# Patient Record
Sex: MALE | Race: BLACK OR AFRICAN AMERICAN | Employment: FULL TIME | URBAN - METROPOLITAN AREA
[De-identification: names, ages, dates, MRNs, and addresses within clinical notes are randomized per-mention and may not be internally consistent; named-entity substitution may affect disease eponyms.]

---

## 2017-01-24 RX ORDER — TERBINAFINE HYDROCHLORIDE 250 MG/1
TABLET ORAL
Qty: 90 TABLET | Refills: 0 | OUTPATIENT
Start: 2017-01-24

## 2017-01-24 NOTE — TELEPHONE ENCOUNTER
Received refill request for Terbinafine from [facility].  Order of 12-28-17 was #90. Called [facility], he was apparently dispensed #30 and refills are available. Attempted to call patient, his number was no longer in service.   Letter written telling patient that refi

## 2017-01-31 ENCOUNTER — TELEPHONE (OUTPATIENT)
Dept: INTERNAL MEDICINE CLINIC | Facility: CLINIC | Age: 52
End: 2017-01-31

## 2017-01-31 NOTE — TELEPHONE ENCOUNTER
Pt is seeing  on 2/3 for a follow up - cough and chest/rib pain and bruising.  He is asking if he needs to do a blood test.             Tasked to Nursing

## 2017-02-03 ENCOUNTER — HOSPITAL ENCOUNTER (OUTPATIENT)
Dept: GENERAL RADIOLOGY | Facility: HOSPITAL | Age: 52
Discharge: HOME | End: 2017-02-03
Attending: INTERNAL MEDICINE
Payer: COMMERCIAL

## 2017-02-03 ENCOUNTER — HOSPITAL ENCOUNTER (OUTPATIENT)
Dept: GENERAL RADIOLOGY | Age: 52
Discharge: HOME | End: 2017-02-03
Attending: INTERNAL MEDICINE
Payer: COMMERCIAL

## 2017-02-03 ENCOUNTER — TELEPHONE (OUTPATIENT)
Dept: INTERNAL MEDICINE CLINIC | Facility: CLINIC | Age: 52
End: 2017-02-03

## 2017-02-03 ENCOUNTER — OFFICE VISIT (OUTPATIENT)
Dept: INTERNAL MEDICINE CLINIC | Facility: CLINIC | Age: 52
End: 2017-02-03

## 2017-02-03 VITALS
WEIGHT: 241.19 LBS | BODY MASS INDEX: 37.85 KG/M2 | HEIGHT: 67 IN | OXYGEN SATURATION: 98 % | SYSTOLIC BLOOD PRESSURE: 118 MMHG | TEMPERATURE: 98 F | HEART RATE: 90 BPM | DIASTOLIC BLOOD PRESSURE: 80 MMHG

## 2017-02-03 DIAGNOSIS — E78.00 HYPERCHOLESTEREMIA: ICD-10-CM

## 2017-02-03 DIAGNOSIS — I10 ESSENTIAL HYPERTENSION WITH GOAL BLOOD PRESSURE LESS THAN 140/90: ICD-10-CM

## 2017-02-03 DIAGNOSIS — E11.9 TYPE 2 DIABETES MELLITUS WITHOUT COMPLICATION, UNSPECIFIED LONG TERM INSULIN USE STATUS: ICD-10-CM

## 2017-02-03 DIAGNOSIS — R07.81 RIB PAIN ON RIGHT SIDE: Primary | ICD-10-CM

## 2017-02-03 DIAGNOSIS — R07.81 RIB PAIN ON RIGHT SIDE: ICD-10-CM

## 2017-02-03 DIAGNOSIS — R10.9 FLANK PAIN: ICD-10-CM

## 2017-02-03 LAB
MULTISTIX LOT#: NORMAL NUMERIC
PH, URINE: 6 (ref 4.5–8)
SPECIFIC GRAVITY: 1.02 (ref 1–1.03)
URINE-COLOR: YELLOW
UROBILINOGEN,SEMI-QN: 0.2 MG/DL (ref 0–1.9)

## 2017-02-03 PROCEDURE — 99214 OFFICE O/P EST MOD 30 MIN: CPT | Performed by: INTERNAL MEDICINE

## 2017-02-03 PROCEDURE — 71100 X-RAY EXAM RIBS UNI 2 VIEWS: CPT

## 2017-02-03 PROCEDURE — 81002 URINALYSIS NONAUTO W/O SCOPE: CPT | Performed by: INTERNAL MEDICINE

## 2017-02-03 PROCEDURE — 99213 OFFICE O/P EST LOW 20 MIN: CPT | Performed by: INTERNAL MEDICINE

## 2017-02-03 RX ORDER — CYCLOBENZAPRINE HCL 10 MG
10 TABLET ORAL 3 TIMES DAILY PRN
Qty: 42 TABLET | Refills: 1 | Status: SHIPPED | OUTPATIENT
Start: 2017-02-03 | End: 2018-08-14

## 2017-02-03 RX ORDER — HYDROCODONE BITARTRATE AND ACETAMINOPHEN 5; 325 MG/1; MG/1
1 TABLET ORAL EVERY 6 HOURS PRN
Qty: 60 TABLET | Refills: 0 | Status: SHIPPED | OUTPATIENT
Start: 2017-02-03 | End: 2018-06-06

## 2017-02-03 RX ORDER — MELOXICAM 15 MG/1
15 TABLET ORAL DAILY
Qty: 30 TABLET | Refills: 1 | Status: SHIPPED | OUTPATIENT
Start: 2017-02-03 | End: 2018-02-01

## 2017-02-03 NOTE — PROGRESS NOTES
India Donnelly is a 54year old male. HPI:   Patient presents with:   Follow - Up: toenail fungus, DM II, Right side/ back pain x 3 weeks       53 y/o M who had recent bronchitis characterized shane moderate-to-severe cough; pt then developed +right flank enrique Tab [address] returned a non-compliant response. The tax information is out of balance.  Disp:  Rfl: 0       Allergies:  No Known Allergies              ROS:   Constitutional: no weight loss; no fatigue  Cardiovascular:  Negative orders of the defined types were placed in this encounter.        Meds This Visit:    No prescriptions requested or ordered in this encounter    Imaging & Referrals:  None     2/3/2017  Rashida Tirado MD

## 2017-05-16 ENCOUNTER — TELEPHONE (OUTPATIENT)
Dept: INTERNAL MEDICINE CLINIC | Facility: CLINIC | Age: 52
End: 2017-05-16

## 2017-05-16 RX ORDER — PROMETHAZINE HYDROCHLORIDE, PHENYLEPHRINE HYDROCHLORIDE AND CODEINE PHOSPHATE 6.25; 5; 1 MG/5ML; MG/5ML; MG/5ML
5 SOLUTION ORAL EVERY 6 HOURS PRN
Qty: 180 ML | Refills: 0 | Status: SHIPPED
Start: 2017-05-16 | End: 2017-05-30

## 2017-05-16 NOTE — TELEPHONE ENCOUNTER
To MD:  The above refill request is for a controlled substance. Please indicate yes or no to refill 30 days supply plus one refill.   If more refills are appropriate, please indicate quantity  Pending  RX for cough syrup -  To   Called patient and rela

## 2017-05-16 NOTE — TELEPHONE ENCOUNTER
Patient called back. Notified him script for cough syrup was faxed. Patient verbalized understanding.

## 2017-05-16 NOTE — TELEPHONE ENCOUNTER
Please advise for DR. CORONA patient - called patient who states he has a cold again with cough , rib pain from coughing , wants Norco and cough syrup, temp 99.0 , does not want to go to urgent care \" they are useless\" - to DR. FARIAS

## 2017-05-16 NOTE — TELEPHONE ENCOUNTER
[facility]  Called patient to notify him script was sent but no answer.  LMTCB  Copy of script sent to scanning

## 2017-05-16 NOTE — TELEPHONE ENCOUNTER
Please notify patient I am happy to help. I can give promethazine with codeine which can help his cough and rib pain.   I cannot give both promethazine with codeine and Norco.  If he would like I can give him Norco and then he can use over-the-counter DayQ

## 2017-05-16 NOTE — TELEPHONE ENCOUNTER
Prescription on cart to be faxed. I needed to alter amount prescribed since I am covering for Dr. Jennifer Flannery.

## 2017-05-16 NOTE — TELEPHONE ENCOUNTER
Pt. Is calling to get another Rx for cough syrup & norco for rib pain his cough & cold   Ph.  # [phone]     [phone]    Routed to clinical

## 2017-07-28 ENCOUNTER — LAB ENCOUNTER (OUTPATIENT)
Dept: LAB | Age: 52
End: 2017-07-28
Attending: INTERNAL MEDICINE
Payer: COMMERCIAL

## 2017-07-28 DIAGNOSIS — E11.9 DIABETES MELLITUS (HCC): Primary | ICD-10-CM

## 2017-07-28 LAB
ALBUMIN SERPL BCP-MCNC: 3.6 G/DL (ref 3.5–4.8)
ALBUMIN/GLOB SERPL: 1 {RATIO} (ref 1–2)
ALP SERPL-CCNC: 96 U/L (ref 32–100)
ALT SERPL-CCNC: 33 U/L (ref 17–63)
ANION GAP SERPL CALC-SCNC: 8 MMOL/L (ref 0–18)
AST SERPL-CCNC: 24 U/L (ref 15–41)
BILIRUB SERPL-MCNC: 0.8 MG/DL (ref 0.3–1.2)
BUN SERPL-MCNC: 13 MG/DL (ref 8–20)
BUN/CREAT SERPL: 14.8 (ref 10–20)
CALCIUM SERPL-MCNC: 9.1 MG/DL (ref 8.5–10.5)
CHLORIDE SERPL-SCNC: 103 MMOL/L (ref 95–110)
CHOLEST SERPL-MCNC: 227 MG/DL (ref 110–200)
CO2 SERPL-SCNC: 25 MMOL/L (ref 22–32)
CREAT SERPL-MCNC: 0.88 MG/DL (ref 0.5–1.5)
GLOBULIN PLAS-MCNC: 3.5 G/DL (ref 2.5–3.7)
GLUCOSE SERPL-MCNC: 209 MG/DL (ref 70–99)
HBA1C MFR BLD: 11.9 % (ref 4–6)
HDLC SERPL-MCNC: 34 MG/DL
LDLC SERPL CALC-MCNC: 166 MG/DL (ref 0–99)
NONHDLC SERPL-MCNC: 193 MG/DL
OSMOLALITY UR CALC.SUM OF ELEC: 288 MOSM/KG (ref 275–295)
POTASSIUM SERPL-SCNC: 4.4 MMOL/L (ref 3.3–5.1)
PROT SERPL-MCNC: 7.1 G/DL (ref 5.9–8.4)
SODIUM SERPL-SCNC: 136 MMOL/L (ref 136–144)
TRIGL SERPL-MCNC: 134 MG/DL (ref 1–149)

## 2017-07-28 PROCEDURE — 80053 COMPREHEN METABOLIC PANEL: CPT

## 2017-07-28 PROCEDURE — 80061 LIPID PANEL: CPT

## 2017-07-28 PROCEDURE — 36415 COLL VENOUS BLD VENIPUNCTURE: CPT

## 2017-07-28 PROCEDURE — 83036 HEMOGLOBIN GLYCOSYLATED A1C: CPT

## 2017-08-21 ENCOUNTER — HOSPITAL ENCOUNTER (OUTPATIENT)
Dept: GENERAL RADIOLOGY | Age: 52
Discharge: HOME | End: 2017-08-21
Attending: INTERNAL MEDICINE
Payer: COMMERCIAL

## 2017-08-21 ENCOUNTER — OFFICE VISIT (OUTPATIENT)
Dept: INTERNAL MEDICINE CLINIC | Facility: CLINIC | Age: 52
End: 2017-08-21

## 2017-08-21 VITALS
OXYGEN SATURATION: 98 % | TEMPERATURE: 98 F | WEIGHT: 248 LBS | HEART RATE: 118 BPM | BODY MASS INDEX: 38.92 KG/M2 | HEIGHT: 67 IN | DIASTOLIC BLOOD PRESSURE: 68 MMHG | SYSTOLIC BLOOD PRESSURE: 110 MMHG

## 2017-08-21 DIAGNOSIS — J40 BRONCHITIS: ICD-10-CM

## 2017-08-21 DIAGNOSIS — R50.9 FEVER, UNSPECIFIED FEVER CAUSE: Primary | ICD-10-CM

## 2017-08-21 DIAGNOSIS — E78.00 HYPERCHOLESTEREMIA: ICD-10-CM

## 2017-08-21 DIAGNOSIS — E11.9 TYPE 2 DIABETES MELLITUS WITHOUT COMPLICATION, UNSPECIFIED LONG TERM INSULIN USE STATUS: ICD-10-CM

## 2017-08-21 DIAGNOSIS — R50.9 FEVER, UNSPECIFIED FEVER CAUSE: ICD-10-CM

## 2017-08-21 PROCEDURE — 71020 XR CHEST PA + LAT CHEST (CPT=71020): CPT | Performed by: INTERNAL MEDICINE

## 2017-08-21 PROCEDURE — 99214 OFFICE O/P EST MOD 30 MIN: CPT | Performed by: INTERNAL MEDICINE

## 2017-08-21 PROCEDURE — 99212 OFFICE O/P EST SF 10 MIN: CPT | Performed by: INTERNAL MEDICINE

## 2017-08-21 RX ORDER — LEVOFLOXACIN 500 MG/1
500 TABLET, FILM COATED ORAL DAILY
Qty: 10 TABLET | Refills: 0 | Status: SHIPPED | OUTPATIENT
Start: 2017-08-21 | End: 2017-11-09

## 2017-08-21 RX ORDER — PROMETHAZINE HYDROCHLORIDE AND CODEINE PHOSPHATE 6.25; 1 MG/5ML; MG/5ML
5 SYRUP ORAL EVERY 6 HOURS PRN
Qty: 180 ML | Refills: 1 | Status: SHIPPED
Start: 2017-08-21 | End: 2017-11-16

## 2017-08-21 NOTE — PROGRESS NOTES
Ludy Carrera is a 54year old male.     HPI:   Patient presents with:  Cough: cough,rib and chest pain from cough per pt. and ringing in ears      55 y/o M with 3 d h/o +cough; +rib pain from coughing; +mild SOB; no CP; + sputum; fever to 104 seven days ago Rfl:    HYDROcodone-acetaminophen 5-325 MG Oral Tab WARNING - [employer] returned a non-compliant response. The tax information is out of balance. Disp:  Rfl: 0   Meloxicam 15 MG Oral Tab Take 1 tablet (15 mg total) by mouth daily.  Disp: 3 Yves     Onychomycosis  On terbinafine 250 mg po qD under care of podiatrist, Dr Kiersten Sun     PSA screening  Check PSA with next labs         cell [phone]              Orders This Visit:  No orders of the defined types were placed in this encounter.

## 2017-08-22 ENCOUNTER — TELEPHONE (OUTPATIENT)
Dept: INTERNAL MEDICINE CLINIC | Facility: CLINIC | Age: 52
End: 2017-08-22

## 2017-08-22 NOTE — TELEPHONE ENCOUNTER
[phone]  Pt was in to see Dr Lorie Armstrong yesterday and took off work yesterday and today. Pt would like a note excusing him from work 8/21-8/23 so the antibiotic can start to work.  pls call pt when ready to   To clinical

## 2017-08-23 NOTE — TELEPHONE ENCOUNTER
PT CALLING BACK AND THOUGHT DR. Octavio Knott WAS HERE ALREADY AND I TOLD PT HE IS NOT HERE UNTIL NOON    PT NEEDS A LETTER AND IS ASKING FOR A RETURN TO WORK NOTE TO GO BACK TOMORROW     PT IS COUGHING BUT DID NOT WISH TO GO TO WORK TODAY AND WAIT ONE MORE DAY

## 2017-11-09 ENCOUNTER — TELEPHONE (OUTPATIENT)
Dept: INTERNAL MEDICINE CLINIC | Facility: CLINIC | Age: 52
End: 2017-11-09

## 2017-11-09 RX ORDER — AZITHROMYCIN 250 MG/1
TABLET, FILM COATED ORAL
Qty: 6 TABLET | Refills: 0 | Status: SHIPPED | OUTPATIENT
Start: 2017-11-09 | End: 2017-11-16

## 2017-11-09 NOTE — TELEPHONE ENCOUNTER
To Dr. Jennifer Flannery - see below, sx onset: yesterday AM - denies fever/chills but does have a bit of achyness along ribs, lower back, neck.   [facility] [address] [facility]

## 2017-11-09 NOTE — TELEPHONE ENCOUNTER
Imp- URI; Rec- Zithromax (Z-nesha) as directed x5d; ERx sent to pharmacy as requested; please call pt

## 2017-11-09 NOTE — TELEPHONE ENCOUNTER
Please call pt, wife and son have been sick, both on ZPak, pt trying to avoid  Can pt receive prescription also starting with same symptoms, scratchy throat and cough, achy  Pt had flu shot  Please call to advise  Tasked to nursing

## 2017-11-16 ENCOUNTER — OFFICE VISIT (OUTPATIENT)
Dept: INTERNAL MEDICINE CLINIC | Facility: CLINIC | Age: 52
End: 2017-11-16

## 2017-11-16 ENCOUNTER — TELEPHONE (OUTPATIENT)
Dept: INTERNAL MEDICINE CLINIC | Facility: CLINIC | Age: 52
End: 2017-11-16

## 2017-11-16 VITALS
DIASTOLIC BLOOD PRESSURE: 86 MMHG | TEMPERATURE: 99 F | HEIGHT: 66 IN | BODY MASS INDEX: 38.86 KG/M2 | WEIGHT: 241.81 LBS | OXYGEN SATURATION: 98 % | SYSTOLIC BLOOD PRESSURE: 130 MMHG | HEART RATE: 81 BPM

## 2017-11-16 DIAGNOSIS — E11.9 TYPE 2 DIABETES MELLITUS WITHOUT COMPLICATION, UNSPECIFIED LONG TERM INSULIN USE STATUS: ICD-10-CM

## 2017-11-16 DIAGNOSIS — R07.81 RIB PAIN ON RIGHT SIDE: Primary | ICD-10-CM
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
## 2017-11-16 PROCEDURE — 99214 OFFICE O/P EST MOD 30 MIN: CPT | Performed by: INTERNAL MEDICINE

## 2017-11-16 PROCEDURE — 99212 OFFICE O/P EST SF 10 MIN: CPT | Performed by: INTERNAL MEDICINE

## 2017-11-16 RX ORDER — TRAMADOL HYDROCHLORIDE 50 MG/1
TABLET ORAL
Refills: 2 | COMMUNITY
Start: 2017-11-03 | End: 2018-06-06

## 2017-11-16 RX ORDER — LANCETS
1 EACH MISCELLANEOUS
COMMUNITY
Start: 2011-11-22

## 2017-11-16 NOTE — TELEPHONE ENCOUNTER
Pt. Is calling to get a new note from Dr. Bj Thakkar that states he has no restrictions  Ph. # [phone]    Fax.  # [phone] Attn: Soledad Cuevas in [facility] dining    Routed to [facility]

## 2017-11-16 NOTE — PROGRESS NOTES
Silviano Gustafson is a 54year old male. HPI:   Patient presents with: Follow - Up: Patient presents for back pain due to a fall from a ladder. Denies numbness or tingaling. Last office visit 8/21/2017 w/ Dr. Alexsandra Braga. As per nursing documentation.   Pa tablet by mouth 2 (two) times daily. Disp:  Rfl: 1   Liraglutide (VICTOZA) 18 MG/3ML Subcutaneous Solution Pen-injector Inject 0.2 mL into the skin daily.  Disp:  Rfl:    TraMADol HCl 50 MG Oral Tab TAKE 1 TO 2 TABLETS BY MOUTH EVERY 6 HOURS AS NEEEDED FOR ecchymosis. there does not seem to be one specific area of pain only in this area right lateral thoracic area. ASSESSMENT AND PLAN:     1. Rib pain on right side  Right rib pain after fall. Patient does not look to be in any acute distress.   We will

## 2018-02-01 ENCOUNTER — TELEPHONE (OUTPATIENT)
Dept: INTERNAL MEDICINE CLINIC | Facility: CLINIC | Age: 53
End: 2018-02-01

## 2018-02-01 RX ORDER — MELOXICAM 15 MG/1
TABLET ORAL
Qty: 30 TABLET | Refills: 5 | Status: SHIPPED | OUTPATIENT
Start: 2018-02-01 | End: 2018-07-20

## 2018-02-01 NOTE — TELEPHONE ENCOUNTER
Meloxicam last filled 2/3/17 #30/1.  Per Dr. Cat Jeferson office visit on 2/3/17:  ASSESSMENT/PLAN:   Right flank pain  Exam suggest muscle and rib injury; check UA dipstick neg hematuria; check XR ribs right; trial meloxicam 15 mg po qD, Flexeril 10 mg po q8hrs prn,

## 2018-02-19 ENCOUNTER — TELEPHONE (OUTPATIENT)
Dept: INTERNAL MEDICINE CLINIC | Facility: CLINIC | Age: 53
End: 2018-02-19

## 2018-02-19 RX ORDER — LEVOFLOXACIN 500 MG/1
500 TABLET, FILM COATED ORAL DAILY
Qty: 10 TABLET | Refills: 0 | Status: SHIPPED | OUTPATIENT
Start: 2018-02-19 | End: 2018-03-01

## 2018-02-19 NOTE — TELEPHONE ENCOUNTER
Pt has a bad cough/causing him rib & back pain  Requests refill of cough medicine Dr Femi Quiroga had prescribed  And something to help with pain    [facility] [place]     Please call pt to advise/confirm [phone]

## 2018-02-19 NOTE — TELEPHONE ENCOUNTER
Called patient to get more information. He reports this is his 5th day of coughing. He has soreness to his chest when he coughs or swallows. He is coughing up greenish yellow phlegm. He did have a temp of 101 2 days ago but has not checked his temp since.

## 2018-02-20 NOTE — TELEPHONE ENCOUNTER
To Dr Roxie Augustin to review high interaction    Drug-Drug: GlyBURIDE-MetFORMIN and levofloxacin   The hypoglycemic effect of Sulfonylureas may be increased by Quinolones especially in elderly patients with renal compromise.  Hypoglycemia symptoms including lightheade

## 2018-02-20 NOTE — TELEPHONE ENCOUNTER
Tan MCNEILL called and spoke to patient and notified him that rx was sent.   I called patient back again and relayed Dr Shikha Jeronimo message and instructions to him below about possible interaction, to check sugar BID and hold glyburide metformin if sugar less

## 2018-04-05 ENCOUNTER — HOSPITAL ENCOUNTER (OUTPATIENT)
Dept: GENERAL RADIOLOGY | Age: 53
Discharge: HOME | End: 2018-04-05
Attending: INTERNAL MEDICINE
Payer: COMMERCIAL

## 2018-04-05 ENCOUNTER — OFFICE VISIT (OUTPATIENT)
Dept: INTERNAL MEDICINE CLINIC | Facility: CLINIC | Age: 53
End: 2018-04-05

## 2018-04-05 VITALS
WEIGHT: 252 LBS | OXYGEN SATURATION: 95 % | SYSTOLIC BLOOD PRESSURE: 106 MMHG | HEIGHT: 67 IN | TEMPERATURE: 99 F | BODY MASS INDEX: 39.55 KG/M2 | DIASTOLIC BLOOD PRESSURE: 78 MMHG | HEART RATE: 90 BPM

## 2018-04-05 DIAGNOSIS — Z12.5 SCREENING PSA (PROSTATE SPECIFIC ANTIGEN): ICD-10-CM

## 2018-04-05 DIAGNOSIS — M54.31 SCIATICA OF RIGHT SIDE: ICD-10-CM

## 2018-04-05 DIAGNOSIS — G47.33 OSA (OBSTRUCTIVE SLEEP APNEA): ICD-10-CM

## 2018-04-05 DIAGNOSIS — Z00.00 PHYSICAL EXAM, ANNUAL: Primary | ICD-10-CM

## 2018-04-05 DIAGNOSIS — E78.00 HYPERCHOLESTEREMIA: ICD-10-CM

## 2018-04-05 DIAGNOSIS — G56.03 BILATERAL CARPAL TUNNEL SYNDROME: ICD-10-CM

## 2018-04-05 DIAGNOSIS — I10 ESSENTIAL HYPERTENSION WITH GOAL BLOOD PRESSURE LESS THAN 140/90: ICD-10-CM

## 2018-04-05 DIAGNOSIS — E11.9 TYPE 2 DIABETES MELLITUS WITHOUT COMPLICATION, UNSPECIFIED WHETHER LONG TERM INSULIN USE (HCC): ICD-10-CM

## 2018-04-05 PROCEDURE — 99396 PREV VISIT EST AGE 40-64: CPT | Performed by: INTERNAL MEDICINE

## 2018-04-05 PROCEDURE — 72110 X-RAY EXAM L-2 SPINE 4/>VWS: CPT | Performed by: INTERNAL MEDICINE

## 2018-04-05 RX ORDER — TRAMADOL HYDROCHLORIDE 50 MG/1
50 TABLET ORAL EVERY 8 HOURS PRN
Qty: 60 TABLET | Refills: 2 | Status: SHIPPED | OUTPATIENT
Start: 2018-04-05 | End: 2018-06-11

## 2018-04-05 RX ORDER — PEN NEEDLE, DIABETIC 31 GX5/16"
NEEDLE, DISPOSABLE MISCELLANEOUS
Refills: 0 | COMMUNITY
Start: 2018-03-23

## 2018-04-05 RX ORDER — LOSARTAN POTASSIUM 50 MG/1
50 TABLET ORAL
Refills: 1 | COMMUNITY
Start: 2018-03-29

## 2018-04-05 RX ORDER — GABAPENTIN 300 MG/1
CAPSULE ORAL
Qty: 90 CAPSULE | Refills: 3 | Status: SHIPPED | OUTPATIENT
Start: 2018-04-05 | End: 2018-09-10

## 2018-04-05 NOTE — PROGRESS NOTES
Alee Vallecillo is a 50year old male. HPI:   Patient presents with:  Physical: Annual Physical Visit  Checkup: Sleep Apnea - pt's wife reports hearing patient choking at night and pt feeling too tired in the morning from lack of sleep.  Only gets about 5ho MULTICLIX LANCETS Does not apply Misc 1 each by Other route.  Disp:  Rfl:    TraMADol HCl 50 MG Oral Tab TAKE 1 TO 2 TABLETS BY MOUTH EVERY 6 HOURS AS NEEEDED FOR PAIN Disp:  Rfl: 2   Terbinafine HCl 250 MG Oral Tab Take 1 tablet (250 mg total) by mouth lizet kg), SpO2 95 %.   Constitutional: alert and oriented x3 in no acute distress  HEENT- EOMI, PERRL  Nose/Mouth/Throat: pharynx without erythema; no oral lesions  Neck/Thyroid: neck supple; no thyromegaly  Lymphatics: no lymphadenopathy of neck or groin  Cardi Imaging & Referrals:   [facility] ALT REFERRAL DIAGOSTIC SLEEP STUDY ADULT     4/5/2018  Keo Santos MD

## 2018-04-09 ENCOUNTER — TELEPHONE (OUTPATIENT)
Dept: INTERNAL MEDICINE CLINIC | Facility: CLINIC | Age: 53
End: 2018-04-09

## 2018-04-09 RX ORDER — METHYLPREDNISOLONE 4 MG/1
TABLET ORAL
Qty: 21 TABLET | Refills: 0 | Status: SHIPPED | OUTPATIENT
Start: 2018-04-09 | End: 2018-06-06

## 2018-04-09 NOTE — TELEPHONE ENCOUNTER
Gabapentin and Tramadol not helping with carpal tunnel or the sciatica. Getting up to move around during the night because the pain is so intense. Can you prescribe something for the pain?   Not sleeping only has had about 8 hours of sleep for the whole we

## 2018-04-09 NOTE — TELEPHONE ENCOUNTER
To Dr. Jennie Aranda - see below - pt states pain 9/10 R lower back pain.   I was not able to locate 2/14 EMG results per your 4/5/18 note

## 2018-04-10 ENCOUNTER — LAB ENCOUNTER (OUTPATIENT)
Dept: LAB | Age: 53
End: 2018-04-10
Attending: INTERNAL MEDICINE
Payer: COMMERCIAL

## 2018-04-10 ENCOUNTER — TELEPHONE (OUTPATIENT)
Dept: INTERNAL MEDICINE CLINIC | Facility: CLINIC | Age: 53
End: 2018-04-10

## 2018-04-10 DIAGNOSIS — E11.9 TYPE 2 DIABETES MELLITUS WITHOUT COMPLICATION, UNSPECIFIED WHETHER LONG TERM INSULIN USE (HCC): ICD-10-CM

## 2018-04-10 DIAGNOSIS — E78.00 HYPERCHOLESTEREMIA: ICD-10-CM

## 2018-04-10 DIAGNOSIS — Z12.5 SCREENING PSA (PROSTATE SPECIFIC ANTIGEN): ICD-10-CM

## 2018-04-10 DIAGNOSIS — I10 ESSENTIAL HYPERTENSION WITH GOAL BLOOD PRESSURE LESS THAN 140/90: ICD-10-CM

## 2018-04-10 DIAGNOSIS — Z00.00 PHYSICAL EXAM, ANNUAL: ICD-10-CM

## 2018-04-10 PROCEDURE — 80050 GENERAL HEALTH PANEL: CPT

## 2018-04-10 PROCEDURE — 36415 COLL VENOUS BLD VENIPUNCTURE: CPT

## 2018-04-10 PROCEDURE — 83036 HEMOGLOBIN GLYCOSYLATED A1C: CPT

## 2018-04-10 PROCEDURE — 80053 COMPREHEN METABOLIC PANEL: CPT

## 2018-04-10 PROCEDURE — 80061 LIPID PANEL: CPT

## 2018-04-10 PROCEDURE — 85025 COMPLETE CBC W/AUTO DIFF WBC: CPT

## 2018-04-10 PROCEDURE — 81001 URINALYSIS AUTO W/SCOPE: CPT

## 2018-04-10 NOTE — TELEPHONE ENCOUNTER
Imp- Sciatica RLE  Flare of pain x one month; takes Aleve 220 mg two tabs po qD without benefit; trial gabapentin 300 mg po TID;  XR L-spine on 4/5 shows anterior wedging of T11, T12 and L1 more likely chronic.  Mild-to-moderate spondylosis ; order given fo

## 2018-04-10 NOTE — TELEPHONE ENCOUNTER
To Dr. Jose Baez to review patient's abnormal diabetic labs. Okay to wait for Dr. Lazarus Heys?  Per last OV note:     \"Diabetes mellitus Type II   on Victoza 1.2 mg SQ qD, and glyburide/metformin 2.5/500 one tab po BID; Rx per Dr Saleem Paulson"    Should patient be notified to

## 2018-04-12 ENCOUNTER — TELEPHONE (OUTPATIENT)
Dept: INTERNAL MEDICINE CLINIC | Facility: CLINIC | Age: 53
End: 2018-04-12

## 2018-04-12 NOTE — TELEPHONE ENCOUNTER
Labs 4/12; A1C 12.7% on Victoza 1.2 mg SQ qD, and glyburide/metformin 2.5/500 one tab po BID;  on Lipitor 20 mg po qHS;  Rx per Dr Melissa Franz; pt called

## 2018-04-18 NOTE — H&P
[address] [facility] [address] Gastroenterology                                                                                                  Clinic History and Physical     Pa endoscopies:  Denies    Social Hx:  - No smoking/etoh  - Denies illicit drug use   - Occupation: Between jobs  - Lives with spouse  - NSAIDs/ASA use: Meloxicam 15 mg as needed      History, Medications, Allergies, ROS:      Past Medical History:   Diagnosi Rfl: 0   TraMADol HCl 50 MG Oral Tab Take 1 tablet (50 mg total) by mouth every 8 (eight) hours as needed for Pain.  Disp: 60 tablet Rfl: 2   MELOXICAM 15 MG Oral Tab TAKE 1 TABLET BY MOUTH EVERY DAY Disp: 30 tablet Rfl: 5   ACCU-CHEK MULTICLIX LANCETS Does (114.8 kg).     Gen: patient appears comfortable and in no acute distress  HEENT: conjunctiva pink, the sclera appears anicteric, oropharynx clear, mucus membranes appear moist  CV: regular rate and rhythm, the extremities are warm and well perfused   Lung: needed. 3. Hypertension: Systolic BP mildly elevated in office. Patient is otherwise asymptomatic. Advised to follow-up with PCP. 4.  Chest tightness: Several month history of periodic chest tightness possibly exacerbated by position.   Does not see APN  4/18/2018

## 2018-04-19 ENCOUNTER — TELEPHONE (OUTPATIENT)
Dept: GASTROENTEROLOGY | Facility: CLINIC | Age: 53
End: 2018-04-19

## 2018-04-19 ENCOUNTER — OFFICE VISIT (OUTPATIENT)
Dept: GASTROENTEROLOGY | Facility: CLINIC | Age: 53
End: 2018-04-19

## 2018-04-19 VITALS
WEIGHT: 253 LBS | DIASTOLIC BLOOD PRESSURE: 87 MMHG | HEIGHT: 67 IN | SYSTOLIC BLOOD PRESSURE: 135 MMHG | HEART RATE: 117 BPM | BODY MASS INDEX: 39.71 KG/M2

## 2018-04-19 DIAGNOSIS — R07.89 FEELING OF CHEST TIGHTNESS: ICD-10-CM

## 2018-04-19 DIAGNOSIS — R10.13 DYSPEPSIA: ICD-10-CM

## 2018-04-19 DIAGNOSIS — K62.5 RECTAL BLEEDING: ICD-10-CM

## 2018-04-19 DIAGNOSIS — Z12.11 SCREENING FOR COLON CANCER: Primary | ICD-10-CM

## 2018-04-19 PROCEDURE — 99212 OFFICE O/P EST SF 10 MIN: CPT | Performed by: NURSE PRACTITIONER

## 2018-04-19 PROCEDURE — 99203 OFFICE O/P NEW LOW 30 MIN: CPT | Performed by: NURSE PRACTITIONER

## 2018-04-19 NOTE — TELEPHONE ENCOUNTER
Scheduled for:  Colonoscopy - 13863  Provider Name:  Dr. Rupa Helton  Date:  5/11/18  Location:  [facility]  Sedation:  MAC  Time:  (pt is aware that [facility] will call with arrival time)  Prep:  Colyte, Prep instructions were given to pt in the office, pt verbalized [place]

## 2018-04-19 NOTE — PATIENT INSTRUCTIONS
1. Schedule colonoscopy with Dr. Tim Fink w/ MAC    2.  bowel prep from pharmacy - split dose Colyte     3.  Continue all medications for procedure except DM Rx (Will contact prescribing MD (Dr. Aristides Foster) for specific recommendations including

## 2018-04-20 ENCOUNTER — OFFICE VISIT (OUTPATIENT)
Dept: SLEEP CENTER | Age: 53
End: 2018-04-20
Attending: INTERNAL MEDICINE
Payer: COMMERCIAL

## 2018-04-20 DIAGNOSIS — Z76.89 SLEEP CONCERN: Primary | ICD-10-CM

## 2018-04-20 PROCEDURE — 95811 POLYSOM 6/>YRS CPAP 4/> PARM: CPT

## 2018-04-20 PROCEDURE — 95810 POLYSOM 6/> YRS 4/> PARAM: CPT

## 2018-04-20 NOTE — TELEPHONE ENCOUNTER
Request for DM orders faxed to dr. Marylee Collie office at [phone]. Fax confirmation received 4/20/18 @ [phone].    -Awaiting orders at this time.

## 2018-04-24 NOTE — PROCEDURES
[address] [facility]  Accredited by the [employer] of [employer] Medicine (AASM)    PATIENT'S NAME: Griselda Brown   ATTENDING PHYSICIAN: Chapincito Navarrete. Macy Ramirez MD   REFERRING PHYSICIAN: Chapincito Navarrete.  Macy Ramirez MD   PATIENT ACCOUNT #: [id number] LOCATION: Patient should be warned of danger of driving or performing high-risk activity in a sleepy state. 4.   Nocturnal polysomnography study with titration of nasal CPAP is recommended. Dictated By Olean Lennox Druscilla Plume, MD  d: 04/24/2018 11:35:10  t: 04/24/2018 11:46

## 2018-04-26 ENCOUNTER — TELEPHONE (OUTPATIENT)
Dept: INTERNAL MEDICINE CLINIC | Facility: CLINIC | Age: 53
End: 2018-04-26

## 2018-04-26 DIAGNOSIS — G47.33 OSA (OBSTRUCTIVE SLEEP APNEA): Primary | ICD-10-CM

## 2018-05-08 NOTE — TELEPHONE ENCOUNTER
I spoke to SHAHEED Holguin at Dr Brayan Sinha office. They did receive the fax requesting diabetic medications orders prior to pt's Colonoscopy 05/11/18. She will remind Dr Bridgette Riddle we are awaiting a response.

## 2018-05-10 NOTE — TELEPHONE ENCOUNTER
Spoke to pt and reviewed that we are still awaiting orders from Dr. Louise Avelar office at this time for his DM meds.  I encouraged him to call them asap to obtain those orders and to c/b if he is able to obtain them.    -Awaiting c/b from both Dr. Jordan Ham

## 2018-05-10 NOTE — TELEPHONE ENCOUNTER
Call transferred to RN:    Pt states Dr. Osmin Rosado called him directly and gave him all his DM med orders. He was told to hold all his DM meds this evening and to resume them after the procedure tomorrow.  He will be checking his BG hourly and take a 4oz can of

## 2018-05-10 NOTE — TELEPHONE ENCOUNTER
Pt returning rn call. Attempt to transfer twice with no answer.  Please call pt at   [phone] thank you

## 2018-05-10 NOTE — TELEPHONE ENCOUNTER
Was on hold for 10 mins and call was disconnected, called again and was placed on hold despite telling  this was urgent as pt's procedure is tomorrow. Spoke w/ Sharda HUMMEL and reviewed that we need orders STAT.  She states the MD has not addres

## 2018-05-10 NOTE — TELEPHONE ENCOUNTER
I called Dr. Nallely Roper office at [phone] and was told by the answering service that they do not open today until 10AM.

## 2018-05-10 NOTE — TELEPHONE ENCOUNTER
Pt states that he called Dr. Louise Avelar office and told them he will be calling them every 30 mins until they c/b w/ orders. He asked me to c/b if they do not call by 2PM and he will f/u with them again.    -Awaiting c/b from Dr. Louise Avelar office at this time.

## 2018-05-11 ENCOUNTER — LAB REQUISITION (OUTPATIENT)
Dept: LAB | Facility: HOSPITAL | Age: 53
End: 2018-05-11
Payer: COMMERCIAL

## 2018-05-11 DIAGNOSIS — Z12.11 ENCOUNTER FOR SCREENING FOR MALIGNANT NEOPLASM OF COLON: ICD-10-CM

## 2018-05-11 PROCEDURE — 88305 TISSUE EXAM BY PATHOLOGIST: CPT | Performed by: INTERNAL MEDICINE

## 2018-05-11 PROCEDURE — 88341 IMHCHEM/IMCYTCHM EA ADD ANTB: CPT | Performed by: INTERNAL MEDICINE

## 2018-05-11 PROCEDURE — 88342 IMHCHEM/IMCYTCHM 1ST ANTB: CPT | Performed by: INTERNAL MEDICINE

## 2018-05-16 ENCOUNTER — TELEPHONE (OUTPATIENT)
Dept: INTERNAL MEDICINE CLINIC | Facility: CLINIC | Age: 53
End: 2018-05-16

## 2018-05-16 DIAGNOSIS — M54.16 LUMBAR RADICULOPATHY: Primary | ICD-10-CM

## 2018-05-16 NOTE — TELEPHONE ENCOUNTER
Pt have in apt for tomorrow with  pt only need in MRI order since physical therapy is not working he is asking if he really need to come in just for that.  Please advise

## 2018-05-17 NOTE — TELEPHONE ENCOUNTER
Pt is returning Dr Igor Rodriguez call regarding MRI results,  pt is also asking if he can get a temp parking placard to park close at his work  Please call [phone]     Tasked to nursing

## 2018-05-17 NOTE — TELEPHONE ENCOUNTER
Imp- sciatica RLE; s/p physical therapy x 4 weeks; working limited hours at work; pain results;  Rec- MRI lumbar spine; pt called

## 2018-05-18 ENCOUNTER — OFFICE VISIT (OUTPATIENT)
Dept: SLEEP CENTER | Age: 53
End: 2018-05-18
Attending: INTERNAL MEDICINE
Payer: COMMERCIAL

## 2018-05-18 DIAGNOSIS — Z76.89 SLEEP CONCERN: Primary | ICD-10-CM

## 2018-05-18 PROCEDURE — 95811 POLYSOM 6/>YRS CPAP 4/> PARM: CPT

## 2018-05-22 ENCOUNTER — HOSPITAL ENCOUNTER (OUTPATIENT)
Dept: MRI IMAGING | Age: 53
Discharge: HOME | End: 2018-05-22
Attending: INTERNAL MEDICINE
Payer: COMMERCIAL

## 2018-05-22 DIAGNOSIS — M54.16 LUMBAR RADICULOPATHY: ICD-10-CM

## 2018-05-22 PROCEDURE — 72148 MRI LUMBAR SPINE W/O DYE: CPT | Performed by: INTERNAL MEDICINE

## 2018-05-24 ENCOUNTER — TELEPHONE (OUTPATIENT)
Dept: INTERNAL MEDICINE CLINIC | Facility: CLINIC | Age: 53
End: 2018-05-24

## 2018-05-24 DIAGNOSIS — G47.33 OSA (OBSTRUCTIVE SLEEP APNEA): ICD-10-CM

## 2018-05-24 DIAGNOSIS — M54.16 LUMBAR RADICULOPATHY: Primary | ICD-10-CM

## 2018-05-24 NOTE — PROCEDURES
[address] [facility]  Accredited by the [employer] of Sleep Medicine (AASM)    PATIENT'S NAME: Flores Parikh   ATTENDING PHYSICIAN: Haylie Trejo. Alfonzo Closs, MD   REFERRING PHYSICIAN: Haylie Trejo.  Alfonzo Closs, MD   PATIENT ACCOUNT #: [id number] LOCATION:

## 2018-05-24 NOTE — TELEPHONE ENCOUNTER
Imp- sciatica RLE; s/p physical therapy x 4 weeks; working limited hours at work; pain persists;  MRI lumbar spine 5/22/18 shows moderate bilateral foraminal stenosis at L5-S1; pt advised to see Pain Center at [phone] to discuss option for LESI; pt ca

## 2018-05-25 ENCOUNTER — PATIENT MESSAGE (OUTPATIENT)
Dept: INTERNAL MEDICINE CLINIC | Facility: CLINIC | Age: 53
End: 2018-05-25

## 2018-05-25 RX ORDER — HYDROCODONE BITARTRATE AND ACETAMINOPHEN 5; 325 MG/1; MG/1
1 TABLET ORAL EVERY 6 HOURS PRN
Qty: 40 TABLET | Refills: 0 | Status: SHIPPED | OUTPATIENT
Start: 2018-05-25 | End: 2018-07-17

## 2018-05-25 NOTE — TELEPHONE ENCOUNTER
Lawanda Corrigan is calling to request an order for the pain clinic they weren't allowed to make an appt. Without an order they would like to call today to set up an appt they close at 2pm today  Ph.  # [phone]  Routed to clinical

## 2018-05-25 NOTE — TELEPHONE ENCOUNTER
Routed to Dr. Valentina Minaya. Referral pended below. Pt would like to schedule this appointment today.

## 2018-05-25 NOTE — TELEPHONE ENCOUNTER
Imp - lumbar radiculopathy; on tramadol 50 mg - 100 mg po TID prn; earliest pain clinic appt is 6/11/18; still with severe pain; will give Norco 5 mg po q6hrs prn, #40, no RF;  Rx left at window for pick-up; pt called    ZAIDA titrated to nasal CPAP 10 CWP; o

## 2018-05-25 NOTE — TELEPHONE ENCOUNTER
Spoke with patient. He did receive message and has called pain clinic. Unable to get appt until June 11. Wanted to let Dr. Robert Toledo know that he will be in pain until that time.  Any further recommendation, Dr. Robert Toledo?

## 2018-05-25 NOTE — TELEPHONE ENCOUNTER
See TT 5/24. Referral generated and faxed to Pain Clinic. Pt notified. To call clinic to see if he can schedule appointment.

## 2018-05-25 NOTE — TELEPHONE ENCOUNTER
Faxed referral to Pain Clinic. Attempted to call them to see is this is enough for them to schedule appt. (Voicemail.) Called pt and told him what we did. Asked him to call the clinic and see if he can schedule appointment.

## 2018-05-31 NOTE — TELEPHONE ENCOUNTER
Await pain clinic evaluation on 6/11/18 and response to planned epidural steroid injection before decision made on temporary parking placard; please call pt

## 2018-05-31 NOTE — TELEPHONE ENCOUNTER
Pt is calling he forgot to ask Dr Lorie Armstrong if he will sign a temporary parking placard?   Pt said it is difficult when he has to park a distance away from work and also while he is doing errands it is difficult not parking close  Please call pt [phone]   Task

## 2018-06-06 ENCOUNTER — OFFICE VISIT (OUTPATIENT)
Dept: PAIN CLINIC | Facility: HOSPITAL | Age: 53
End: 2018-06-06
Attending: INTERNAL MEDICINE
Payer: COMMERCIAL

## 2018-06-06 ENCOUNTER — DOCUMENTATION ONLY (OUTPATIENT)
Dept: PAIN CLINIC | Facility: HOSPITAL | Age: 53
End: 2018-06-06

## 2018-06-06 VITALS
BODY MASS INDEX: 39.71 KG/M2 | RESPIRATION RATE: 18 BRPM | SYSTOLIC BLOOD PRESSURE: 154 MMHG | HEART RATE: 101 BPM | HEIGHT: 67 IN | WEIGHT: 253 LBS | DIASTOLIC BLOOD PRESSURE: 90 MMHG

## 2018-06-06 DIAGNOSIS — M47.817 FACET ARTHRITIS OF LUMBOSACRAL REGION: ICD-10-CM

## 2018-06-06 DIAGNOSIS — M54.10 RADICULAR PAIN OF RIGHT LOWER EXTREMITY: ICD-10-CM

## 2018-06-06 DIAGNOSIS — M51.37 DDD (DEGENERATIVE DISC DISEASE), LUMBOSACRAL: Primary | ICD-10-CM

## 2018-06-06 DIAGNOSIS — M48.062 SPINAL STENOSIS OF LUMBAR REGION WITH NEUROGENIC CLAUDICATION: ICD-10-CM

## 2018-06-06 PROCEDURE — 99201 HC OUTPT EVAL AND MGNT NEW PT LEVEL 1: CPT

## 2018-06-06 NOTE — PROGRESS NOTES
06/06/18  PRESENTS AMBULATORY TO CPM;  NEW CONSULT C/O RLBP RADIATING BRO THE RLE;  RATES HIS PAIN 9/10;  PT REPORTS BACK PAIN SINCE 1992;  \"GOTTEN WORSE OVER THE YEARS;  HAD 4WKS OF P.T. WHICH DID NOT HELP ;  PT WORKS AS A -IS ON HIS FEET MOST OF THE

## 2018-06-06 NOTE — CHRONIC PAIN
[facility]  Pain Consultation     HISTORY OF PRESENT ILLNESS:  Laveta Barthel is a 50year old old male referred to the pain clinic by Dr. Jennie Aranda for DDD (degenerative disc disease), lumbosacral  (primary encounter diagnosis)  Facet arthrit Pain. Disp: 60 tablet Rfl: 2   MELOXICAM 15 MG Oral Tab TAKE 1 TABLET BY MOUTH EVERY DAY Disp: 30 tablet Rfl: 5   ACCU-CHEK MULTICLIX LANCETS Does not apply Misc 1 each by Other route.  Disp:  Rfl:    Cyclobenzaprine HCl 10 MG Oral Tab Take 1 tablet (10 mg 2011    repair -  in [facility]   • Hypercholesterolemia    • Hyperlipidemia    • Hypogonadism in male     testosterone level low, no supplement recommended   • Oligospermia    • Screening PSA (prostate specific antigen) 10-       FAMILY HISTORY:  F 30 No     KNEES    Degree Pain   Right Flexion 120 No   Right Extension 0 No   Left Flexion 120 No   Left Extension 0 No     MOTOR EXAMINATION:  LOWER EXTREMITY      LEFT RIGHT   Iliopsoas 5/5 5/5   Quadriceps 5/5 5/5   Foot DF 5/5 5/5   Foot EHL 5/5 5/5 disc/facet abnormality, spinal stenosis, or foraminal stenosis. L3-L4:   No significant disc/facet abnormality, spinal stenosis, or foraminal stenosis.     L4-L5:   Disk desiccation with bulging disk, facet, and ligamentous hypertrophy results in mild ce with fluoroscopic guidance at L4-5 and L5-S1  I have informed Noris Sena  of the risks of neuraxial anesthesia including, but not limited to: failure, headache, backache, spinal, unilateral/patchy block, difficulty breathing, infection, bleeding, nerve d

## 2018-06-11 ENCOUNTER — TELEPHONE (OUTPATIENT)
Dept: PAIN CLINIC | Facility: HOSPITAL | Age: 53
End: 2018-06-11

## 2018-06-11 RX ORDER — TRAMADOL HYDROCHLORIDE 50 MG/1
TABLET ORAL
Qty: 60 TABLET | Refills: 2 | Status: SHIPPED
Start: 2018-06-11 | End: 2018-07-20

## 2018-06-11 NOTE — TELEPHONE ENCOUNTER
To MD:  The above refill request is for a controlled substance. Please indicate yes or no to refill 30 days supply plus one refill.   If more refills are appropriate, please indicate quantity    Last refilled 4/5/2018 # 60/2

## 2018-06-22 ENCOUNTER — TELEPHONE (OUTPATIENT)
Dept: PAIN CLINIC | Facility: HOSPITAL | Age: 53
End: 2018-06-22

## 2018-06-22 NOTE — TELEPHONE ENCOUNTER
Patient states that he is experiencing severe pain after injection, from right glute that radiates to leg.  He can be reached at [phone]

## 2018-06-25 ENCOUNTER — TELEPHONE (OUTPATIENT)
Dept: INTERNAL MEDICINE CLINIC | Facility: CLINIC | Age: 53
End: 2018-06-25

## 2018-06-25 RX ORDER — HYDROCODONE BITARTRATE AND ACETAMINOPHEN 5; 325 MG/1; MG/1
1 TABLET ORAL EVERY 6 HOURS PRN
Qty: 40 TABLET | Refills: 0 | Status: CANCELLED | OUTPATIENT
Start: 2018-06-25

## 2018-06-25 NOTE — TELEPHONE ENCOUNTER
Now that pt has established care with pain clinic, advise pt contact Pain clinic Re: need for narcotic pain meds; please notify pt

## 2018-06-25 NOTE — TELEPHONE ENCOUNTER
Pt. Is requesting a refill on: Hydrocodone pt. Would like to  Rx today Pt. States he had spinal injections on 06/11 and his back pain came back two days later. Pt. is still having severe back pain his next appt.  Is on 06/28 to see if he needs more p

## 2018-06-25 NOTE — TELEPHONE ENCOUNTER
To MD:  The above refill request is for a controlled substance. Please indicate yes or no to refill 30 days supply plus one refill.   If more refills are appropriate, please indicate quantity    Last refilled - 5/25/2018 # 40 /0

## 2018-06-25 NOTE — TELEPHONE ENCOUNTER
Pt called back to check status on request for Norco   - he is in pain - requests call back to advise [phone]

## 2018-06-27 ENCOUNTER — OFFICE VISIT (OUTPATIENT)
Dept: PAIN CLINIC | Facility: HOSPITAL | Age: 53
End: 2018-06-27
Attending: ANESTHESIOLOGY
Payer: COMMERCIAL

## 2018-06-27 VITALS — SYSTOLIC BLOOD PRESSURE: 174 MMHG | DIASTOLIC BLOOD PRESSURE: 92 MMHG | HEART RATE: 105 BPM

## 2018-06-27 DIAGNOSIS — G56.03 BILATERAL CARPAL TUNNEL SYNDROME: ICD-10-CM

## 2018-06-27 DIAGNOSIS — M54.16 RIGHT LUMBAR RADICULOPATHY: Primary | Chronic | ICD-10-CM

## 2018-06-27 PROBLEM — M51.36 DEGENERATIVE DISC DISEASE, LUMBAR: Chronic | Status: ACTIVE | Noted: 2018-06-27

## 2018-06-27 PROBLEM — M51.369 DEGENERATIVE DISC DISEASE, LUMBAR: Chronic | Status: ACTIVE | Noted: 2018-06-27

## 2018-06-27 PROCEDURE — 99211 OFF/OP EST MAY X REQ PHY/QHP: CPT

## 2018-06-27 PROCEDURE — 99211 OFF/OP EST MAY X REQ PHY/QHP: CPT | Performed by: NURSE PRACTITIONER

## 2018-06-27 RX ORDER — HYDROCODONE BITARTRATE AND ACETAMINOPHEN 10; 325 MG/1; MG/1
1 TABLET ORAL EVERY 6 HOURS PRN
Qty: 120 TABLET | Refills: 0 | Status: SHIPPED | OUTPATIENT
Start: 2018-06-27 | End: 2018-07-17

## 2018-06-27 NOTE — CHRONIC PAIN
[facility]  Pain Clinic  Follow Up Visit Report       Patient name: Daquan Mena 50year old male  : 12/3/1965  MRN: [id number]  Referring MD: No ref. provider found     COMPLAINT:  Low back pain radiating down to right lower extremity. Cancer Mother    • Other Mother      Diabetes Type 1, High BP, High Cholesterol     SOCIAL HISTORY:     Smoking status: Never Smoker    Smokeless tobacco: Never Used    Alcohol use No     ALLERGIES:  No Known Allergies  CURRENT MEDICATIONS:    Current Outp anorexia, nausea or vomiting, or bowel incontinence   : no dysuria, or pyuria, or bladder incontinence   Endo: no goiter, lethargy, or heat/cold intolerance  Heme/Onc: no pallor, bruising, or bleeding   Musculoskeletal: no changes in strength or swelling Will give to signed opioid contract and will give him prescription for Norco every 6 hours as needed pain. Comprehensive analgesic plan was formulated. Conservative vs. Aggressive measures were discussed at length including pharmacotherapy (eg.  Anti-

## 2018-06-27 NOTE — PROGRESS NOTES
Pt in for injection follow up. States he had two injections the first in the mid back helped about 80% however he had the second injection in the right hip and he thinks it got worse. He is unable to walk for more then 30 minutes.  His primary gave him tram

## 2018-06-28 ENCOUNTER — DOCUMENTATION ONLY (OUTPATIENT)
Dept: PAIN CLINIC | Facility: HOSPITAL | Age: 53
End: 2018-06-28

## 2018-07-16 RX ORDER — HYDROCODONE BITARTRATE AND ACETAMINOPHEN 10; 325 MG/1; MG/1
1 TABLET ORAL EVERY 6 HOURS PRN
Qty: 120 TABLET | Refills: 0 | Status: CANCELLED | OUTPATIENT
Start: 2018-08-24 | End: 2018-09-23

## 2018-07-16 RX ORDER — HYDROCODONE BITARTRATE AND ACETAMINOPHEN 10; 325 MG/1; MG/1
1 TABLET ORAL EVERY 6 HOURS PRN
Qty: 120 TABLET | Refills: 0 | Status: CANCELLED | OUTPATIENT
Start: 2018-07-26 | End: 2018-08-25

## 2018-07-17 ENCOUNTER — OFFICE VISIT (OUTPATIENT)
Dept: PAIN CLINIC | Facility: HOSPITAL | Age: 53
End: 2018-07-17
Attending: NURSE PRACTITIONER
Payer: COMMERCIAL

## 2018-07-17 VITALS — HEART RATE: 74 BPM | SYSTOLIC BLOOD PRESSURE: 122 MMHG | DIASTOLIC BLOOD PRESSURE: 69 MMHG

## 2018-07-17 DIAGNOSIS — M51.36 DEGENERATIVE DISC DISEASE, LUMBAR: Primary | Chronic | ICD-10-CM

## 2018-07-17 PROCEDURE — 99211 OFF/OP EST MAY X REQ PHY/QHP: CPT

## 2018-07-17 NOTE — CHRONIC PAIN
Follow-up Note  CC: right side low back pain   HISTORY OF PRESENT ILLNESS:  Elizabeth Garcia is a 50year old old male, originally referred to the pain clinic by Dr. Watkins ref.  provider found, with history of Degenerative disc disease, lumbar  (primary encounter tablet by mouth 2 (two) times daily. Disp:  Rfl: 1   Liraglutide (VICTOZA) 18 MG/3ML Subcutaneous Solution Pen-injector Inject 0.2 mL into the skin daily.  Disp:  Rfl:         REVIEW OF SYSTEMS:   Bowel/Bladder Incontinence: as above  Coughing/sneezing/stra Mother    • Other Mother      Diabetes Type 1, High BP, High Cholesterol       SOCIAL HISTORY:    Social History  Social History   Marital status:   Spouse name: N/A    Years of education: N/A  Number of children: N/A     Occupational History  None no acute fracture, dislocation or marrow replacing lesion. CORD/CAUDA EQUINA:            Normal caliber, contour, and signal intensity.   Artifact is seen along the dorsal aspect of the cord at the level of T11.  OTHER:             There has been straighte vs. Aggressive measures were discussed at length including pharmacotherapy (eg. Anti- inflammatories, muscle relaxants, neuropathic medications, oral steroids, analgesics), injections, and further testing.  Risks and benefits of all options were discussed a

## 2018-07-20 ENCOUNTER — OFFICE VISIT (OUTPATIENT)
Dept: INTERNAL MEDICINE CLINIC | Facility: CLINIC | Age: 53
End: 2018-07-20
Payer: COMMERCIAL

## 2018-07-20 VITALS
TEMPERATURE: 99 F | HEART RATE: 108 BPM | SYSTOLIC BLOOD PRESSURE: 120 MMHG | WEIGHT: 248.63 LBS | HEIGHT: 67 IN | BODY MASS INDEX: 39.02 KG/M2 | DIASTOLIC BLOOD PRESSURE: 78 MMHG

## 2018-07-20 DIAGNOSIS — E78.00 HYPERCHOLESTEREMIA: ICD-10-CM

## 2018-07-20 DIAGNOSIS — E11.9 TYPE 2 DIABETES MELLITUS WITHOUT COMPLICATION, UNSPECIFIED WHETHER LONG TERM INSULIN USE (HCC): ICD-10-CM

## 2018-07-20 DIAGNOSIS — G47.33 OSA (OBSTRUCTIVE SLEEP APNEA): ICD-10-CM

## 2018-07-20 DIAGNOSIS — M54.31 SCIATICA OF RIGHT SIDE: ICD-10-CM

## 2018-07-20 DIAGNOSIS — D12.6 ADENOMATOUS POLYP OF COLON, UNSPECIFIED PART OF COLON: Primary | ICD-10-CM

## 2018-07-20 DIAGNOSIS — I10 ESSENTIAL HYPERTENSION WITH GOAL BLOOD PRESSURE LESS THAN 140/90: ICD-10-CM

## 2018-07-20 DIAGNOSIS — G56.03 BILATERAL CARPAL TUNNEL SYNDROME: ICD-10-CM

## 2018-07-20 PROCEDURE — 99214 OFFICE O/P EST MOD 30 MIN: CPT | Performed by: INTERNAL MEDICINE

## 2018-07-20 PROCEDURE — 99212 OFFICE O/P EST SF 10 MIN: CPT | Performed by: INTERNAL MEDICINE

## 2018-07-20 NOTE — PROGRESS NOTES
Monty Mayer is a 50year old male. HPI:   Patient presents with:   Follow - Up: F/U for back pain , sleep apnea and pneumococal vaccine      51 y/o M with ZAIDA here for F/U; had +unrefreshing sleep; +daytime hypersomnolence; +snoring; on nasal CPAP 10 CW po qHS x3d, then 300 mg po BID x3d, then 300 mg po TID thereafter Disp: 90 capsule Rfl: 3   ACCU-CHEK MULTICLIX LANCETS Does not apply Misc 1 each by Other route.  Disp:  Rfl:    Cyclobenzaprine HCl 10 MG Oral Tab Take 1 tablet (10 mg total) by mouth 3 (thr ZAIDA  +unrefreshing sleep; +daytime hypersomnolence; +snoring; on nasal CPAP 10 CWP; pt is compliant with CPAP and benefits from its use; DME is [facility]     Carpal tunnel syndrome  Affects both hands; EMG in Feb 2014 showed severe carpal tunn

## 2018-08-06 ENCOUNTER — APPOINTMENT (OUTPATIENT)
Dept: OTHER | Facility: HOSPITAL | Age: 53
End: 2018-08-06
Attending: EMERGENCY MEDICINE

## 2018-08-07 ENCOUNTER — TELEPHONE (OUTPATIENT)
Dept: INTERNAL MEDICINE CLINIC | Facility: CLINIC | Age: 53
End: 2018-08-07

## 2018-08-07 NOTE — TELEPHONE ENCOUNTER
To MD:  The above refill request is for a controlled substance. Please indicate yes or no to refill 30 days supply plus one refill. If more refills are appropriate, please indicate quantity.     Last refilled- 7/16 # 60/2

## 2018-08-08 RX ORDER — TRAMADOL HYDROCHLORIDE 50 MG/1
TABLET ORAL
Qty: 60 TABLET | Refills: 1 | Status: SHIPPED
Start: 2018-08-08 | End: 2018-10-10

## 2018-08-09 NOTE — TELEPHONE ENCOUNTER
[facility] notified to send Tramadol requests to Dr. Misty Forte in [facility].

## 2018-08-09 NOTE — TELEPHONE ENCOUNTER
[place] been [place] tramadol 50 mg po TID since Jan 2017    He takes tramadol for carpal tunnel syndrome; pt was told surgery to be deferred until sugars under \"better control\" with A1C between 8-9    Last visit with Dr Janice Lee

## 2018-08-14 ENCOUNTER — OFFICE VISIT (OUTPATIENT)
Dept: PAIN CLINIC | Facility: HOSPITAL | Age: 53
End: 2018-08-14
Attending: NURSE PRACTITIONER
Payer: COMMERCIAL

## 2018-08-14 ENCOUNTER — DOCUMENTATION ONLY (OUTPATIENT)
Dept: PAIN CLINIC | Facility: HOSPITAL | Age: 53
End: 2018-08-14

## 2018-08-14 DIAGNOSIS — M54.16 RIGHT LUMBAR RADICULOPATHY: Chronic | ICD-10-CM

## 2018-08-14 DIAGNOSIS — M51.36 DEGENERATIVE DISC DISEASE, LUMBAR: Primary | Chronic | ICD-10-CM

## 2018-08-14 PROCEDURE — 99211 OFF/OP EST MAY X REQ PHY/QHP: CPT

## 2018-08-14 RX ORDER — HYDROCODONE BITARTRATE AND ACETAMINOPHEN 10; 325 MG/1; MG/1
1 TABLET ORAL EVERY 8 HOURS PRN
Qty: 42 TABLET | Refills: 0 | Status: SHIPPED | OUTPATIENT
Start: 2018-08-14 | End: 2018-08-28

## 2018-08-14 RX ORDER — HYDROCODONE BITARTRATE AND ACETAMINOPHEN 10; 325 MG/1; MG/1
1 TABLET ORAL EVERY 6 HOURS PRN
COMMUNITY
End: 2018-08-14

## 2018-08-14 NOTE — CHRONIC PAIN
Follow-up Note  CC: S/P TF MORENA 7/2/18 states pain reoccurred after 3 days he had his follow-up here at clinic has been standing a lot at work.    HISTORY OF PRESENT ILLNESS:  Thao Valiente is a 50year old old male, originally referred to the pain clinic b MINI U/F 31G X 5 MM Does not apply Misc USE AND DISCARD 1 PEN NEEDLE ONCE A DAY Disp:  Rfl: 0   gabapentin 300 MG Oral Cap 300 mg po qHS x3d, then 300 mg po BID x3d, then 300 mg po TID thereafter Disp: 90 capsule Rfl: 3   ACCU-CHEK MULTICLIX LANCETS Does n History of varicocele 2011    repair -  in [place]   • Hypercholesterolemia    • Hyperlipidemia    • Hypogonadism in male     testosterone level low, no supplement recommended   • Oligospermia    • Screening PSA (prostate specific antigen) 10- KNEES    Degree Pain   Right Flexion   120 No   Right Extension 0 No   Left Flexion 120 No   Left Extension 0 No     MOTOR EXAMINATION:  LOWER EXTREMITY      LEFT RIGHT        Quadriceps 4/5 4/5   Foot DF 5/5 5/5   Foot EHL 5/5 5/5   Gastrocnemius formulated. Conservative vs. Aggressive measures were discussed at length including pharmacotherapy (eg. Anti- inflammatories, muscle relaxants, neuropathic medications, oral steroids, analgesics), injections, and further testing.  Risks and benefits of all

## 2018-08-16 RX ORDER — CYCLOBENZAPRINE HCL 10 MG
TABLET ORAL
Qty: 42 TABLET | Refills: 2 | Status: SHIPPED | OUTPATIENT
Start: 2018-08-16 | End: 2021-06-08 | Stop reason: ALTCHOICE

## 2018-08-29 ENCOUNTER — TELEPHONE (OUTPATIENT)
Dept: PAIN CLINIC | Facility: HOSPITAL | Age: 53
End: 2018-08-29

## 2018-09-10 NOTE — PROGRESS NOTES
Patient presents to [facility] ambulatory for med eval.  He has chronic low right back pain that radiates to his right leg. He was given a 3 week supply of norco at last visit to get him to his injection which was scheduled for 9-12 but is now cancelled.   He is

## 2018-09-10 NOTE — CHRONIC PAIN
Follow-up Note  CC: S/P TF MORENA 7/2/18 states pain reoccurred after 3 days he had his follow-up here at clinic has been standing a lot at work.    HISTORY OF PRESENT ILLNESS:  Sarah Garcia is a 50year old old male presents here today with his child, Garo Enriquez (TRESIBA FLEXTOUCH) 200 UNIT/ML Subcutaneous Solution Pen-injector 40 units s.c in am  Disp:  Rfl:    Losartan Potassium 50 MG Oral Tab Take 50 mg by mouth once daily.  Disp:  Rfl: 1   BD PEN NEEDLE MINI U/F 31G X 5 MM Does not apply Misc USE AND DISCARD 1 • Hyperlipidemia    • Hypogonadism in male     testosterone level low, no supplement recommended   • Oligospermia    • Screening PSA (prostate specific antigen) 10-       SURGICAL HISTORY:  History reviewed. No pertinent surgical history.     FAMI file    ADVANCE CARE PLANNING:    Advance Care Plan NOT discussed. PHYSICAL EXAMINATION:  There were no vitals filed for this visit.    General: Alert and oriented x3, NAD, appears stated age, appropriate disposition and demeanor, answers questions appropr week supply) 10/325 1 every 8 hours PRN pain) script to get pain control until Repeat injection ;  Instructed to try the tramadol first  -schedule repeat injection TFESI will move up to Wednesday 9/12  -rest; encouraged to continue weight loss and HEP to st

## 2018-10-10 ENCOUNTER — OFFICE VISIT (OUTPATIENT)
Dept: PAIN CLINIC | Facility: HOSPITAL | Age: 53
End: 2018-10-10
Attending: ANESTHESIOLOGY
Payer: COMMERCIAL

## 2018-10-10 VITALS — WEIGHT: 253 LBS | RESPIRATION RATE: 18 BRPM | BODY MASS INDEX: 39.71 KG/M2 | HEIGHT: 67 IN

## 2018-10-10 DIAGNOSIS — M51.36 DEGENERATIVE DISC DISEASE, LUMBAR: Primary | Chronic | ICD-10-CM

## 2018-10-10 DIAGNOSIS — M54.16 RIGHT LUMBAR RADICULOPATHY: Chronic | ICD-10-CM

## 2018-10-10 PROCEDURE — 99211 OFF/OP EST MAY X REQ PHY/QHP: CPT

## 2018-10-10 RX ORDER — TRAMADOL HYDROCHLORIDE 50 MG/1
TABLET ORAL
Qty: 90 TABLET | Refills: 1 | Status: SHIPPED | OUTPATIENT
Start: 2018-10-10 | End: 2018-10-31

## 2018-10-10 NOTE — CHRONIC PAIN
[facility] HOSP - [facility]   Acute Pain Rounds Note  10/10/2018    Patient name: Shanna Argueta 50year old male  : 12/3/1965  MRN: [id number]    Diagnosis: (M51.36) Degenerative disc disease, lumbar  (primary encounter diagnosis)  Plan:     (M54.16) Ri

## 2018-10-10 NOTE — PROGRESS NOTES
INITIAL CONSULT:  06/06/18  PRESENTS AMBULATORY TO CPM;  NEW CONSULT C/O RLBP RADIATING BRO THE RLE;  RATES HIS PAIN 9/10;  PT REPORTS BACK PAIN SINCE 1992;  \"GOTTEN WORSE OVER THE YEARS;  HAD 4WKS OF P.T. WHICH DID NOT HELP ;  PT WORKS AS A [employer]

## 2018-10-22 NOTE — TELEPHONE ENCOUNTER
Pt called and explained that Lamisil will not be refilled. Dr. Moose Drake retired. Instructed on Dr. Hidalgo and Dr. Tena Thornton. Instructed on office locations and on days of the week for  Scheduling. Pt given office phone number once decided on scheduling an appointment. Verbalizes understanding  Call to pharmacy. Left message.  No refill on Lamisil

## 2018-10-31 ENCOUNTER — OFFICE VISIT (OUTPATIENT)
Dept: PAIN CLINIC | Facility: HOSPITAL | Age: 53
End: 2018-10-31
Attending: ANESTHESIOLOGY
Payer: COMMERCIAL

## 2018-10-31 VITALS
WEIGHT: 253 LBS | HEIGHT: 67 IN | BODY MASS INDEX: 39.71 KG/M2 | RESPIRATION RATE: 18 BRPM | DIASTOLIC BLOOD PRESSURE: 72 MMHG | SYSTOLIC BLOOD PRESSURE: 138 MMHG

## 2018-10-31 DIAGNOSIS — M54.16 RIGHT LUMBAR RADICULOPATHY: Primary | Chronic | ICD-10-CM

## 2018-10-31 PROCEDURE — 20552 NJX 1/MLT TRIGGER POINT 1/2: CPT

## 2018-10-31 PROCEDURE — 99213 OFFICE O/P EST LOW 20 MIN: CPT

## 2018-10-31 PROCEDURE — 99211 OFF/OP EST MAY X REQ PHY/QHP: CPT

## 2018-10-31 RX ORDER — HYDROCODONE BITARTRATE AND ACETAMINOPHEN 10; 325 MG/1; MG/1
1 TABLET ORAL EVERY 6 HOURS PRN
Qty: 120 TABLET | Refills: 0 | Status: SHIPPED | OUTPATIENT
Start: 2018-11-30 | End: 2018-11-30

## 2018-10-31 RX ORDER — HYDROCODONE BITARTRATE AND ACETAMINOPHEN 10; 325 MG/1; MG/1
1 TABLET ORAL EVERY 6 HOURS PRN
Qty: 120 TABLET | Refills: 0 | Status: SHIPPED | OUTPATIENT
Start: 2018-10-31 | End: 2018-10-31

## 2018-10-31 RX ORDER — HYDROCODONE BITARTRATE AND ACETAMINOPHEN 10; 325 MG/1; MG/1
1 TABLET ORAL EVERY 6 HOURS PRN
COMMUNITY
End: 2019-01-22

## 2018-10-31 RX ORDER — NALOXONE HYDROCHLORIDE 4 MG/.1ML
4 SPRAY, METERED NASAL AS NEEDED
Qty: 1 KIT | Refills: 0 | Status: SHIPPED | OUTPATIENT
Start: 2018-10-31 | End: 2018-11-30

## 2018-10-31 NOTE — CHRONIC PAIN
[facility]  Pain Clinic  Follow Up Visit Report       Patient name: Claudia Peterson 50year old male  : 12/3/1965  MRN: [id number]  Referring MD:  Andrea Amador MD    COMPLAINT:  Patient still continue complaining of low back pain rad Allergies  CURRENT MEDICATIONS:    Current Outpatient Medications:   •  HYDROcodone-acetaminophen  MG Oral Tab, Take 1 tablet by mouth every 6 (six) hours as needed for Pain., Disp: , Rfl:   •  gabapentin 300 MG Oral Cap, 300 mg po qHS x3d, then 300 (114.8 kg)   BMI 39.63 kg/m²   General: Alert and oriented x3, NAD, appears stated age, appropriate disposition and demeanor, answers questions appropriately   Head: normocephalic, atraumatic  Eyes: anicteric; no injection  Ears: no obvious deformities not

## 2018-11-28 ENCOUNTER — NURSE ONLY (OUTPATIENT)
Dept: INTERNAL MEDICINE CLINIC | Facility: CLINIC | Age: 53
End: 2018-11-28
Payer: COMMERCIAL

## 2018-11-28 ENCOUNTER — TELEPHONE (OUTPATIENT)
Dept: INTERNAL MEDICINE CLINIC | Facility: CLINIC | Age: 53
End: 2018-11-28

## 2018-11-28 DIAGNOSIS — Z23 NEED FOR VACCINATION: Primary | ICD-10-CM

## 2018-11-28 DIAGNOSIS — Z11.1 PPD SCREENING TEST: ICD-10-CM

## 2018-11-28 PROCEDURE — 90471 IMMUNIZATION ADMIN: CPT | Performed by: INTERNAL MEDICINE

## 2018-11-28 PROCEDURE — 86580 TB INTRADERMAL TEST: CPT | Performed by: INTERNAL MEDICINE

## 2018-11-28 PROCEDURE — 90686 IIV4 VACC NO PRSV 0.5 ML IM: CPT | Performed by: INTERNAL MEDICINE

## 2018-11-28 NOTE — PROGRESS NOTES
Patient presents for TB test need for employer at [employer] as well as annual flu vaccine. No order located in [facility]. D/w with Dr. Supriya Haddad. MD gave verbal order to administer TB test. Orders entered. Name and  verified.  TB administered to right fo

## 2018-11-28 NOTE — TELEPHONE ENCOUNTER
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 Can he schedule one for today? He also would like a flu shot at the same time.

## 2018-11-28 NOTE — TELEPHONE ENCOUNTER
Pt called back and is wondering what is being done with this. Pt was informed that we are waiting on Dr Torsten Shane to see the message and put the orders in the system. Was rude and stated that he wants this done ASAP.

## 2018-11-29 NOTE — TELEPHONE ENCOUNTER
To Dr Jossy Ricketts and Dr Jessica Buchanan  Please see message and patient's request below. Dr Lazarus Heys has no openings. Dr Jessica Buchanan has a few. Please advise if patient can be seen by another physician for a physical and form fill out.  He is coming tomorrow to have his TB te

## 2018-11-29 NOTE — TELEPHONE ENCOUNTER
Please call pt, will be in office tomorrow to have TB test read, has just learned that he will also need a physical for new employer as soon as possible, pt also has form that needs completion by Dr Tena Miranda  Can pt be seen for physical tomorrow by Dr Ryan Rodriguez

## 2018-11-30 ENCOUNTER — OFFICE VISIT (OUTPATIENT)
Dept: INTERNAL MEDICINE CLINIC | Facility: CLINIC | Age: 53
End: 2018-11-30
Payer: COMMERCIAL

## 2018-11-30 VITALS
WEIGHT: 244 LBS | TEMPERATURE: 99 F | SYSTOLIC BLOOD PRESSURE: 120 MMHG | BODY MASS INDEX: 38.3 KG/M2 | HEART RATE: 88 BPM | DIASTOLIC BLOOD PRESSURE: 72 MMHG | HEIGHT: 67 IN

## 2018-11-30 DIAGNOSIS — E11.9 TYPE 2 DIABETES MELLITUS WITHOUT COMPLICATION, UNSPECIFIED WHETHER LONG TERM INSULIN USE (HCC): ICD-10-CM

## 2018-11-30 DIAGNOSIS — E78.00 HYPERCHOLESTEREMIA: ICD-10-CM

## 2018-11-30 DIAGNOSIS — G47.33 OSA (OBSTRUCTIVE SLEEP APNEA): ICD-10-CM

## 2018-11-30 DIAGNOSIS — I10 ESSENTIAL HYPERTENSION WITH GOAL BLOOD PRESSURE LESS THAN 140/90: ICD-10-CM

## 2018-11-30 DIAGNOSIS — Z00.00 PHYSICAL EXAM, ANNUAL: Primary | ICD-10-CM

## 2018-11-30 DIAGNOSIS — D12.6 ADENOMATOUS POLYP OF COLON, UNSPECIFIED PART OF COLON: ICD-10-CM

## 2018-11-30 DIAGNOSIS — G56.03 BILATERAL CARPAL TUNNEL SYNDROME: ICD-10-CM

## 2018-11-30 DIAGNOSIS — M54.31 SCIATICA OF RIGHT SIDE: ICD-10-CM

## 2018-11-30 PROCEDURE — 99396 PREV VISIT EST AGE 40-64: CPT | Performed by: INTERNAL MEDICINE

## 2018-11-30 NOTE — PROGRESS NOTES
Danyelle So is a 50year old male.     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 Disp:  Rfl:    Atorvastatin Calcium (LIPITOR) 20 MG Oral Tab Take 20 mg by mouth once daily.  Disp:  Rfl: 1 non-distended  Extremities: no clubbing, cyanosis or edema  Vascular: no carotid bruits; DP/PT 2/2  Musculoskeletal: Motor 5/5 upper and lower extremities  Neurological: cranial nerves II-XII intact; light touch and proprioception intact  Skin: no rash or anterolisthesis of L5 on S1; s/p physical therapy; no longer taking tramadol 50 mg po TID prn, or Norco prn; takes cyclobenzaprine 10 mg po TID prn; uses meloxicam 15 mg po qD prn; s/p LESI x3 with decrease in pain; F/U Pain Clinic prn        RTC 6 mos   c

## 2018-12-11 RX ORDER — TRAMADOL HYDROCHLORIDE 50 MG/1
50 TABLET ORAL EVERY 8 HOURS PRN
Qty: 90 TABLET | Refills: 2 | OUTPATIENT
Start: 2018-12-11

## 2019-01-03 RX ORDER — TERBINAFINE HYDROCHLORIDE 250 MG/1
250 TABLET ORAL DAILY
Qty: 90 TABLET | Refills: 0 | OUTPATIENT
Start: 2019-01-03

## 2019-01-24 NOTE — CHRONIC PAIN
[facility]  Pain Clinic  Follow Up Visit Report       Patient name: Claudia Peterson 48year old male  : 12/3/1965  MRN: [id number]  Referring MD: Pelon Oro MD    COMPLAINT:  Patient presents with:  Medication Eval: CHRONIC RLBP W HYDROcodone-acetaminophen  MG Oral Tab, Take 1 tablet by mouth every 6 (six) hours as needed for Pain., Disp: 120 tablet, Rfl: 0  •  HYDROcodone-acetaminophen  MG Oral Tab, Take 1 tablet by mouth every 6 (six) hours as needed for Pain., Disp: 1 Head: normocephalic, atraumatic  Eyes: anicteric; no injection  Ears: no obvious deformities noted   Nose: externally grossly within normal limits, no unusual discharge or rhinorrhea   Throat: lips grossly within normal limits by visual exam externally

## 2019-01-28 ENCOUNTER — DOCUMENTATION ONLY (OUTPATIENT)
Dept: PAIN CLINIC | Facility: HOSPITAL | Age: 54
End: 2019-01-28

## 2019-03-02 NOTE — PROGRESS NOTES
Patient presents to [facility] ambulatory for med eval.  He has chronic low back pain that radiates to his right leg with numbness and tingling.   The morning when he gets out of bed is when his pain is the worst.  He has been taking norco 4 times a day, his job r [employer]: sees adequately in most situations; can see medication labels, newsprint

## 2019-03-11 ENCOUNTER — TELEPHONE (OUTPATIENT)
Dept: GASTROENTEROLOGY | Facility: CLINIC | Age: 54
End: 2019-03-11

## 2019-03-11 NOTE — TELEPHONE ENCOUNTER
----- Message from Sylwia Menon LPN sent at 0/42/0176  2:17 PM CDT -----  Regarding: Recall Colonoscopy/EGD  Recall for 1 year colonoscopy and EGD per . Colonoscopy done 5/11/18.

## 2019-03-11 NOTE — TELEPHONE ENCOUNTER
Recall colon letter mailed to pt. Do not see note for a recall EGD. EGD was not performed in 05/2018.

## 2019-04-11 ENCOUNTER — OFFICE VISIT (OUTPATIENT)
Dept: PAIN CLINIC | Facility: HOSPITAL | Age: 54
End: 2019-04-11
Attending: ANESTHESIOLOGY
Payer: COMMERCIAL

## 2019-04-11 VITALS — BODY MASS INDEX: 39.71 KG/M2 | HEIGHT: 67 IN | RESPIRATION RATE: 18 BRPM | WEIGHT: 253 LBS

## 2019-04-11 DIAGNOSIS — M51.36 DEGENERATIVE DISC DISEASE, LUMBAR: Chronic | ICD-10-CM

## 2019-04-11 DIAGNOSIS — M54.16 RIGHT LUMBAR RADICULOPATHY: Primary | Chronic | ICD-10-CM

## 2019-04-11 PROCEDURE — 99211 OFF/OP EST MAY X REQ PHY/QHP: CPT

## 2019-04-11 RX ORDER — HYDROCODONE BITARTRATE AND ACETAMINOPHEN 10; 325 MG/1; MG/1
1 TABLET ORAL EVERY 6 HOURS PRN
Qty: 120 TABLET | Refills: 0 | Status: SHIPPED | OUTPATIENT
Start: 2019-04-11 | End: 2019-05-11

## 2019-04-11 RX ORDER — HYDROCODONE BITARTRATE AND ACETAMINOPHEN 10; 325 MG/1; MG/1
1 TABLET ORAL EVERY 6 HOURS PRN
Qty: 120 TABLET | Refills: 0 | Status: SHIPPED | OUTPATIENT
Start: 2019-05-11 | End: 2019-06-10

## 2019-04-11 RX ORDER — HYDROCODONE BITARTRATE AND ACETAMINOPHEN 10; 325 MG/1; MG/1
1 TABLET ORAL EVERY 6 HOURS PRN
COMMUNITY
End: 2019-06-19

## 2019-04-11 NOTE — CHRONIC PAIN
[facility]  Pain Clinic  Follow Up Visit Report       Patient name: Maximiliano Stephens 48year old male  : 12/3/1965  MRN: [id number]  Referring MD: Rowan Simmons MD    COMPLAINT:  Patient presents with:  Medication Eval: chronic back p Allergies  CURRENT MEDICATIONS:    Current Outpatient Medications:   •  [START ON 5/11/2019] HYDROcodone-acetaminophen  MG Oral Tab, Take 1 tablet by mouth every 6 (six) hours as needed for Pain., Disp: 120 tablet, Rfl: 0  •  HYDROcodone-acetaminophe strength or swelling   Neuro: no headache or LOC   Psych: no change in personality, affect, or depression   PHYSICAL EXAMINATION:  Resp 18   Ht 5' 7\" (1.702 m)   Wt 253 lb (114.8 kg)   BMI 39.63 kg/m²   General: Alert and oriented x3, NAD, appears stated

## 2019-05-28 ENCOUNTER — TELEPHONE (OUTPATIENT)
Dept: INTERNAL MEDICINE CLINIC | Facility: CLINIC | Age: 54
End: 2019-05-28

## 2019-05-28 RX ORDER — AMOXICILLIN 875 MG/1
875 TABLET, COATED ORAL 2 TIMES DAILY
Qty: 20 TABLET | Refills: 0 | Status: SHIPPED | OUTPATIENT
Start: 2019-05-28 | End: 2019-06-07

## 2019-05-28 NOTE — TELEPHONE ENCOUNTER
To nursing, tell pt with his symptoms and his family members having strep, I would start him on an antibiotic. Please verify no med allergies. Then can send in amoxicillin 875 mg bid #20 to his pharmacy.    Can cancel appt for tomorrow unless he would l

## 2019-05-28 NOTE — TELEPHONE ENCOUNTER
Patient is asking for prescription for strep. Wife and son both have/had strep. Patient is starting to get symptoms, sore throat fells tight, to swollow is sore, when drinking has burning sensation on throat. [facility].     Best number for

## 2019-05-28 NOTE — TELEPHONE ENCOUNTER
Spoke to patient and relayed MD message and instruction, patient verbalized understanding. Patient confirmed he has no medication allergies that he knows of. ERx'd amoxicillin as written below by MD to patient pharmacy.  Patient requests appt for tomorrow b

## 2019-05-28 NOTE — TELEPHONE ENCOUNTER
To Dr. Zaina esquivel - see below. Onset of sx: this AM.  Denies fever/chills. Advised UC as there are no openings here today.   Pt requested appt tomorrow, no openings with Dr. Amirah Patiño - appt made with you for 10:30 tomorrow - pt was unable to make 7:3

## 2019-06-20 ENCOUNTER — OFFICE VISIT (OUTPATIENT)
Dept: PAIN CLINIC | Facility: HOSPITAL | Age: 54
End: 2019-06-20
Attending: NURSE PRACTITIONER
Payer: COMMERCIAL

## 2019-06-20 VITALS
DIASTOLIC BLOOD PRESSURE: 76 MMHG | HEIGHT: 67 IN | RESPIRATION RATE: 18 BRPM | WEIGHT: 253 LBS | BODY MASS INDEX: 39.71 KG/M2 | SYSTOLIC BLOOD PRESSURE: 132 MMHG

## 2019-06-20 DIAGNOSIS — M51.36 DEGENERATIVE DISC DISEASE, LUMBAR: Primary | Chronic | ICD-10-CM

## 2019-06-20 PROCEDURE — 99211 OFF/OP EST MAY X REQ PHY/QHP: CPT

## 2019-06-20 RX ORDER — HYDROCODONE BITARTRATE AND ACETAMINOPHEN 10; 325 MG/1; MG/1
1 TABLET ORAL EVERY 6 HOURS PRN
Qty: 120 TABLET | Refills: 0 | Status: SHIPPED | OUTPATIENT
Start: 2019-07-20 | End: 2019-08-19

## 2019-06-20 RX ORDER — HYDROCODONE BITARTRATE AND ACETAMINOPHEN 10; 325 MG/1; MG/1
1 TABLET ORAL EVERY 6 HOURS PRN
Qty: 120 TABLET | Refills: 0 | Status: SHIPPED | OUTPATIENT
Start: 2019-06-20 | End: 2019-07-20

## 2019-06-20 NOTE — CHRONIC PAIN
MEDICATION EVALUATION  HISTORY OF PRESENT ILLNESS:  Mynor Cardenas is a 48year old old male with history of Degenerative disc disease, lumbar  (primary encounter diagnosis) who returns for medication evaluation.  Patient continues to report low back pain a SYSTEMS:   Bowel/Bladder Incontinence: as above  Coughing/sneezing/straining does not exacerbate the pain.   Numbness/tingling: as above  Weakness: as above  Weight Loss: Negative   Fever: Negative   Cardiovascular:  No current chest pain or palpitations file    Occupational History      Not on file    Social Needs      Financial resource strain: Not on file      Food insecurity:        Worry: Not on file        Inability: Not on file      Transportation needs:        Medical: Not on file        Non-medica injection  Ears: no obvious deformities noted   Nose: externally grossly within normal limits, no unusual discharge or rhinorrhea   Throat: lips grossly within normal limits by visual exam externally  Neck: supple, trachea midline, no obvious JVD  Chest: disc/facet abnormality, spinal stenosis, or foraminal stenosis. L4-L5:   Disk desiccation with bulging disk, facet, and ligamentous hypertrophy results in mild central canal narrowing and mild bilateral neural foraminal narrowing.  Small 0.7 cm ganglion steroids, analgesics), injections, and further testing. Risks and benefits of all options were discussed at length to patients satisfaction during a comprehensive interactive discussion.  All questions were answered during extended questions and answer sess

## 2019-08-30 ENCOUNTER — OFFICE VISIT (OUTPATIENT)
Dept: PAIN CLINIC | Facility: HOSPITAL | Age: 54
End: 2019-08-30
Attending: NURSE PRACTITIONER
Payer: COMMERCIAL

## 2019-08-30 VITALS — SYSTOLIC BLOOD PRESSURE: 116 MMHG | HEART RATE: 69 BPM | DIASTOLIC BLOOD PRESSURE: 73 MMHG

## 2019-08-30 DIAGNOSIS — M47.817 FACET ARTHRITIS OF LUMBOSACRAL REGION: ICD-10-CM

## 2019-08-30 DIAGNOSIS — M54.10 RADICULAR PAIN OF RIGHT LOWER EXTREMITY: ICD-10-CM

## 2019-08-30 DIAGNOSIS — M48.062 SPINAL STENOSIS OF LUMBAR REGION WITH NEUROGENIC CLAUDICATION: ICD-10-CM

## 2019-08-30 DIAGNOSIS — M51.36 DEGENERATIVE DISC DISEASE, LUMBAR: Primary | ICD-10-CM

## 2019-08-30 DIAGNOSIS — F11.90 CHRONIC, CONTINUOUS USE OF OPIOIDS: ICD-10-CM

## 2019-08-30 PROCEDURE — 99211 OFF/OP EST MAY X REQ PHY/QHP: CPT

## 2019-08-30 RX ORDER — HYDROCODONE BITARTRATE AND ACETAMINOPHEN 10; 325 MG/1; MG/1
1 TABLET ORAL EVERY 6 HOURS PRN
Qty: 120 TABLET | Refills: 0 | Status: SHIPPED | OUTPATIENT
Start: 2019-08-30 | End: 2019-09-29

## 2019-08-30 RX ORDER — HYDROCODONE BITARTRATE AND ACETAMINOPHEN 10; 325 MG/1; MG/1
1 TABLET ORAL EVERY 6 HOURS PRN
COMMUNITY
End: 2019-10-11

## 2019-08-30 RX ORDER — HYDROCODONE BITARTRATE AND ACETAMINOPHEN 10; 325 MG/1; MG/1
1 TABLET ORAL EVERY 6 HOURS PRN
Qty: 120 TABLET | Refills: 0 | Status: SHIPPED | OUTPATIENT
Start: 2019-09-28 | End: 2019-10-11

## 2019-08-30 NOTE — PROGRESS NOTES
Patient presents to [facility] ambulatory for med eval.  He has chronic low back pain that radiates to his right leg and new to his left buttock with n/t in his right leg. He takes 3-4 norco per day which helps a lot per patient.   Pt is a maintenace worker at a

## 2019-08-30 NOTE — CHRONIC PAIN
MEDICATION EVALUATION  HISTORY OF PRESENT ILLNESS:  Maximiliano Stephens is a 48year old old male with history of Degenerative disc disease, lumbar  (primary encounter diagnosis)  Chronic, continuous use of opioids  Facet arthritis of lumbosacral region ([facility] [address] Degludec (TRESIBA FLEXTOUCH) 200 UNIT/ML Subcutaneous Solution Pen-injector 40 units s.c in am  Disp:  Rfl:    Losartan Potassium 50 MG Oral Tab Take 50 mg by mouth once daily.  Disp:  Rfl: 1   BD PEN NEEDLE MINI U/F 31G X 5 MM Does not apply Misc USE AND D No pertinent surgical history.     FAMILY HISTORY:  Family History   Problem Relation Age of Onset   • Stroke Father    • Diabetes Father    • Hypertension Father    • Lipids Father    • Heart Disease Father         CAD   • Other Father         Strokes, Hig Asked        Caffeine Concern: No        Occupational Exposure: Not Asked        Hobby Hazards: Not Asked        Sleep Concern: Not Asked        Stress Concern: Not Asked        Weight Concern: Not Asked        Special Diet: Not Asked        Back Care: Not hypertrophic changes of the lower lumbar facet joints. Small anterior endplate osteophytes seen within the lumbar vertebral bodies. There is no acute fracture, dislocation or marrow replacing lesion.   CORD/CAUDA EQUINA:            Normal caliber, contour, with neurogenic claudication  Radicular pain of right lower extremity. Patient has been managed with norco and injections . PLAN:   - plan to repeat TFESI, likely bilateral   - CPM with norco . MME/day= 40.   Discussed with patient the risks of opioid

## 2019-09-06 ENCOUNTER — DOCUMENTATION ONLY (OUTPATIENT)
Dept: PAIN CLINIC | Facility: HOSPITAL | Age: 54
End: 2019-09-06

## 2019-09-06 NOTE — PROGRESS NOTES
Procedure code [id number] for 09/13/19    [facility] @ # [phone] @ 8:09am    Per automated system no PA required     Ref # [id number]    Order form sent to the surgery center, confirmation rcv'd

## 2019-10-14 ENCOUNTER — OFFICE VISIT (OUTPATIENT)
Dept: PAIN CLINIC | Facility: HOSPITAL | Age: 54
End: 2019-10-14
Attending: ANESTHESIOLOGY
Payer: COMMERCIAL

## 2019-10-14 VITALS
WEIGHT: 253 LBS | RESPIRATION RATE: 18 BRPM | SYSTOLIC BLOOD PRESSURE: 134 MMHG | DIASTOLIC BLOOD PRESSURE: 91 MMHG | BODY MASS INDEX: 39.71 KG/M2 | HEIGHT: 67 IN | HEART RATE: 83 BPM

## 2019-10-14 DIAGNOSIS — M54.16 RIGHT LUMBAR RADICULOPATHY: Primary | Chronic | ICD-10-CM

## 2019-10-14 PROCEDURE — 99211 OFF/OP EST MAY X REQ PHY/QHP: CPT

## 2019-10-14 RX ORDER — HYDROCODONE BITARTRATE AND ACETAMINOPHEN 10; 325 MG/1; MG/1
1 TABLET ORAL EVERY 6 HOURS PRN
Qty: 120 TABLET | Refills: 0 | Status: SHIPPED | OUTPATIENT
Start: 2019-11-27 | End: 2019-12-27

## 2019-10-14 RX ORDER — HYDROCODONE BITARTRATE AND ACETAMINOPHEN 10; 325 MG/1; MG/1
1 TABLET ORAL EVERY 6 HOURS PRN
Qty: 120 TABLET | Refills: 0 | Status: SHIPPED | OUTPATIENT
Start: 2019-10-28 | End: 2019-11-27

## 2019-10-14 NOTE — CHRONIC PAIN
[facility]  Pain Clinic  Follow Up Visit Report       Patient name: Mari Staley 48year old male  : 12/3/1965  MRN: [id number]  Referring MD: Santi Manley MD    COMPLAINT:  Patient presents with:  Medication Eval: [facility] 11/27/2019] HYDROcodone-acetaminophen  MG Oral Tab, Take 1 tablet by mouth every 6 (six) hours as needed for Pain (max 4 per day). , Disp: 120 tablet, Rfl: 0  •  [START ON 10/28/2019] HYDROcodone-acetaminophen  MG Oral Tab, Take 1 tablet by mout NAD, appears stated age, appropriate disposition and demeanor, answers questions appropriately   Head: normocephalic, atraumatic  Eyes: anicteric; no injection  Ears: no obvious deformities noted   Nose: externally grossly within normal limits, no unusual

## 2019-11-26 ENCOUNTER — TELEPHONE (OUTPATIENT)
Dept: INTERNAL MEDICINE CLINIC | Facility: CLINIC | Age: 54
End: 2019-11-26

## 2019-11-26 ENCOUNTER — OFFICE VISIT (OUTPATIENT)
Dept: INTERNAL MEDICINE CLINIC | Facility: CLINIC | Age: 54
End: 2019-11-26
Payer: COMMERCIAL

## 2019-11-26 VITALS
DIASTOLIC BLOOD PRESSURE: 78 MMHG | WEIGHT: 236 LBS | HEART RATE: 96 BPM | SYSTOLIC BLOOD PRESSURE: 128 MMHG | BODY MASS INDEX: 37.04 KG/M2 | TEMPERATURE: 99 F | OXYGEN SATURATION: 98 % | HEIGHT: 67 IN | RESPIRATION RATE: 16 BRPM

## 2019-11-26 DIAGNOSIS — E11.9 TYPE 2 DIABETES MELLITUS WITHOUT COMPLICATION, UNSPECIFIED WHETHER LONG TERM INSULIN USE (HCC): ICD-10-CM

## 2019-11-26 DIAGNOSIS — E78.00 HYPERCHOLESTEREMIA: ICD-10-CM

## 2019-11-26 DIAGNOSIS — B34.9 VIRAL SYNDROME: Primary | ICD-10-CM

## 2019-11-26 DIAGNOSIS — I10 ESSENTIAL HYPERTENSION: ICD-10-CM

## 2019-11-26 PROCEDURE — 99213 OFFICE O/P EST LOW 20 MIN: CPT | Performed by: INTERNAL MEDICINE

## 2019-11-26 RX ORDER — PROMETHAZINE HYDROCHLORIDE AND CODEINE PHOSPHATE 6.25; 1 MG/5ML; MG/5ML
2.5 SYRUP ORAL EVERY 4 HOURS PRN
Qty: 118 ML | Refills: 0 | Status: SHIPPED | OUTPATIENT
Start: 2019-11-26 | End: 2020-07-21 | Stop reason: ALTCHOICE

## 2019-11-26 RX ORDER — OSELTAMIVIR PHOSPHATE 75 MG/1
75 CAPSULE ORAL 2 TIMES DAILY
Qty: 10 CAPSULE | Refills: 0 | Status: SHIPPED | OUTPATIENT
Start: 2019-11-26 | End: 2020-07-21 | Stop reason: ALTCHOICE

## 2019-11-26 NOTE — PROGRESS NOTES
HPI:    Patient ID: Rubén aHnson is a 48year old male. C/o cough productive with yellow sputum and sore throat that started 4 days ago. 2 days ago noted increasing cough with malaise/ weakness, chills, fevers up to 102, very poor appetite.   No N/V t A DAY AS NEEDED FOR MUSCLE SPASMS 42 tablet 2   • Insulin Degludec (TRESIBA FLEXTOUCH) 200 UNIT/ML Subcutaneous Solution Pen-injector 40 units s.c in am      • Losartan Potassium 50 MG Oral Tab Take 50 mg by mouth once daily.   1   • BD PEN NEEDLE MINI U/F and aches. We will also prescribe Phenergan cough syrup with codeine 5 cc p.o. every 4 hours as needed to help with cough and congestion. Patient will call if no symptomatic improvement over the next 24 to 48 hours. Blood pressure is controlled.     Pa

## 2019-11-26 NOTE — TELEPHONE ENCOUNTER
Pt's whole family is sick  Wife started z nesha 2 days ago  Fever 101 since yesterday  Maintaining w/tylenol  Ribs are \"killing him\" from coughing    Can on call associate prescribe for patient since Dr Charles Trejo is not in the office    Call back to pt at 79

## 2019-11-26 NOTE — TELEPHONE ENCOUNTER
Patient reports his whole family had been sick, wife recently started on z-nesha and diagnosed with bronchitis. Patient reports yesterday he had a fever of 101, he took some tylenol and went to work.  When he came home his fever was 103, went down to 102, and

## 2020-01-17 ENCOUNTER — TELEPHONE (OUTPATIENT)
Dept: INTERNAL MEDICINE CLINIC | Facility: CLINIC | Age: 55
End: 2020-01-17

## 2020-01-17 NOTE — TELEPHONE ENCOUNTER
I spoke with patient and relayed Dr. Lee Fees message. He verbalized understanding. Explained that he should go to [facility] ER or he should go to [facility] immediate care. I recommended [facility] location and provided him with the address.  He says he will check with

## 2020-01-17 NOTE — TELEPHONE ENCOUNTER
Patient was seen in November for URI. Since 5 days ago, patient has a cough (dry/some phlegm sometimes). Ribs hurt from coughing. Temp is l0l - 2 hours ago it was 102. Had to be driven home from work. No sore throat.   Some wheezing in chest dependin

## 2020-01-17 NOTE — TELEPHONE ENCOUNTER
I spoke with patient and he completed tamiflu in November. He has cough syrup left from November visit. His cough has been on and off since November. Cough has been worse over the past 5 days.  He is wheezing with cough and he has pain in his ribs when he c

## 2020-01-20 ENCOUNTER — OFFICE VISIT (OUTPATIENT)
Dept: PAIN CLINIC | Facility: HOSPITAL | Age: 55
End: 2020-01-20
Attending: NURSE PRACTITIONER
Payer: COMMERCIAL

## 2020-01-20 VITALS — SYSTOLIC BLOOD PRESSURE: 132 MMHG | HEART RATE: 77 BPM | DIASTOLIC BLOOD PRESSURE: 74 MMHG

## 2020-01-20 DIAGNOSIS — M51.36 DEGENERATIVE DISC DISEASE, LUMBAR: ICD-10-CM

## 2020-01-20 DIAGNOSIS — M48.062 SPINAL STENOSIS OF LUMBAR REGION WITH NEUROGENIC CLAUDICATION: ICD-10-CM

## 2020-01-20 DIAGNOSIS — M54.16 RIGHT LUMBAR RADICULOPATHY: Primary | ICD-10-CM

## 2020-01-20 DIAGNOSIS — F11.90 CHRONIC, CONTINUOUS USE OF OPIOIDS: ICD-10-CM

## 2020-01-20 DIAGNOSIS — M47.817 FACET ARTHRITIS OF LUMBOSACRAL REGION: ICD-10-CM

## 2020-01-20 PROCEDURE — 99211 OFF/OP EST MAY X REQ PHY/QHP: CPT

## 2020-01-20 RX ORDER — HYDROCODONE BITARTRATE AND ACETAMINOPHEN 10; 325 MG/1; MG/1
1 TABLET ORAL EVERY 6 HOURS PRN
COMMUNITY
End: 2020-03-30

## 2020-01-20 RX ORDER — HYDROCODONE BITARTRATE AND ACETAMINOPHEN 10; 325 MG/1; MG/1
1 TABLET ORAL EVERY 6 HOURS PRN
Qty: 120 TABLET | Refills: 0 | Status: SHIPPED | OUTPATIENT
Start: 2020-01-20 | End: 2020-02-19

## 2020-01-20 RX ORDER — HYDROCODONE BITARTRATE AND ACETAMINOPHEN 10; 325 MG/1; MG/1
1 TABLET ORAL EVERY 6 HOURS PRN
Qty: 120 TABLET | Refills: 0 | Status: SHIPPED | OUTPATIENT
Start: 2020-02-19 | End: 2020-03-20

## 2020-01-20 NOTE — PROGRESS NOTES
Patient presents to [facility] for med eval.  He has chronic right lower back pain that radiates to his right buttock and leg with n/t in his right foot. He takes 3-4 norco per day, sometimes he doesn't take it every day. RICHARDN Sukhjinder in to see patient.

## 2020-01-20 NOTE — CHRONIC PAIN
MEDICATION EVALUATION  HISTORY OF PRESENT ILLNESS:  Nida Garcia is a 48year old old male with history of Right lumbar radiculopathy  (primary encounter diagnosis)  Degenerative disc disease, lumbar  Chronic, continuous use of opioids  Facet arthritis o Subcutaneous Solution Pen-injector 40 units s.c in am      • Losartan Potassium 50 MG Oral Tab Take 50 mg by mouth once daily.   1   • BD PEN NEEDLE MINI U/F 31G X 5 MM Does not apply Misc USE AND DISCARD 1 PEN NEEDLE ONCE A DAY  0   • ACCU-CHEK MULTICLIX L CAD   • Other Father         Strokes, High BP, Diabetes Type 2, High Cholesterol   • Diabetes Mother    • Hypertension Mother    • Migraines Mother    • Lipids Mother    • Ovarian Cancer Mother    • Cancer Mother    • Other Mother         Diabetes Type 1, Diet: Not Asked        Back Care: Not Asked        Exercise: Not Asked        Bike Helmet: Not Asked        Seat Belt: Not Asked        Self-Exams: Not Asked    Social History Narrative      Not on file    ADVANCE CARE PLANNING:    Advance Care Plan NOT di of T11.  OTHER:             There has been straightening of the lumbar lordosis which could be secondary to positioning versus muscle spasm.  There is grade 1 anterolisthesis of L5 on S1.     LUMBAR DISC LEVELS:  L1-L2:   No significant disc/facet abnormali dependence, addiction, respiratory depression, and death.  Patient advised not to consume ETOH or operating heavy machinery or vehicles while taking this medication.  Patient verbalized understanding.  Informed patient that no refills will be given over th

## 2020-01-22 ENCOUNTER — TELEPHONE (OUTPATIENT)
Dept: INTERNAL MEDICINE CLINIC | Facility: CLINIC | Age: 55
End: 2020-01-22

## 2020-01-22 NOTE — TELEPHONE ENCOUNTER
Imp- acute influenza;  Rec- cont Tylenol prn, Mucinex prn    Letter for work signed; please FAX    Please call pt

## 2020-01-22 NOTE — TELEPHONE ENCOUNTER
Letter faxed to [facility] [phone] ATT Karina - confirmation received . Called patient and relayed DR. CHLOE xiao - verbalized understanding and informed that letter was faxed

## 2020-01-22 NOTE — TELEPHONE ENCOUNTER
Patient calling, need note for work. Went to to ER 1/18/2020 dx Influenza Type A Positive. No prescriptions given to patient from ER. Coughing, cant keep food down, and diarrhea for three days. Muscnex is helping a little. Coughing up green phlegm.

## 2020-01-25 ENCOUNTER — HOSPITAL ENCOUNTER (EMERGENCY)
Facility: HOSPITAL | Age: 55
Discharge: HOME | End: 2020-01-25
Attending: EMERGENCY MEDICINE
Payer: COMMERCIAL

## 2020-01-25 ENCOUNTER — APPOINTMENT (OUTPATIENT)
Dept: GENERAL RADIOLOGY | Facility: HOSPITAL | Age: 55
End: 2020-01-25
Attending: EMERGENCY MEDICINE
Payer: COMMERCIAL

## 2020-01-25 VITALS
HEART RATE: 88 BPM | SYSTOLIC BLOOD PRESSURE: 148 MMHG | RESPIRATION RATE: 18 BRPM | WEIGHT: 233 LBS | OXYGEN SATURATION: 94 % | DIASTOLIC BLOOD PRESSURE: 70 MMHG | TEMPERATURE: 98 F | BODY MASS INDEX: 37.45 KG/M2 | HEIGHT: 66 IN

## 2020-01-25 DIAGNOSIS — J11.1 INFLUENZA: Primary | ICD-10-CM

## 2020-01-25 PROCEDURE — 71046 X-RAY EXAM CHEST 2 VIEWS: CPT | Performed by: EMERGENCY MEDICINE

## 2020-01-25 PROCEDURE — 99283 EMERGENCY DEPT VISIT LOW MDM: CPT

## 2020-01-25 PROCEDURE — 94640 AIRWAY INHALATION TREATMENT: CPT

## 2020-01-25 RX ORDER — BENZONATATE 100 MG/1
100 CAPSULE ORAL 3 TIMES DAILY PRN
Qty: 30 CAPSULE | Refills: 0 | Status: SHIPPED | OUTPATIENT
Start: 2020-01-25 | End: 2020-02-24

## 2020-01-25 RX ORDER — ALBUTEROL SULFATE 90 UG/1
2 AEROSOL, METERED RESPIRATORY (INHALATION) EVERY 4 HOURS PRN
Qty: 1 INHALER | Refills: 0 | Status: SHIPPED | OUTPATIENT
Start: 2020-01-25 | End: 2020-02-24

## 2020-01-25 RX ORDER — IPRATROPIUM BROMIDE AND ALBUTEROL SULFATE 2.5; .5 MG/3ML; MG/3ML
3 SOLUTION RESPIRATORY (INHALATION) ONCE
Status: COMPLETED | OUTPATIENT
Start: 2020-01-25 | End: 2020-01-25

## 2020-01-25 NOTE — ED INITIAL ASSESSMENT (HPI)
Pt here with 3 days of sob, states he was dx with influenza A by [facility]. Adds cough/fever. Also states that his flu s/s have been lingering and his cough worsens with lying down.

## 2020-01-25 NOTE — ED PROVIDER NOTES
Patient Seen in: [facility] Department      History   Patient presents with:  Cough/URI    Stated Complaint: uri symptoms    HPI    47 yo male diagnosed with influenza A on 1/19 at an outside hospital. He is concerned that he has persist and reactive to light. Cardiovascular:      Rate and Rhythm: Normal rate and regular rhythm. Pulmonary:      Effort: Pulmonary effort is normal. No respiratory distress. Comments: Decreased air exchange  Musculoskeletal: Normal range of motion.

## 2020-03-09 NOTE — TELEPHONE ENCOUNTER
\"Urgent\" request for DM med orders faxed again to Dr. Katie Espinoza office at [phone], fax confirmation received 5/3/18 @ 0800.    -Awaiting orders at this time. [phone]

## 2020-03-30 ENCOUNTER — TELEPHONE (OUTPATIENT)
Dept: PAIN CLINIC | Facility: HOSPITAL | Age: 55
End: 2020-03-30

## 2020-03-30 DIAGNOSIS — M51.36 DEGENERATIVE DISC DISEASE, LUMBAR: ICD-10-CM

## 2020-03-30 DIAGNOSIS — F11.90 CHRONIC, CONTINUOUS USE OF OPIOIDS: ICD-10-CM

## 2020-03-30 DIAGNOSIS — M54.16 RIGHT LUMBAR RADICULOPATHY: Primary | ICD-10-CM

## 2020-03-30 DIAGNOSIS — M48.062 SPINAL STENOSIS OF LUMBAR REGION WITH NEUROGENIC CLAUDICATION: ICD-10-CM

## 2020-03-30 RX ORDER — HYDROCODONE BITARTRATE AND ACETAMINOPHEN 10; 325 MG/1; MG/1
1 TABLET ORAL EVERY 6 HOURS PRN
Qty: 120 TABLET | Refills: 0 | Status: SHIPPED | OUTPATIENT
Start: 2020-03-30 | End: 2020-04-28

## 2020-03-30 NOTE — TELEPHONE ENCOUNTER
Virtual/Telephone Check-In    Mynor Cardenas verbally consents to a Virtual/Telephone Check-In service on 03/30/20. Patient understands and accepts financial responsibility for any deductible, co-insurance and/or co-pays associated with this service.     D a week.      • CYCLOBENZAPRINE HCL 10 MG Oral Tab TAKE 1 TABLET BY MOUTH 3 TIMES A DAY AS NEEDED FOR MUSCLE SPASMS 42 tablet 2   • Insulin Degludec (TRESIBA FLEXTOUCH) 200 UNIT/ML Subcutaneous Solution Pen-injector 40 units s.c in am      • Losartan Harvinder Father    • Lipids Father    • Heart Disease Father         CAD   • Other Father         Strokes, High BP, Diabetes Type 2, High Cholesterol   • Diabetes Mother    • Hypertension Mother    • Migraines Mother    • Lipids Mother    • Ovarian Cancer Mother Not Asked        Weight Concern: Not Asked        Special Diet: Not Asked        Back Care: Not Asked        Exercise: Not Asked        Bike Helmet: Not Asked        Seat Belt: Not Asked        Self-Exams: Not Asked    Social History Narrative      Not on

## 2020-06-03 ENCOUNTER — OFFICE VISIT (OUTPATIENT)
Dept: PAIN CLINIC | Facility: HOSPITAL | Age: 55
End: 2020-06-03
Attending: ANESTHESIOLOGY
Payer: COMMERCIAL

## 2020-06-03 DIAGNOSIS — F11.90 CHRONIC NARCOTIC USE: ICD-10-CM

## 2020-06-03 DIAGNOSIS — M51.36 DEGENERATIVE DISC DISEASE, LUMBAR: Primary | ICD-10-CM

## 2020-06-03 DIAGNOSIS — M54.16 RIGHT LUMBAR RADICULOPATHY: ICD-10-CM

## 2020-06-03 PROCEDURE — 99211 OFF/OP EST MAY X REQ PHY/QHP: CPT

## 2020-06-03 RX ORDER — HYDROCODONE BITARTRATE AND ACETAMINOPHEN 10; 325 MG/1; MG/1
1 TABLET ORAL EVERY 4 HOURS PRN
Qty: 150 TABLET | Refills: 0 | Status: SHIPPED | OUTPATIENT
Start: 2020-06-03 | End: 2020-07-03

## 2020-06-03 RX ORDER — HYDROCODONE BITARTRATE AND ACETAMINOPHEN 10; 325 MG/1; MG/1
1 TABLET ORAL EVERY 4 HOURS PRN
Qty: 150 TABLET | Refills: 0 | Status: SHIPPED | OUTPATIENT
Start: 2020-07-04 | End: 2020-07-27

## 2020-06-03 NOTE — PROGRESS NOTES
Pt presents to [facility] ambulatory for medication follow up. 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 Pt is currently taking Norco 10/325mg q 6 h. Dr. Fernando Farias to see pt see notes for POC.

## 2020-06-03 NOTE — CHRONIC PAIN
MEDICATION EVALUATION  HISTORY OF PRESENT ILLNESS:  Emmanuel Alonso is a 48year old old male with history of No diagnosis found. who returns for medication evaluation.  He reports worsening low back pain and right leg pain last week when he was changing out Calcium (LIPITOR) 20 MG Oral Tab Take 20 mg by mouth once daily. 1   • GlyBURIDE-MetFORMIN (GLUCOVANCE) 2.5-500 MG Oral Tab Take 1 tablet by mouth 2 (two) times daily.   1        REVIEW OF SYSTEMS:   Bowel/Bladder Incontinence: as above  Coughing/sneezing/ Socioeconomic History      Marital status:       Spouse name: Not on file      Number of children: Not on file      Years of education: Not on file      Highest education level: Not on file    Occupational History      Not on file    Social Needs visit.    General: Alert and oriented x3, NAD, appears stated age, appropriate disposition and demeanor, answers questions appropriately   Head: normocephalic, atraumatic  Eyes: anicteric; no injection  Ears: no obvious deformities noted   Nose: externally disc/facet abnormality, spinal stenosis, or foraminal stenosis. L3-L4:   No significant disc/facet abnormality, spinal stenosis, or foraminal stenosis.     L4-L5:   Disk desiccation with bulging disk, facet, and ligamentous hypertrophy results in mild ce patient. Prescriptions were electronically sent to pharmacy. Pt will return to clinic for follow up two weeks following the injection     Comprehensive analgesic plan was formulated.  Conservative vs. Aggressive measures were discussed at length inclu

## 2020-07-21 ENCOUNTER — LAB ENCOUNTER (OUTPATIENT)
Dept: LAB | Facility: HOSPITAL | Age: 55
End: 2020-07-21
Attending: INTERNAL MEDICINE
Payer: COMMERCIAL

## 2020-07-21 ENCOUNTER — VIRTUAL PHONE E/M (OUTPATIENT)
Dept: INTERNAL MEDICINE CLINIC | Facility: CLINIC | Age: 55
End: 2020-07-21
Payer: COMMERCIAL

## 2020-07-21 ENCOUNTER — TELEPHONE (OUTPATIENT)
Dept: INTERNAL MEDICINE CLINIC | Facility: CLINIC | Age: 55
End: 2020-07-21

## 2020-07-21 DIAGNOSIS — R05.9 COUGH: ICD-10-CM

## 2020-07-21 DIAGNOSIS — R06.02 SOB (SHORTNESS OF BREATH): ICD-10-CM

## 2020-07-21 DIAGNOSIS — R50.9 FEVER, UNSPECIFIED FEVER CAUSE: Primary | ICD-10-CM

## 2020-07-21 DIAGNOSIS — R50.9 FEVER, UNSPECIFIED FEVER CAUSE: ICD-10-CM

## 2020-07-21 LAB — SARS-COV-2 RNA RESP QL NAA+PROBE: NOT DETECTED

## 2020-07-21 PROCEDURE — 99214 OFFICE O/P EST MOD 30 MIN: CPT | Performed by: INTERNAL MEDICINE

## 2020-07-21 RX ORDER — AZITHROMYCIN 250 MG/1
TABLET, FILM COATED ORAL
Qty: 6 TABLET | Refills: 0 | Status: SHIPPED | OUTPATIENT
Start: 2020-07-21 | End: 2020-07-26

## 2020-07-21 RX ORDER — BENZONATATE 200 MG/1
200 CAPSULE ORAL 3 TIMES DAILY PRN
Qty: 40 CAPSULE | Refills: 0 | Status: SHIPPED | OUTPATIENT
Start: 2020-07-21

## 2020-07-21 NOTE — PROGRESS NOTES
HPI:    Patient ID: Ori Sandoval is a 48year old male.     HPI    Review of Systems         Current Outpatient Medications   Medication Sig Dispense Refill   • azithromycin (ZITHROMAX Z-JEAN) 250 MG Oral Tab Take 2 tablets (500 mg total) by mouth daily fo (200 mg total) by mouth 3 (three) times daily as needed for cough.        Imaging & Referrals:  None       #[id number]

## 2020-07-21 NOTE — TELEPHONE ENCOUNTER
Needs to be COVID tested, and consider going on a Z-Santiago and cough suppressant.   Lets try to see if he can virtual visit with me in the next few hours, double book my next appointment if needed

## 2020-07-21 NOTE — TELEPHONE ENCOUNTER
To Dr. Britney Suresh-----    Respiratory infection triage:    Fever:  []  No fever  [x]  Fever>100.4 (101 this AM)     Cough:  [] Tight cough  [] Cough with exertion  [x] Dry cough  [] Sputum production, Color:     Breathing:  [] Mild shortness of breath interfering w

## 2020-07-21 NOTE — PROGRESS NOTES
Virtual Visit/Telephone Note    [facility] verbally consents to a Virtual/Telephone Check-In service on 04/07/20. Patient understands and accepts financial responsibility for any deductible, co-insurance and/or co-pays associated with this service. SARS-COV-2 BY PCR ();  Future    4.diabetes mellitus type 2: Continue current monitoring management    Jarek Alberts DO  7/21/2020  12:04 PM

## 2020-07-21 NOTE — TELEPHONE ENCOUNTER
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

## 2020-07-24 ENCOUNTER — TELEPHONE (OUTPATIENT)
Dept: INTERNAL MEDICINE CLINIC | Facility: CLINIC | Age: 55
End: 2020-07-24

## 2020-07-24 NOTE — TELEPHONE ENCOUNTER
Dr. Luis Villalta message after virtual visit 7/21:  \"Nursing, lets follow-up with him on Thursday or Friday, may be a video visit with Dr. Lakhwinder Ramirez \"     Spoke with patient who reports he is feeling much better since his virtual visit.  His symptoms actually started

## 2020-07-27 NOTE — TELEPHONE ENCOUNTER
LM on patient's cell (OK per HIPPA) relaying MD message. Advised in VM to call back with any questions/symptoms/concerns.

## 2020-07-30 ENCOUNTER — OFFICE VISIT (OUTPATIENT)
Dept: PAIN CLINIC | Facility: HOSPITAL | Age: 55
End: 2020-07-30
Attending: NURSE PRACTITIONER
Payer: COMMERCIAL

## 2020-07-30 VITALS — HEART RATE: 83 BPM | SYSTOLIC BLOOD PRESSURE: 136 MMHG | DIASTOLIC BLOOD PRESSURE: 78 MMHG

## 2020-07-30 DIAGNOSIS — M48.062 SPINAL STENOSIS OF LUMBAR REGION WITH NEUROGENIC CLAUDICATION: ICD-10-CM

## 2020-07-30 DIAGNOSIS — M51.36 DEGENERATIVE DISC DISEASE, LUMBAR: Primary | ICD-10-CM

## 2020-07-30 DIAGNOSIS — F11.90 CHRONIC, CONTINUOUS USE OF OPIOIDS: ICD-10-CM

## 2020-07-30 DIAGNOSIS — M54.16 RIGHT LUMBAR RADICULOPATHY: ICD-10-CM

## 2020-07-30 PROCEDURE — 99211 OFF/OP EST MAY X REQ PHY/QHP: CPT

## 2020-07-30 RX ORDER — HYDROCODONE BITARTRATE AND ACETAMINOPHEN 10; 325 MG/1; MG/1
1 TABLET ORAL EVERY 4 HOURS PRN
Qty: 150 TABLET | Refills: 0 | Status: SHIPPED | OUTPATIENT
Start: 2020-09-01 | End: 2020-09-28

## 2020-07-30 RX ORDER — HYDROCODONE BITARTRATE AND ACETAMINOPHEN 10; 325 MG/1; MG/1
1 TABLET ORAL EVERY 4 HOURS PRN
Qty: 150 TABLET | Refills: 0 | Status: SHIPPED | OUTPATIENT
Start: 2020-08-02 | End: 2020-09-01

## 2020-07-30 NOTE — PROGRESS NOTES
Patient presents to [facility] ambulatory for med eval.  He has chronic low back pain. 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 He wears a back brace while working. He takes norco up to 4-5 per day which helps.   DANIEL Slaughter in to see patient

## 2020-07-30 NOTE — CHRONIC PAIN
MEDICATION EVALUATION  HISTORY OF PRESENT ILLNESS:  Maximiliano Stephens is a 48year old old male with history of Degenerative disc disease, lumbar  (primary encounter diagnosis)  Right lumbar radiculopathy  Chronic, continuous use of opioids  Spinal stenosis o DISCARD 1 PEN NEEDLE ONCE A DAY  0   • ACCU-CHEK MULTICLIX LANCETS Does not apply Misc 1 each by Other route. • Atorvastatin Calcium (LIPITOR) 20 MG Oral Tab Take 20 mg by mouth once daily.   1   • GlyBURIDE-MetFORMIN (GLUCOVANCE) 2.5-500 MG Oral Tab Ta • Cancer Mother    • Other Mother         Diabetes Type 1, High BP, High Cholesterol       SOCIAL HISTORY:  Social History    Socioeconomic History      Marital status:       Spouse name: Not on file      Number of children: Not on file      Years on file    ADVANCE CARE PLANNING:    Advance Care Plan NOT discussed.   PHYSICAL EXAMINATION:   07/30/20  0959   BP: 136/78   Pulse: 83      General: Alert and oriented x3, NAD, appears stated age, appropriate disposition and demeanor, answers questions mercy LUMBAR DISC LEVELS:  L1-L2:   No significant disc/facet abnormality, spinal stenosis, or foraminal stenosis. L2-L3:   No significant disc/facet abnormality, spinal stenosis, or foraminal stenosis.     L3-L4:   No significant disc/facet abnormality, spi with patient. Prescriptions were electronically sent to pharmacy. Pt will return to clinic for follow up after EMG     Comprehensive analgesic plan was formulated.  Conservative vs. Aggressive measures were discussed at length including pharmacotherap

## 2020-10-01 ENCOUNTER — OFFICE VISIT (OUTPATIENT)
Dept: PAIN CLINIC | Facility: HOSPITAL | Age: 55
End: 2020-10-01
Attending: NURSE PRACTITIONER
Payer: COMMERCIAL

## 2020-10-01 DIAGNOSIS — M54.16 RIGHT LUMBAR RADICULOPATHY: ICD-10-CM

## 2020-10-01 DIAGNOSIS — Z51.81 ENCOUNTER FOR MONITORING OPIOID MAINTENANCE THERAPY: Primary | ICD-10-CM

## 2020-10-01 DIAGNOSIS — M51.36 DEGENERATIVE DISC DISEASE, LUMBAR: ICD-10-CM

## 2020-10-01 DIAGNOSIS — Z79.891 ENCOUNTER FOR MONITORING OPIOID MAINTENANCE THERAPY: Primary | ICD-10-CM

## 2020-10-01 DIAGNOSIS — F11.90 CHRONIC, CONTINUOUS USE OF OPIOIDS: ICD-10-CM

## 2020-10-01 DIAGNOSIS — M48.062 SPINAL STENOSIS OF LUMBAR REGION WITH NEUROGENIC CLAUDICATION: ICD-10-CM

## 2020-10-01 PROCEDURE — 99212 OFFICE O/P EST SF 10 MIN: CPT

## 2020-10-01 PROCEDURE — 80307 DRUG TEST PRSMV CHEM ANLYZR: CPT | Performed by: NURSE PRACTITIONER

## 2020-10-01 RX ORDER — HYDROCODONE BITARTRATE AND ACETAMINOPHEN 10; 325 MG/1; MG/1
1 TABLET ORAL EVERY 6 HOURS PRN
Qty: 150 TABLET | Refills: 0 | Status: SHIPPED | OUTPATIENT
Start: 2020-10-03 | End: 2020-11-02

## 2020-10-01 RX ORDER — HYDROCODONE BITARTRATE AND ACETAMINOPHEN 10; 325 MG/1; MG/1
1 TABLET ORAL EVERY 4 HOURS PRN
Qty: 150 TABLET | Refills: 0 | Status: SHIPPED | OUTPATIENT
Start: 2020-10-02 | End: 2020-11-01

## 2020-10-01 NOTE — CHRONIC PAIN
MEDICATION EVALUATION  HISTORY OF PRESENT ILLNESS:  Maximiliano Stephens is a 48year old old male with history of Encounter for monitoring opioid maintenance therapy  (primary encounter diagnosis)  Degenerative disc disease, lumbar  Right lumbar radiculopathy 0   • ACCU-CHEK MULTICLIX LANCETS Does not apply Misc 1 each by Other route. • Atorvastatin Calcium (LIPITOR) 20 MG Oral Tab Take 20 mg by mouth once daily.   1   • GlyBURIDE-MetFORMIN (GLUCOVANCE) 2.5-500 MG Oral Tab Take 1 tablet by mouth 2 (two) time Mother         Diabetes Type 1, High BP, High Cholesterol       SOCIAL HISTORY:  Social History    Socioeconomic History      Marital status:       Spouse name: Not on file      Number of children: Not on file      Years of education: Not on file Advance Care Plan NOT discussed. PHYSICAL EXAMINATION:  There were no vitals filed for this visit.    General: Alert and oriented x3, NAD, appears stated age, appropriate disposition and demeanor, answers questions appropriately   Head: normocephalic, at significant disc/facet abnormality, spinal stenosis, or foraminal stenosis. L2-L3:   No significant disc/facet abnormality, spinal stenosis, or foraminal stenosis.     L3-L4:   No significant disc/facet abnormality, spinal stenosis, or foraminal stenosis Prescriptions were electronically sent to pharmacy. Pt will return to clinic for follow up after EMG     Comprehensive analgesic plan was formulated. Conservative vs. Aggressive measures were discussed at length including pharmacotherapy (eg.  Anti- inf

## 2020-10-01 NOTE — PROGRESS NOTES
Pt presents to [facility] ambulatory for medication fill. 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 Pt states has been moving heavy furniture around.  Pt takes Norco 10/325mg 1-4 depending on the day,

## 2020-11-03 RX ORDER — HYDROCODONE BITARTRATE AND ACETAMINOPHEN 10; 325 MG/1; MG/1
TABLET ORAL
Qty: 150 TABLET | Refills: 0 | OUTPATIENT
Start: 2020-11-03

## 2020-11-05 ENCOUNTER — TELEPHONE (OUTPATIENT)
Dept: PAIN CLINIC | Facility: HOSPITAL | Age: 55
End: 2020-11-05

## 2020-11-05 RX ORDER — HYDROCODONE BITARTRATE AND ACETAMINOPHEN 10; 325 MG/1; MG/1
TABLET ORAL
Qty: 150 TABLET | Refills: 0 | OUTPATIENT
Start: 2020-11-05

## 2020-11-05 RX ORDER — HYDROCODONE BITARTRATE AND ACETAMINOPHEN 10; 325 MG/1; MG/1
1 TABLET ORAL EVERY 4 HOURS PRN
Qty: 150 TABLET | Refills: 0 | Status: SHIPPED | OUTPATIENT
Start: 2020-11-05 | End: 2020-12-05

## 2020-12-07 RX ORDER — HYDROCODONE BITARTRATE AND ACETAMINOPHEN 10; 325 MG/1; MG/1
1 TABLET ORAL EVERY 4 HOURS PRN
Qty: 150 TABLET | Refills: 0 | Status: CANCELLED | OUTPATIENT
Start: 2020-12-15 | End: 2021-01-14

## 2020-12-07 RX ORDER — HYDROCODONE BITARTRATE AND ACETAMINOPHEN 10; 325 MG/1; MG/1
1 TABLET ORAL EVERY 4 HOURS PRN
COMMUNITY
End: 2021-01-11

## 2020-12-07 RX ORDER — HYDROCODONE BITARTRATE AND ACETAMINOPHEN 10; 325 MG/1; MG/1
1 TABLET ORAL EVERY 4 HOURS PRN
COMMUNITY
End: 2020-12-07

## 2020-12-08 ENCOUNTER — TELEMEDICINE (OUTPATIENT)
Dept: PAIN CLINIC | Facility: HOSPITAL | Age: 55
End: 2020-12-08
Attending: NURSE PRACTITIONER
Payer: COMMERCIAL

## 2020-12-08 DIAGNOSIS — M54.16 RIGHT LUMBAR RADICULOPATHY: ICD-10-CM

## 2020-12-08 DIAGNOSIS — Z51.81 ENCOUNTER FOR MONITORING OPIOID MAINTENANCE THERAPY: Primary | ICD-10-CM

## 2020-12-08 DIAGNOSIS — M48.062 SPINAL STENOSIS OF LUMBAR REGION WITH NEUROGENIC CLAUDICATION: ICD-10-CM

## 2020-12-08 DIAGNOSIS — F11.90 CHRONIC, CONTINUOUS USE OF OPIOIDS: ICD-10-CM

## 2020-12-08 DIAGNOSIS — Z79.891 ENCOUNTER FOR MONITORING OPIOID MAINTENANCE THERAPY: Primary | ICD-10-CM

## 2020-12-08 DIAGNOSIS — M51.36 DEGENERATIVE DISC DISEASE, LUMBAR: ICD-10-CM

## 2020-12-08 RX ORDER — HYDROCODONE BITARTRATE AND ACETAMINOPHEN 10; 325 MG/1; MG/1
1 TABLET ORAL EVERY 4 HOURS PRN
Qty: 150 TABLET | Refills: 0 | Status: SHIPPED | OUTPATIENT
Start: 2020-12-08 | End: 2021-01-07

## 2020-12-08 NOTE — CHRONIC PAIN
Virtual Video Check-In    Rima Sheikh verbally consents to a Virtual/Telephone Check-In visit on 12/08/20. Patient has been referred to the [facility] website at [email or web address] to review the yearly Consent to Treat document.     Patient understand  MG Oral Tab Take 1 tablet by mouth every 4 (four) hours as needed for Pain (max 5/day). • benzonatate 200 MG Oral Cap Take 1 capsule (200 mg total) by mouth 3 (three) times daily as needed for cough.  40 capsule 0   • Dulaglutide (TRULICITY) 5.73 Hemorrhoids    • History of varicocele 2011    repair -  in [place]   • Hypercholesterolemia    • Hyperlipidemia    • Hypogonadism in male     testosterone level low, no supplement recommended   • Oligospermia    • Screening PSA (prostate specific antig file      Intimate partner violence        Fear of current or ex partner: Not on file        Emotionally abused: Not on file        Physically abused: Not on file        Forced sexual activity: Not on file    Other Topics      Concerns:        [address] Ser vertebral bodies. L4-L5 is at the level of the iliac crest.     FINDINGS:          PARASPINAL AREA:     Colonic diverticulosis is partially seen. BONES:             There are mild hypertrophic changes of the lower lumbar facet joints.  Small anterior endpl ddd rt radiculoathy. Patient has been managed with norco and injections . PLAN:   - again reinforced need to get EMGs done   - consider updating imaging if new area of pain worsens     - CPM with norco . MME/day= 50.   Discussed with patient the risks

## 2020-12-16 ENCOUNTER — TELEPHONE (OUTPATIENT)
Dept: INTERNAL MEDICINE CLINIC | Facility: CLINIC | Age: 55
End: 2020-12-16

## 2020-12-16 ENCOUNTER — PATIENT MESSAGE (OUTPATIENT)
Dept: INTERNAL MEDICINE CLINIC | Facility: CLINIC | Age: 55
End: 2020-12-16

## 2020-12-16 RX ORDER — AMOXICILLIN AND CLAVULANATE POTASSIUM 875; 125 MG/1; MG/1
1 TABLET, FILM COATED ORAL 2 TIMES DAILY
Qty: 20 TABLET | Refills: 0 | Status: SHIPPED | OUTPATIENT
Start: 2020-12-16 | End: 2020-12-26

## 2020-12-16 NOTE — TELEPHONE ENCOUNTER
From: Garland Valderrama  To: Wendy Montesinos MD  Sent: 12/16/2020 10:18 AM CST  Subject: Prescription Question    Good morning Dr Honorio Perez,  I hope this message finds you doing well! I tried calling several times, but never got through with the wait.  I'm em

## 2020-12-16 NOTE — TELEPHONE ENCOUNTER
Max Yeh  to Donavon Dance, MD          12/16/20 10:18 AM  Good morning Dr Adriana Merchant,  I hope this message finds you doing well! I tried calling several times, but never got through with the wait.  I'm emailing you because my wife was diagnosed Mo

## 2020-12-16 NOTE — TELEPHONE ENCOUNTER
Respiratory infection triage:    Fever:  [x]  No fever  []  Fever>100.4    Cough:  [] Tight cough  [] Cough with exertion  [x] Dry cough  [] Sputum production, Color:     Breathing:  [] Mild shortness of breath interfering with activity  [] Wheezing  [] Pa

## 2021-01-11 ENCOUNTER — OFFICE VISIT (OUTPATIENT)
Dept: PAIN CLINIC | Facility: HOSPITAL | Age: 56
End: 2021-01-11
Attending: NURSE PRACTITIONER
Payer: COMMERCIAL

## 2021-01-11 VITALS — DIASTOLIC BLOOD PRESSURE: 100 MMHG | SYSTOLIC BLOOD PRESSURE: 157 MMHG | OXYGEN SATURATION: 98 % | HEART RATE: 81 BPM

## 2021-01-11 DIAGNOSIS — M54.16 RIGHT LUMBAR RADICULOPATHY: ICD-10-CM

## 2021-01-11 DIAGNOSIS — Z51.81 ENCOUNTER FOR MONITORING OPIOID MAINTENANCE THERAPY: Primary | ICD-10-CM

## 2021-01-11 DIAGNOSIS — Z79.891 ENCOUNTER FOR MONITORING OPIOID MAINTENANCE THERAPY: Primary | ICD-10-CM

## 2021-01-11 DIAGNOSIS — M47.817 FACET ARTHRITIS OF LUMBOSACRAL REGION: ICD-10-CM

## 2021-01-11 DIAGNOSIS — M48.062 SPINAL STENOSIS OF LUMBAR REGION WITH NEUROGENIC CLAUDICATION: ICD-10-CM

## 2021-01-11 DIAGNOSIS — F11.90 CHRONIC, CONTINUOUS USE OF OPIOIDS: ICD-10-CM

## 2021-01-11 DIAGNOSIS — M51.36 DEGENERATIVE DISC DISEASE, LUMBAR: ICD-10-CM

## 2021-01-11 PROCEDURE — 99211 OFF/OP EST MAY X REQ PHY/QHP: CPT

## 2021-01-11 RX ORDER — HYDROCODONE BITARTRATE AND ACETAMINOPHEN 10; 325 MG/1; MG/1
1 TABLET ORAL EVERY 4 HOURS PRN
Qty: 150 TABLET | Refills: 0 | Status: SHIPPED | OUTPATIENT
Start: 2021-01-11 | End: 2021-02-10

## 2021-01-11 RX ORDER — HYDROCODONE BITARTRATE AND ACETAMINOPHEN 10; 325 MG/1; MG/1
1 TABLET ORAL EVERY 4 HOURS PRN
Qty: 150 TABLET | Refills: 0 | Status: SHIPPED | OUTPATIENT
Start: 2021-02-10 | End: 2021-03-12

## 2021-01-11 NOTE — PROGRESS NOTES
Pt presents ambulatory to the [facility]. PT states pain increases w/ activity at work. DANIEL Slaughter saw PT for 2 month med eval. See notes for POC.

## 2021-01-11 NOTE — CHRONIC PAIN
MEDICATION EVALUATION  HISTORY OF PRESENT ILLNESS:  Aleyda Ochoa is a 50year old old male with history of Encounter for monitoring opioid maintenance therapy  (primary encounter diagnosis)  Degenerative disc disease, lumbar  Right lumbar radiculopathy Solution Pen-injector 40 units s.c in am      • Losartan Potassium 50 MG Oral Tab Take 50 mg by mouth once daily.   1   • BD PEN NEEDLE MINI U/F 31G X 5 MM Does not apply Misc USE AND DISCARD 1 PEN NEEDLE ONCE A DAY  0   • ACCU-CHEK MULTICLIX LANCETS Does n Strokes, High BP, Diabetes Type 2, High Cholesterol   • Diabetes Mother    • Hypertension Mother    • Migraines Mother    • Lipids Mother    • Ovarian Cancer Mother    • Cancer Mother    • Other Mother         Diabetes Type 1, High BP, High Cholesterol Not Asked        Exercise: Not Asked        Bike Helmet: Not Asked        Seat Belt: Not Asked        Self-Exams: Not Asked    Social History Narrative      Not on file    ADVANCE CARE PLANNING:    Advance Care Plan NOT discussed.   PHYSICAL EXAMINATION: There has been straightening of the lumbar lordosis which could be secondary to positioning versus muscle spasm.  There is grade 1 anterolisthesis of L5 on S1.     LUMBAR DISC LEVELS:  L1-L2:   No significant disc/facet abnormality, spinal stenosis, or fora Discussed with patient the risks of opioid medications including but not limited to dependence, addiction, respiratory depression, and death.  Patient advised not to consume ETOH or operating heavy machinery or vehicles while taking this medication.  Rakesh

## 2021-01-15 ENCOUNTER — TELEPHONE (OUTPATIENT)
Dept: INTERNAL MEDICINE CLINIC | Facility: CLINIC | Age: 56
End: 2021-01-15

## 2021-01-15 NOTE — TELEPHONE ENCOUNTER
Pt called     he was exposed to Covid (again) by a coworker that has tested positive     Pt has symptoms - slight cough & achyness   Would like to be tested as he & his wife have other medical issues       Call back # [phone]

## 2021-01-15 NOTE — TELEPHONE ENCOUNTER
Respiratory infection triage:    Fever:  [x]  No fever  []  Fever>100.4    Cough:  [] Tight cough  [] Cough with exertion  [x] Dry cough  [] Sputum production, Color:   Breathing:  [x] Mild shortness of breath interfering with activity  [] Wheezing  [x] Pain with deep breathing  [x] Using inhaler    Other symptoms:  [] Sore throat  [x] Difficulty swallowing  [x] Nasal drainage/congestion  [] Sinus congestion/pressure  [] Ear pain  [x] Body aches  [] Poor appetite  [] Loss of sense of smell   [] Loss of sense of taste  []Conjunctivitis? [] Any recent travel? [x] Any sick contacts? was in contact with co- worker who tested positive 1/11 - wearing masks . [] Are you a healthcare worker? ADDITIONAL NOTES:    Called patient who was exposed to Covid , also besides above symptoms has sinus headache , Due to SOB and pain when vicki a deep breath RN instructed patient to go to Er for evaluation - he agreed F/U 1/18 as FYI to DR. CORONA        Notified patient that we will route this message to the doctor and see what their recommendations would be. In the meantime, if anything worsens, they were advised to call back or seek emergent evaluation.

## 2021-01-27 NOTE — TELEPHONE ENCOUNTER
Visterra sent to pt:    Alo Good,   Our office has been unsuccessful in reaching you via phone for a status update. We wanted to know how you are feeling? Also did you go to an ER after your discussion with our nursing staff on 1/15? Please keep us posted either via [employer] or by returning our call at [phone].    Thanks,   [facility] Clinical Team

## 2021-02-11 DIAGNOSIS — Z23 NEED FOR VACCINATION: ICD-10-CM

## 2021-03-31 ENCOUNTER — OFFICE VISIT (OUTPATIENT)
Dept: PAIN CLINIC | Facility: HOSPITAL | Age: 56
End: 2021-03-31
Attending: ANESTHESIOLOGY
Payer: COMMERCIAL

## 2021-03-31 VITALS — DIASTOLIC BLOOD PRESSURE: 91 MMHG | SYSTOLIC BLOOD PRESSURE: 129 MMHG | OXYGEN SATURATION: 98 % | HEART RATE: 98 BPM

## 2021-03-31 DIAGNOSIS — M51.36 DEGENERATIVE DISC DISEASE, LUMBAR: Primary | ICD-10-CM

## 2021-03-31 DIAGNOSIS — E08.42 DIABETIC POLYNEUROPATHY ASSOCIATED WITH DIABETES MELLITUS DUE TO UNDERLYING CONDITION (HCC): ICD-10-CM

## 2021-03-31 DIAGNOSIS — F11.90 CHRONIC, CONTINUOUS USE OF OPIOIDS: ICD-10-CM

## 2021-03-31 DIAGNOSIS — M54.16 RIGHT LUMBAR RADICULOPATHY: ICD-10-CM

## 2021-03-31 DIAGNOSIS — M47.817 FACET ARTHRITIS OF LUMBOSACRAL REGION: ICD-10-CM

## 2021-03-31 DIAGNOSIS — Z79.891 ENCOUNTER FOR MONITORING OPIOID MAINTENANCE THERAPY: ICD-10-CM

## 2021-03-31 DIAGNOSIS — Z51.81 ENCOUNTER FOR MONITORING OPIOID MAINTENANCE THERAPY: ICD-10-CM

## 2021-03-31 PROCEDURE — 99211 OFF/OP EST MAY X REQ PHY/QHP: CPT

## 2021-03-31 RX ORDER — HYDROCODONE BITARTRATE AND ACETAMINOPHEN 10; 325 MG/1; MG/1
1 TABLET ORAL EVERY 6 HOURS PRN
Qty: 100 TABLET | Refills: 0 | Status: SHIPPED | OUTPATIENT
Start: 2021-04-30 | End: 2021-05-30

## 2021-03-31 RX ORDER — HYDROCODONE BITARTRATE AND ACETAMINOPHEN 10; 325 MG/1; MG/1
1 TABLET ORAL EVERY 6 HOURS PRN
Qty: 100 TABLET | Refills: 0 | Status: SHIPPED | OUTPATIENT
Start: 2021-03-31 | End: 2021-04-30

## 2021-03-31 NOTE — CHRONIC PAIN
MEDICATION EVALUATION  HISTORY OF PRESENT ILLNESS:  Meredith Casey is a 50year old old male with history of Degenerative disc disease, lumbar  (primary encounter diagnosis)  Right lumbar radiculopathy  Chronic, continuous use of opioids  Encounter for mon Other route. • Atorvastatin Calcium (LIPITOR) 20 MG Oral Tab Take 20 mg by mouth once daily. 1   • GlyBURIDE-MetFORMIN (GLUCOVANCE) 2.5-500 MG Oral Tab Take 1 tablet by mouth 2 (two) times daily.   1        REVIEW OF SYSTEMS:   Bowel/Bladder Incontinen HISTORY:  Social History    Socioeconomic History      Marital status:       Spouse name: Not on file      Number of children: Not on file      Years of education: Not on file      Highest education level: Not on file    Occupational History      No Plan NOT discussed.   PHYSICAL EXAMINATION:   03/31/21  1157   BP: (!) 129/91   Pulse: 98      General: Alert and oriented x3, NAD, appears stated age, appropriate disposition and demeanor, answers questions appropriately   Head: normocephalic, atraumatic disc/facet abnormality, spinal stenosis, or foraminal stenosis. L2-L3:   No significant disc/facet abnormality, spinal stenosis, or foraminal stenosis. L3-L4:   No significant disc/facet abnormality, spinal stenosis, or foraminal stenosis.     L4-L5: understanding.  Informed patient that no refills will be given over the phone. Instructed patient he is responsible for medications and her refills.  Patient verbalized understanding.     -  UDS and NA updated at previous visit, will need to repeat UDS

## 2021-03-31 NOTE — PROGRESS NOTES
PT presents ambulatory to the [facility]. Pt reports increase pain with activity. Dr. Brooke Zuñiga saw PT for med eval.  See notes for POC.

## 2021-06-08 ENCOUNTER — OFFICE VISIT (OUTPATIENT)
Dept: PAIN CLINIC | Facility: HOSPITAL | Age: 56
End: 2021-06-08
Attending: INTERNAL MEDICINE
Payer: COMMERCIAL

## 2021-06-08 VITALS
DIASTOLIC BLOOD PRESSURE: 83 MMHG | SYSTOLIC BLOOD PRESSURE: 119 MMHG | RESPIRATION RATE: 14 BRPM | HEART RATE: 88 BPM | OXYGEN SATURATION: 99 %

## 2021-06-08 DIAGNOSIS — M54.16 RIGHT LUMBAR RADICULOPATHY: ICD-10-CM

## 2021-06-08 DIAGNOSIS — Z51.81 ENCOUNTER FOR MONITORING OPIOID MAINTENANCE THERAPY: ICD-10-CM

## 2021-06-08 DIAGNOSIS — M51.36 DEGENERATIVE DISC DISEASE, LUMBAR: Primary | ICD-10-CM

## 2021-06-08 DIAGNOSIS — Z79.891 ENCOUNTER FOR MONITORING OPIOID MAINTENANCE THERAPY: ICD-10-CM

## 2021-06-08 DIAGNOSIS — M48.062 SPINAL STENOSIS OF LUMBAR REGION WITH NEUROGENIC CLAUDICATION: ICD-10-CM

## 2021-06-08 DIAGNOSIS — F11.90 CHRONIC, CONTINUOUS USE OF OPIOIDS: ICD-10-CM

## 2021-06-08 DIAGNOSIS — E08.42 DIABETIC POLYNEUROPATHY ASSOCIATED WITH DIABETES MELLITUS DUE TO UNDERLYING CONDITION (HCC): ICD-10-CM

## 2021-06-08 DIAGNOSIS — M47.817 FACET ARTHRITIS OF LUMBOSACRAL REGION: ICD-10-CM

## 2021-06-08 PROCEDURE — 99211 OFF/OP EST MAY X REQ PHY/QHP: CPT

## 2021-06-08 RX ORDER — HYDROCODONE BITARTRATE AND ACETAMINOPHEN 10; 325 MG/1; MG/1
1 TABLET ORAL EVERY 6 HOURS PRN
Qty: 100 TABLET | Refills: 0 | Status: SHIPPED | OUTPATIENT
Start: 2021-06-08 | End: 2021-07-08

## 2021-06-08 NOTE — PROGRESS NOTES
Pt presents to [facility] ambulatory for medication eval. Pt states no changes since last office visit. Medication controls pain well. DANIEL lizarraga to see pt see notes for POC.

## 2021-06-08 NOTE — CHRONIC PAIN
MEDICATION EVALUATION  HISTORY OF PRESENT ILLNESS:  Rubén Hanson is a 50year old old male with history of Degenerative disc disease, lumbar  (primary encounter diagnosis)  Right lumbar radiculopathy  Chronic, continuous use of opioids  Encounter for mon not apply Misc 1 each by Other route. • Atorvastatin Calcium (LIPITOR) 20 MG Oral Tab Take 20 mg by mouth once daily. 1   • GlyBURIDE-MetFORMIN (GLUCOVANCE) 2.5-500 MG Oral Tab Take 1 tablet by mouth 2 (two) times daily.   1        REVIEW OF SYSTEMS: High Cholesterol       SOCIAL HISTORY:  Social History    Socioeconomic History      Marital status:       Spouse name: Not on file      Number of children: Not on file      Years of education: Not on file      Highest education level: Not on file CARE PLANNING:    Advance Care Plan NOT discussed.   PHYSICAL EXAMINATION:   06/08/21  0917   BP: 119/83   Pulse: 88   Resp: 14      General: Alert and oriented x3, NAD, appears stated age, appropriate disposition and demeanor, answers questions appropriate LEVELS:  L1-L2:   No significant disc/facet abnormality, spinal stenosis, or foraminal stenosis. L2-L3:   No significant disc/facet abnormality, spinal stenosis, or foraminal stenosis.     L3-L4:   No significant disc/facet abnormality, spinal stenosis, verbalized understanding.  Informed patient that no refills will be given over the phone. Instructed patient he is responsible for medications and her refills.  Patient verbalized understanding.     -  UDS and NA updated at previous visit, will need to repe

## 2021-06-30 ENCOUNTER — TELEPHONE (OUTPATIENT)
Dept: PAIN CLINIC | Facility: HOSPITAL | Age: 56
End: 2021-06-30

## 2021-06-30 NOTE — TELEPHONE ENCOUNTER
Rcv'd call from pt's wife inquiring on how pt can fill medication out of states. Per pt's wife, pt's dad passed away they will be traveling to [place] for the services and will also be in [place].  Janet Hu is requesting for pt to fill medication in [facility]

## 2021-07-23 RX ORDER — HYDROCODONE BITARTRATE AND ACETAMINOPHEN 10; 325 MG/1; MG/1
1 TABLET ORAL EVERY 4 HOURS PRN
Qty: 150 TABLET | Refills: 0 | Status: CANCELLED | OUTPATIENT
Start: 2021-09-03 | End: 2021-10-03

## 2021-08-04 ENCOUNTER — OFFICE VISIT (OUTPATIENT)
Dept: PAIN CLINIC | Facility: HOSPITAL | Age: 56
End: 2021-08-04
Attending: ANESTHESIOLOGY
Payer: COMMERCIAL

## 2021-08-04 VITALS — HEART RATE: 92 BPM | DIASTOLIC BLOOD PRESSURE: 97 MMHG | SYSTOLIC BLOOD PRESSURE: 150 MMHG | OXYGEN SATURATION: 98 %

## 2021-08-04 DIAGNOSIS — M48.062 SPINAL STENOSIS OF LUMBAR REGION WITH NEUROGENIC CLAUDICATION: ICD-10-CM

## 2021-08-04 DIAGNOSIS — M47.817 FACET ARTHRITIS OF LUMBOSACRAL REGION: ICD-10-CM

## 2021-08-04 DIAGNOSIS — Z79.891 ENCOUNTER FOR MONITORING OPIOID MAINTENANCE THERAPY: ICD-10-CM

## 2021-08-04 DIAGNOSIS — Z51.81 ENCOUNTER FOR MONITORING OPIOID MAINTENANCE THERAPY: ICD-10-CM

## 2021-08-04 DIAGNOSIS — M54.16 RIGHT LUMBAR RADICULOPATHY: Primary | ICD-10-CM

## 2021-08-04 DIAGNOSIS — F11.90 CHRONIC, CONTINUOUS USE OF OPIOIDS: ICD-10-CM

## 2021-08-04 DIAGNOSIS — E08.42 DIABETIC POLYNEUROPATHY ASSOCIATED WITH DIABETES MELLITUS DUE TO UNDERLYING CONDITION (HCC): ICD-10-CM

## 2021-08-04 DIAGNOSIS — M51.36 DEGENERATIVE DISC DISEASE, LUMBAR: ICD-10-CM

## 2021-08-04 PROCEDURE — 99211 OFF/OP EST MAY X REQ PHY/QHP: CPT

## 2021-08-04 RX ORDER — HYDROCODONE BITARTRATE AND ACETAMINOPHEN 10; 325 MG/1; MG/1
1 TABLET ORAL EVERY 4 HOURS PRN
Qty: 100 TABLET | Refills: 0 | Status: SHIPPED | OUTPATIENT
Start: 2021-08-04 | End: 2021-09-03

## 2021-08-04 NOTE — CHRONIC PAIN
MEDICATION EVALUATION  HISTORY OF PRESENT ILLNESS:  Cedric Davison is a 50year old old male with history of Right lumbar radiculopathy  (primary encounter diagnosis)  Degenerative disc disease, lumbar  Chronic, continuous use of opioids  Facet arthritis o • Atorvastatin Calcium (LIPITOR) 20 MG Oral Tab Take 20 mg by mouth once daily. 1   • GlyBURIDE-MetFORMIN (GLUCOVANCE) 2.5-500 MG Oral Tab Take 1 tablet by mouth 2 (two) times daily.   1        REVIEW OF SYSTEMS:   Bowel/Bladder Incontinence: as above  C Socioeconomic History      Marital status:       Spouse name: Not on file      Number of children: Not on file      Years of education: Not on file      Highest education level: Not on file    Occupational History      Not on file    Tobacco Use EXAMINATION:   08/04/21  1045   BP: (!) 150/97   Pulse: 92      General: Alert and oriented x3, NAD, appears stated age, appropriate disposition and demeanor, answers questions appropriately   Head: normocephalic, atraumatic  Eyes: anicteric; no injection stenosis, or foraminal stenosis. L2-L3:   No significant disc/facet abnormality, spinal stenosis, or foraminal stenosis. L3-L4:   No significant disc/facet abnormality, spinal stenosis, or foraminal stenosis.     L4-L5:   Disk desiccation with bulging Instructed patient he is responsible for medications and her refills.  Patient verbalized understanding.     -  UDS and NA updated at previous visit, will need to repeat UDS if continuing meds    Patients medication, dose, instructions, refill dates, and ph

## 2021-08-04 NOTE — PROGRESS NOTES
PT presents ambulatory to the [facility]. PT reports 9/10 pain last night. Dr Brooke Zuñiga PT for 2 month med eval.  Refer to dictation for POC.

## 2021-08-11 ENCOUNTER — PROCEDURE VISIT (OUTPATIENT)
Dept: NEUROLOGY | Facility: CLINIC | Age: 56
End: 2021-08-11
Payer: COMMERCIAL

## 2021-08-11 PROCEDURE — 95886 MUSC TEST DONE W/N TEST COMP: CPT | Performed by: OTHER

## 2021-08-11 PROCEDURE — 95910 NRV CNDJ TEST 7-8 STUDIES: CPT | Performed by: OTHER

## 2021-08-11 NOTE — PROCEDURES
HISTORY:    Rama Velazquez is a 50year old male with a complaint of numbness in the feet, especially in the right side.   But also severe back pain, traveling down his leg especially the anterior surface of the calf    PHYSICAL:    Cranial nerves grossly n 6.77 Ankle - EDB 8        Ref. ?6.20 ?2.0  Ref. Fib Head EDB 13.02 2.8 7.92 Fib Head - Ankle 29 8.85 33      Ref. Ref.   ?39      Knee EDB 15.68 2.8 7.92 Knee - Fib Head 10 2.66 38      Ref. Ref.   ?39   R Tibial - AH      Ankle AH 5.16 4.

## 2021-09-20 ENCOUNTER — OFFICE VISIT (OUTPATIENT)
Dept: PAIN CLINIC | Facility: HOSPITAL | Age: 56
End: 2021-09-20
Attending: ANESTHESIOLOGY
Payer: COMMERCIAL

## 2021-09-20 VITALS
SYSTOLIC BLOOD PRESSURE: 132 MMHG | HEIGHT: 67 IN | DIASTOLIC BLOOD PRESSURE: 78 MMHG | WEIGHT: 233 LBS | HEART RATE: 86 BPM | RESPIRATION RATE: 18 BRPM | BODY MASS INDEX: 36.57 KG/M2

## 2021-09-20 DIAGNOSIS — E08.42 DIABETIC POLYNEUROPATHY ASSOCIATED WITH DIABETES MELLITUS DUE TO UNDERLYING CONDITION (HCC): ICD-10-CM

## 2021-09-20 DIAGNOSIS — F11.90 CHRONIC, CONTINUOUS USE OF OPIOIDS: ICD-10-CM

## 2021-09-20 DIAGNOSIS — M51.36 DEGENERATIVE DISC DISEASE, LUMBAR: ICD-10-CM

## 2021-09-20 DIAGNOSIS — M54.16 RIGHT LUMBAR RADICULOPATHY: Primary | ICD-10-CM

## 2021-09-20 PROCEDURE — 99211 OFF/OP EST MAY X REQ PHY/QHP: CPT

## 2021-09-20 RX ORDER — GABAPENTIN 100 MG/1
100 CAPSULE ORAL 3 TIMES DAILY
Qty: 90 CAPSULE | Refills: 0 | Status: SHIPPED | OUTPATIENT
Start: 2021-09-20 | End: 2021-10-20

## 2021-09-20 RX ORDER — HYDROCODONE BITARTRATE AND ACETAMINOPHEN 10; 325 MG/1; MG/1
1 TABLET ORAL EVERY 6 HOURS PRN
Qty: 120 TABLET | Refills: 0 | Status: CANCELLED | OUTPATIENT
Start: 2021-09-20 | End: 2021-10-20

## 2021-09-20 RX ORDER — HYDROCODONE BITARTRATE AND ACETAMINOPHEN 10; 325 MG/1; MG/1
1 TABLET ORAL EVERY 6 HOURS PRN
Qty: 120 TABLET | Refills: 0 | Status: CANCELLED | OUTPATIENT
Start: 2021-10-20 | End: 2021-11-19

## 2021-09-20 RX ORDER — HYDROCODONE BITARTRATE AND ACETAMINOPHEN 10; 325 MG/1; MG/1
1 TABLET ORAL EVERY 6 HOURS PRN
COMMUNITY
End: 2021-09-20

## 2021-09-20 RX ORDER — HYDROCODONE BITARTRATE AND ACETAMINOPHEN 10; 325 MG/1; MG/1
1 TABLET ORAL 2 TIMES DAILY PRN
Qty: 60 TABLET | Refills: 0 | Status: SHIPPED | OUTPATIENT
Start: 2021-09-20 | End: 2021-10-20

## 2021-09-20 NOTE — CHRONIC PAIN
MEDICATION EVALUATION  HISTORY OF PRESENT ILLNESS:  Marta Clay is a 50year old old male with history of Right lumbar radiculopathy  (primary encounter diagnosis)  Degenerative disc disease, lumbar  Diabetic polyneuropathy associated with diabetes myra daily.  1   • BD PEN NEEDLE MINI U/F 31G X 5 MM Does not apply Misc USE AND DISCARD 1 PEN NEEDLE ONCE A DAY  0   • ACCU-CHEK MULTICLIX LANCETS Does not apply Misc 1 each by Other route.      • Atorvastatin Calcium (LIPITOR) 20 MG Oral Tab Take 20 mg by mout Mother    • Migraines Mother    • Lipids Mother    • Ovarian Cancer Mother    • Cancer Mother    • Other Mother         Diabetes Type 1, High BP, High Cholesterol       SOCIAL HISTORY:  Social History    Socioeconomic History      Marital status:  file      Active Member of Clubs or Organizations: Not on file      Attends Club or Organization Meetings: Not on file      Marital Status: Not on file  Intimate Partner Violence:       Fear of Current or Ex-Partner: Not on file      Emotionally Abused: No changes of the lower lumbar facet joints. Small anterior endplate osteophytes seen within the lumbar vertebral bodies. There is no acute fracture, dislocation or marrow replacing lesion.   CORD/CAUDA EQUINA:            Normal caliber, contour, and signal in throughout.      TECHNICAL SUMMARY:     There were no significant technical difficulty encountered during this study.   Nerve conduction studies were performed without warming with skin temperature between 32-33 degrees centigrade.     SUMMARY:     Asad 6.61 Ankle - AH 8          Ref.   ?6.00 ?3.0   Ref.            Knee AH 16.93 3.7 6.67 Knee - Ankle 40 11.77 34      Ref.         Ref.     ?39   L Tibial - AH      Ankle AH 5.63 5.1 5.73 Ankle - AH 8          Ref.   ?6.00 ?3.0   Ref.            Knee AH 17. 1 blood sugars  -Recommend initiation of Neurontin 100 mg 3 times daily  - I informed patient that we will not be maintaining these medications long term as the research shows the risks outweigh the benefits.   We will decrease Norco to 50 pills/month      Jeff Johnson

## 2021-09-21 ENCOUNTER — TELEPHONE (OUTPATIENT)
Dept: INTERNAL MEDICINE CLINIC | Facility: CLINIC | Age: 56
End: 2021-09-21

## 2021-09-21 NOTE — TELEPHONE ENCOUNTER
To Dr. Sindy Mendez - see below. Would you be willing to see pt? Denies fever/chills/sob  Pt c/o cough with phlegm yellow/brownish, some green yesterday. Pt home from work yesterday/today.   Advised UC but pt said he waited several hours to be seen at last UC v

## 2021-09-21 NOTE — TELEPHONE ENCOUNTER
Pt has a cough for 5 days, over the counter meds not helping, ,may need an antibiotic   Would see an associate today   Needs appt before 2:30 or after 4 pm   - had two negative covid tests, one on Friday thru [employer], 2nd test done at home today

## 2021-09-21 NOTE — TELEPHONE ENCOUNTER
Should be OK to see, to FD, let's make sure he has a copy of his negative COVID test when he checks in

## 2021-09-22 ENCOUNTER — HOSPITAL ENCOUNTER (OUTPATIENT)
Age: 56
Discharge: HOME | End: 2021-09-22
Payer: COMMERCIAL

## 2021-09-22 VITALS
OXYGEN SATURATION: 99 % | SYSTOLIC BLOOD PRESSURE: 135 MMHG | HEART RATE: 98 BPM | TEMPERATURE: 98 F | RESPIRATION RATE: 19 BRPM | DIASTOLIC BLOOD PRESSURE: 89 MMHG

## 2021-09-22 DIAGNOSIS — Z20.822 ENCOUNTER FOR LABORATORY TESTING FOR COVID-19 VIRUS: ICD-10-CM

## 2021-09-22 DIAGNOSIS — Z86.39 HISTORY OF DIABETES MELLITUS, TYPE II: ICD-10-CM

## 2021-09-22 DIAGNOSIS — Z86.79 HISTORY OF HYPERTENSION: ICD-10-CM

## 2021-09-22 DIAGNOSIS — J40 BRONCHITIS: Primary | ICD-10-CM

## 2021-09-22 LAB
S PYO AG THROAT QL: NEGATIVE
SARS-COV-2 RNA RESP QL NAA+PROBE: NOT DETECTED

## 2021-09-22 PROCEDURE — U0002 COVID-19 LAB TEST NON-CDC: HCPCS | Performed by: EMERGENCY MEDICINE

## 2021-09-22 PROCEDURE — 99213 OFFICE O/P EST LOW 20 MIN: CPT | Performed by: EMERGENCY MEDICINE

## 2021-09-22 PROCEDURE — 87880 STREP A ASSAY W/OPTIC: CPT | Performed by: EMERGENCY MEDICINE

## 2021-09-22 PROCEDURE — 94640 AIRWAY INHALATION TREATMENT: CPT | Performed by: EMERGENCY MEDICINE

## 2021-09-22 RX ORDER — ALBUTEROL SULFATE 90 UG/1
2 AEROSOL, METERED RESPIRATORY (INHALATION) ONCE
Status: COMPLETED | OUTPATIENT
Start: 2021-09-22 | End: 2021-09-22

## 2021-09-22 RX ORDER — ALBUTEROL SULFATE 90 UG/1
AEROSOL, METERED RESPIRATORY (INHALATION)
Qty: 1 EACH | Refills: 0 | Status: SHIPPED | OUTPATIENT
Start: 2021-09-22 | End: 2021-09-22

## 2021-09-22 NOTE — ED INITIAL ASSESSMENT (HPI)
Pt states Monday began having sore throat and cough that has progressively become worse. Pt low grade fevers on and off.

## 2021-09-24 NOTE — ED PROVIDER NOTES
Patient Seen in: [facility]      History   Patient presents with:  Cough/URI  Sore Throat    Stated Complaint: SORE THROAT BODYACHE COUGH    Subjective:   HPI  Garland Valderrama is a 50year old male here for sore throat, cough, and low-grade f Pulmonary:      Effort: Pulmonary effort is normal. No respiratory distress. Breath sounds: No stridor. Wheezing present. Chest:      Chest wall: No tenderness. Musculoskeletal:      Cervical back: Normal range of motion.    Skin:     Capillary R allegra, zyrtec, benadryl, or Xyzal as long as there is no contraindication such as acute angle glaucoma, glaucoma, urinary retention, allergy, etc. Benadryl is in night time meds like Nyquil.   - Antibiotics do not treat viruses.   - IF/Once you test posi

## 2021-10-19 ENCOUNTER — TELEPHONE (OUTPATIENT)
Dept: PAIN CLINIC | Facility: HOSPITAL | Age: 56
End: 2021-10-19

## 2021-10-21 ENCOUNTER — TELEPHONE (OUTPATIENT)
Dept: PAIN CLINIC | Facility: HOSPITAL | Age: 56
End: 2021-10-21

## 2022-01-03 ENCOUNTER — TELEPHONE (OUTPATIENT)
Dept: PAIN CLINIC | Facility: HOSPITAL | Age: 57
End: 2022-01-03

## 2022-01-26 ENCOUNTER — OFFICE VISIT (OUTPATIENT)
Dept: PAIN CLINIC | Facility: HOSPITAL | Age: 57
End: 2022-01-26
Attending: ANESTHESIOLOGY
Payer: COMMERCIAL

## 2022-01-26 VITALS
WEIGHT: 233 LBS | SYSTOLIC BLOOD PRESSURE: 141 MMHG | HEIGHT: 67 IN | BODY MASS INDEX: 36.57 KG/M2 | DIASTOLIC BLOOD PRESSURE: 89 MMHG | HEART RATE: 80 BPM | RESPIRATION RATE: 18 BRPM

## 2022-01-26 DIAGNOSIS — M48.061 SPINAL STENOSIS OF LUMBAR REGION, UNSPECIFIED WHETHER NEUROGENIC CLAUDICATION PRESENT: ICD-10-CM

## 2022-01-26 DIAGNOSIS — M51.36 DEGENERATIVE DISC DISEASE, LUMBAR: ICD-10-CM

## 2022-01-26 DIAGNOSIS — M54.16 RIGHT LUMBAR RADICULOPATHY: Primary | ICD-10-CM

## 2022-01-26 DIAGNOSIS — M43.17 SPONDYLOLISTHESIS AT L5-S1 LEVEL: ICD-10-CM

## 2022-01-26 PROCEDURE — 99211 OFF/OP EST MAY X REQ PHY/QHP: CPT

## 2022-01-26 RX ORDER — GABAPENTIN 100 MG/1
100 CAPSULE ORAL 3 TIMES DAILY
COMMUNITY
End: 2022-01-26

## 2022-01-26 RX ORDER — GABAPENTIN 100 MG/1
100 CAPSULE ORAL 3 TIMES DAILY
Qty: 90 CAPSULE | Refills: 0 | Status: SHIPPED | OUTPATIENT
Start: 2022-01-26 | End: 2022-02-25

## 2022-01-26 NOTE — PROGRESS NOTES
1/26/2022-returns to [facility] to re establish care; pt states 3 wks of increased RLBP RADIATING DOWN INTO THE RT BUTTOCK/HIP; rates his pain 9/10; pt states the cold weather hadn't helped ; he also works for [employer] and been working getting [employer]

## 2022-01-26 NOTE — CHRONIC PAIN
MEDICATION EVALUATION  HISTORY OF PRESENT ILLNESS:  Taya Lee is a 49year old old male with history of Right lumbar radiculopathy  (primary encounter diagnosis)  Degenerative disc disease, lumbar  Spinal stenosis of lumbar region, unspecified whether NEEDLE ONCE A DAY (Patient not taking: Reported on 9/22/2021)  0   • ACCU-CHEK MULTICLIX LANCETS Does not apply Misc 1 each by Other route.  (Patient not taking: Reported on 9/22/2021)     • Atorvastatin Calcium (LIPITOR) 20 MG Oral Tab Take 20 mg by mouth Mother    • Migraines Mother    • Lipids Mother    • Ovarian Cancer Mother    • Cancer Mother    • Other Mother         Diabetes Type 1, High BP, High Cholesterol       SOCIAL HISTORY:  Social History    Socioeconomic History      Marital status:  Throat: lips grossly within normal limits by visual exam externally  Neck: supple, trachea midline, no obvious JVD  Chest:  no obvious visual deformity  Gait: Normal;  cane user - No  Spine: Normal   Lumbar ROM intact   Some right LBP with facet loading bilateral neural foraminal narrowing. Small 0.7 cm ganglion cyst associated left facet joint without mass effect.   L5-S1:   Disk desiccation with bulging disk, facet, and ligamentous hypertrophy results in mild central canal narrowing and moderate bilatera lower lumbar paraspinal muscles.   With some increased duration polyphasia of motor unit action potentials  Bilateral vastus medialis, AT, MG, FDI (foot), short head of biceps femoris, as well as right lower paraspinal muscles were tested.     Motor NCS       CONCLUSION:     There is electrodiagnostic evidence of a primarily axonal length dependent symmetric polyneuropathy.     Additionally there is electrodiagnostic evidence of acute on chronic L4-L5 radiculopathy on the right side.     [facility] pain, therapy, and treatment options), face to face time, time spent reviewing data, obtaining patient information and discussing the care with the patients health care providers.

## 2022-01-27 ENCOUNTER — DOCUMENTATION ONLY (OUTPATIENT)
Dept: PAIN CLINIC | Facility: HOSPITAL | Age: 57
End: 2022-01-27

## 2022-01-27 NOTE — PROGRESS NOTES
Procedure code [address]--- called at 10:26am at # [phone]  Per automated system no PA needed   Ref # [id number]

## 2022-02-03 ENCOUNTER — DOCUMENTATION ONLY (OUTPATIENT)
Dept: PAIN CLINIC | Facility: HOSPITAL | Age: 57
End: 2022-02-03

## 2022-03-08 ENCOUNTER — TELEPHONE (OUTPATIENT)
Dept: INTERNAL MEDICINE CLINIC | Facility: CLINIC | Age: 57
End: 2022-03-08

## 2022-03-16 ENCOUNTER — LAB ENCOUNTER (OUTPATIENT)
Dept: LAB | Age: 57
End: 2022-03-16
Attending: INTERNAL MEDICINE
Payer: COMMERCIAL

## 2022-03-16 LAB
ALBUMIN SERPL-MCNC: 3.6 G/DL (ref 3.4–5)
ALBUMIN/GLOB SERPL: 1 {RATIO} (ref 1–2)
ALP LIVER SERPL-CCNC: 88 U/L
ALT SERPL-CCNC: 23 U/L
ANION GAP SERPL CALC-SCNC: 9 MMOL/L (ref 0–18)
AST SERPL-CCNC: 15 U/L (ref 15–37)
BASOPHILS # BLD AUTO: 0.06 X10(3) UL (ref 0–0.2)
BASOPHILS NFR BLD AUTO: 0.8 %
BILIRUB SERPL-MCNC: 0.9 MG/DL (ref 0.1–2)
BUN BLD-MCNC: 11 MG/DL (ref 7–18)
BUN/CREAT SERPL: 13.8 (ref 10–20)
CALCIUM BLD-MCNC: 9.3 MG/DL (ref 8.5–10.1)
CHLORIDE SERPL-SCNC: 104 MMOL/L (ref 98–112)
CHOLEST SERPL-MCNC: 237 MG/DL (ref ?–200)
CO2 SERPL-SCNC: 26 MMOL/L (ref 21–32)
COMPLEXED PSA SERPL-MCNC: 0.7 NG/ML (ref ?–4)
CREAT BLD-MCNC: 0.8 MG/DL
CREAT UR-SCNC: 265 MG/DL
DEPRECATED RDW RBC AUTO: 38.4 FL (ref 35.1–46.3)
EOSINOPHIL # BLD AUTO: 0.06 X10(3) UL (ref 0–0.7)
EOSINOPHIL NFR BLD AUTO: 0.8 %
EST. AVERAGE GLUCOSE BLD GHB EST-MCNC: 289 MG/DL (ref 68–126)
FASTING PATIENT LIPID ANSWER: YES
FASTING STATUS PATIENT QL REPORTED: YES
GLOBULIN PLAS-MCNC: 3.7 G/DL (ref 2.8–4.4)
GLUCOSE BLD-MCNC: 151 MG/DL (ref 70–99)
HBA1C MFR BLD: 11.7 % (ref ?–5.7)
HCT VFR BLD AUTO: 47.7 %
HDLC SERPL-MCNC: 48 MG/DL (ref 40–59)
HGB BLD-MCNC: 15.8 G/DL
IMM GRANULOCYTES # BLD AUTO: 0.03 X10(3) UL (ref 0–1)
IMM GRANULOCYTES NFR BLD: 0.4 %
LDLC SERPL CALC-MCNC: 175 MG/DL (ref ?–100)
LYMPHOCYTES # BLD AUTO: 2 X10(3) UL (ref 1–4)
LYMPHOCYTES NFR BLD AUTO: 26.5 %
MCH RBC QN AUTO: 29.1 PG (ref 26–34)
MCHC RBC AUTO-ENTMCNC: 33.1 G/DL (ref 31–37)
MCV RBC AUTO: 87.8 FL
MICROALBUMIN UR-MCNC: 78.3 MG/DL
MICROALBUMIN/CREAT 24H UR-RTO: 295.5 UG/MG (ref ?–30)
MONOCYTES # BLD AUTO: 0.64 X10(3) UL (ref 0.1–1)
MONOCYTES NFR BLD AUTO: 8.5 %
NEUTROPHILS # BLD AUTO: 4.76 X10 (3) UL (ref 1.5–7.7)
NEUTROPHILS # BLD AUTO: 4.76 X10(3) UL (ref 1.5–7.7)
NEUTROPHILS NFR BLD AUTO: 63 %
NONHDLC SERPL-MCNC: 189 MG/DL (ref ?–130)
OSMOLALITY SERPL CALC.SUM OF ELEC: 290 MOSM/KG (ref 275–295)
PLATELET # BLD AUTO: 249 10(3)UL (ref 150–450)
POTASSIUM SERPL-SCNC: 3.7 MMOL/L (ref 3.5–5.1)
PROT SERPL-MCNC: 7.3 G/DL (ref 6.4–8.2)
RBC # BLD AUTO: 5.43 X10(6)UL
SODIUM SERPL-SCNC: 139 MMOL/L (ref 136–145)
TRIGL SERPL-MCNC: 81 MG/DL (ref 30–149)
TSI SER-ACNC: 1.15 MIU/ML (ref 0.36–3.74)
VLDLC SERPL CALC-MCNC: 16 MG/DL (ref 0–30)
WBC # BLD AUTO: 7.6 X10(3) UL (ref 4–11)

## 2022-03-16 PROCEDURE — 3046F HEMOGLOBIN A1C LEVEL >9.0%: CPT | Performed by: INTERNAL MEDICINE

## 2022-03-16 PROCEDURE — 82570 ASSAY OF URINE CREATININE: CPT | Performed by: INTERNAL MEDICINE

## 2022-03-16 PROCEDURE — 36415 COLL VENOUS BLD VENIPUNCTURE: CPT | Performed by: INTERNAL MEDICINE

## 2022-03-16 PROCEDURE — 80053 COMPREHEN METABOLIC PANEL: CPT | Performed by: INTERNAL MEDICINE

## 2022-03-16 PROCEDURE — 85025 COMPLETE CBC W/AUTO DIFF WBC: CPT | Performed by: INTERNAL MEDICINE

## 2022-03-16 PROCEDURE — 3060F POS MICROALBUMINURIA REV: CPT | Performed by: INTERNAL MEDICINE

## 2022-03-16 PROCEDURE — 83036 HEMOGLOBIN GLYCOSYLATED A1C: CPT | Performed by: INTERNAL MEDICINE

## 2022-03-16 PROCEDURE — 84443 ASSAY THYROID STIM HORMONE: CPT | Performed by: INTERNAL MEDICINE

## 2022-03-16 PROCEDURE — 80061 LIPID PANEL: CPT | Performed by: INTERNAL MEDICINE

## 2022-03-16 PROCEDURE — 82043 UR ALBUMIN QUANTITATIVE: CPT | Performed by: INTERNAL MEDICINE

## 2022-03-18 ENCOUNTER — TELEPHONE (OUTPATIENT)
Dept: INTERNAL MEDICINE CLINIC | Facility: CLINIC | Age: 57
End: 2022-03-18

## 2022-03-18 ENCOUNTER — OFFICE VISIT (OUTPATIENT)
Dept: INTERNAL MEDICINE CLINIC | Facility: CLINIC | Age: 57
End: 2022-03-18
Payer: COMMERCIAL

## 2022-03-18 VITALS
OXYGEN SATURATION: 99 % | TEMPERATURE: 98 F | HEART RATE: 81 BPM | DIASTOLIC BLOOD PRESSURE: 86 MMHG | BODY MASS INDEX: 37.38 KG/M2 | WEIGHT: 238.19 LBS | HEIGHT: 67 IN | SYSTOLIC BLOOD PRESSURE: 136 MMHG

## 2022-03-18 DIAGNOSIS — Z00.00 ANNUAL PHYSICAL EXAM: Primary | ICD-10-CM

## 2022-03-18 DIAGNOSIS — G47.33 OSA (OBSTRUCTIVE SLEEP APNEA): ICD-10-CM

## 2022-03-18 DIAGNOSIS — E11.9 TYPE 2 DIABETES MELLITUS WITHOUT COMPLICATION, UNSPECIFIED WHETHER LONG TERM INSULIN USE (HCC): ICD-10-CM

## 2022-03-18 DIAGNOSIS — E78.00 HYPERCHOLESTEREMIA: ICD-10-CM

## 2022-03-18 DIAGNOSIS — M47.816 LUMBAR SPONDYLOSIS: ICD-10-CM

## 2022-03-18 DIAGNOSIS — G56.00 CARPAL TUNNEL SYNDROME, UNSPECIFIED LATERALITY: ICD-10-CM

## 2022-03-18 DIAGNOSIS — I10 BENIGN HYPERTENSION: ICD-10-CM

## 2022-03-18 DIAGNOSIS — D12.6 ADENOMATOUS POLYP OF COLON, UNSPECIFIED PART OF COLON: ICD-10-CM

## 2022-03-18 PROCEDURE — 3079F DIAST BP 80-89 MM HG: CPT | Performed by: INTERNAL MEDICINE

## 2022-03-18 PROCEDURE — 99396 PREV VISIT EST AGE 40-64: CPT | Performed by: INTERNAL MEDICINE

## 2022-03-18 PROCEDURE — 3008F BODY MASS INDEX DOCD: CPT | Performed by: INTERNAL MEDICINE

## 2022-03-18 PROCEDURE — 3075F SYST BP GE 130 - 139MM HG: CPT | Performed by: INTERNAL MEDICINE

## 2022-03-18 RX ORDER — LOSARTAN POTASSIUM 50 MG/1
50 TABLET ORAL
Qty: 90 TABLET | Refills: 3 | Status: SHIPPED | OUTPATIENT
Start: 2022-03-18

## 2022-03-18 RX ORDER — INSULIN ASPART 100 [IU]/ML
INJECTION, SOLUTION INTRAVENOUS; SUBCUTANEOUS
Qty: 15 ML | Refills: 3 | Status: SHIPPED | OUTPATIENT
Start: 2022-03-18

## 2022-03-18 RX ORDER — ATORVASTATIN CALCIUM 20 MG/1
20 TABLET, FILM COATED ORAL
Qty: 90 TABLET | Refills: 3 | Status: SHIPPED | OUTPATIENT
Start: 2022-03-18

## 2022-03-18 RX ORDER — BLOOD-GLUCOSE METER
KIT MISCELLANEOUS
Qty: 200 STRIP | Refills: 11 | Status: SHIPPED | OUTPATIENT
Start: 2022-03-18

## 2022-03-18 RX ORDER — LANCETS
EACH MISCELLANEOUS
Qty: 102 EACH | Refills: 11 | Status: SHIPPED | OUTPATIENT
Start: 2022-03-18

## 2022-03-18 RX ORDER — PEN NEEDLE, DIABETIC 32 GX 1/4"
NEEDLE, DISPOSABLE MISCELLANEOUS
Qty: 200 EACH | Refills: 5 | Status: SHIPPED | OUTPATIENT
Start: 2022-03-18

## 2022-03-24 ENCOUNTER — TELEPHONE (OUTPATIENT)
Dept: INTERNAL MEDICINE CLINIC | Facility: CLINIC | Age: 57
End: 2022-03-24

## 2022-03-24 NOTE — TELEPHONE ENCOUNTER
Prior Authorization Form received for     [facility] FlexTouch (insulin degludec injection)     Form placed in purple folder

## 2022-03-28 NOTE — TELEPHONE ENCOUNTER
Fax rec'd from [address] regarding Prior Authorization for:  Noemí Stevens cannot locate this patient in our system with information provided\"  Fax placed in purple folder  Tasked to RX

## 2022-03-30 NOTE — TELEPHONE ENCOUNTER
Spoke to [employer] to obtain more information on PA. Pharmacist went to run medication through to obtain denial and advised RN that Marilyn Joel was approved by insurance this time with a $20 copay and no PA is required. They will get medication ready for PU. Disregard PA.

## 2022-03-31 ENCOUNTER — ORDER TRANSCRIPTION (OUTPATIENT)
Dept: ADMINISTRATIVE | Facility: HOSPITAL | Age: 57
End: 2022-03-31

## 2022-04-01 RX ORDER — GABAPENTIN 100 MG/1
CAPSULE ORAL
Qty: 90 CAPSULE | Refills: 0 | Status: SHIPPED | OUTPATIENT
Start: 2022-04-01

## 2022-04-01 RX ORDER — GABAPENTIN 100 MG/1
100 CAPSULE ORAL 3 TIMES DAILY
Qty: 90 CAPSULE | Refills: 0 | Status: SHIPPED | OUTPATIENT
Start: 2022-04-01 | End: 2022-05-01

## 2022-04-11 ENCOUNTER — PROCEDURE VISIT (OUTPATIENT)
Dept: PHYSICAL MEDICINE AND REHAB | Facility: CLINIC | Age: 57
End: 2022-04-11
Payer: COMMERCIAL

## 2022-04-11 DIAGNOSIS — R20.0 ANESTHESIA OF SKIN: ICD-10-CM

## 2022-04-11 PROCEDURE — 95911 NRV CNDJ TEST 9-10 STUDIES: CPT | Performed by: PHYSICAL MEDICINE & REHABILITATION

## 2022-04-11 PROCEDURE — 95886 MUSC TEST DONE W/N TEST COMP: CPT | Performed by: PHYSICAL MEDICINE & REHABILITATION

## 2022-04-28 ENCOUNTER — TELEPHONE (OUTPATIENT)
Dept: INTERNAL MEDICINE CLINIC | Facility: CLINIC | Age: 57
End: 2022-04-28

## 2022-04-28 RX ORDER — CYCLOBENZAPRINE HCL 5 MG
5 TABLET ORAL 3 TIMES DAILY PRN
Qty: 60 TABLET | Refills: 0 | Status: SHIPPED | OUTPATIENT
Start: 2022-04-28

## 2022-04-28 NOTE — TELEPHONE ENCOUNTER
Spoke to pt and relayed MD message and instructions. Pt reports he has already tried tylenol and ibuprofen, but will try cyclobenzaprine.

## 2022-04-28 NOTE — TELEPHONE ENCOUNTER
To Dr Silvia Moore  Please advise    C/O Rt sided mid back pain starting Sunday after carrying 12 bags of 50 lb bags of pea stone rocks. Twisting and bending worsens pain. Denies any numbness or tingling to LE. Used Aspercreme and taking Ibuprofen 800 mg 2- 3 X a day with very minor relief, pain at 8/10. Hx of sciatica and degenerative disc and facet dx to LS spine per MRI on 5/22/18. Has an appt to see pain specialist on 5/23. Offered to make an appt today but declined due to short staffing at his job. States works from [employer] to ,      Requesting if we can prescribe something over the phone without being evaluated.  Has had Cyclobenzaprine in the past along with Tramadol and Norco.

## 2022-04-28 NOTE — TELEPHONE ENCOUNTER
Pt hurt his back doing yard work, moving bags of decorative stones  He is not seeing the pain doctor until 5/23    Asks if on call associate could prescribe tramadol to cover until he sees the pain doctor   - Tylenol 800 is not helping   [facility] is [facility]     Please call patient [phone]

## 2022-05-23 ENCOUNTER — TELEPHONE (OUTPATIENT)
Dept: INTERNAL MEDICINE CLINIC | Facility: CLINIC | Age: 57
End: 2022-05-23

## 2022-05-23 NOTE — TELEPHONE ENCOUNTER
Nurse from [facility] TAD HERNANDEZ is calling to verify patients medication list.    Please call and advise

## 2022-08-05 PROCEDURE — 96372 THER/PROPH/DIAG INJ SC/IM: CPT

## 2022-08-05 PROCEDURE — 99284 EMERGENCY DEPT VISIT MOD MDM: CPT

## 2022-08-05 PROCEDURE — 36415 COLL VENOUS BLD VENIPUNCTURE: CPT

## 2022-08-06 ENCOUNTER — HOSPITAL ENCOUNTER (EMERGENCY)
Facility: HOSPITAL | Age: 57
Discharge: HOME | End: 2022-08-06
Attending: EMERGENCY MEDICINE
Payer: COMMERCIAL

## 2022-08-06 ENCOUNTER — APPOINTMENT (OUTPATIENT)
Dept: CT IMAGING | Facility: HOSPITAL | Age: 57
End: 2022-08-06
Attending: EMERGENCY MEDICINE
Payer: COMMERCIAL

## 2022-08-06 VITALS
DIASTOLIC BLOOD PRESSURE: 89 MMHG | OXYGEN SATURATION: 97 % | BODY MASS INDEX: 37.67 KG/M2 | SYSTOLIC BLOOD PRESSURE: 154 MMHG | WEIGHT: 240 LBS | TEMPERATURE: 99 F | HEART RATE: 95 BPM | RESPIRATION RATE: 15 BRPM | HEIGHT: 67 IN

## 2022-08-06 DIAGNOSIS — R10.9 FLANK PAIN: Primary | ICD-10-CM

## 2022-08-06 LAB
ANION GAP SERPL CALC-SCNC: 7 MMOL/L (ref 0–18)
BASOPHILS # BLD AUTO: 0.05 X10(3) UL (ref 0–0.2)
BASOPHILS NFR BLD AUTO: 0.7 %
BILIRUB UR QL: NEGATIVE
BUN BLD-MCNC: 16 MG/DL (ref 7–18)
BUN/CREAT SERPL: 19.3 (ref 10–20)
CALCIUM BLD-MCNC: 9.2 MG/DL (ref 8.5–10.1)
CHLORIDE SERPL-SCNC: 101 MMOL/L (ref 98–112)
CLARITY UR: CLEAR
CO2 SERPL-SCNC: 26 MMOL/L (ref 21–32)
COLOR UR: YELLOW
CREAT BLD-MCNC: 0.83 MG/DL
DEPRECATED RDW RBC AUTO: 37 FL (ref 35.1–46.3)
EOSINOPHIL # BLD AUTO: 0.07 X10(3) UL (ref 0–0.7)
EOSINOPHIL NFR BLD AUTO: 0.9 %
ERYTHROCYTE [DISTWIDTH] IN BLOOD BY AUTOMATED COUNT: 11.9 % (ref 11–15)
GFR SERPLBLD BASED ON 1.73 SQ M-ARVRAT: 104 ML/MIN/1.73M2 (ref 60–?)
GLUCOSE BLD-MCNC: 377 MG/DL (ref 70–99)
GLUCOSE UR-MCNC: 500 MG/DL
HCT VFR BLD AUTO: 46.2 %
HGB BLD-MCNC: 15.7 G/DL
IMM GRANULOCYTES # BLD AUTO: 0.02 X10(3) UL (ref 0–1)
IMM GRANULOCYTES NFR BLD: 0.3 %
KETONES UR-MCNC: NEGATIVE MG/DL
LEUKOCYTE ESTERASE UR QL STRIP.AUTO: NEGATIVE
LYMPHOCYTES # BLD AUTO: 2.84 X10(3) UL (ref 1–4)
LYMPHOCYTES NFR BLD AUTO: 37.4 %
MCH RBC QN AUTO: 29.2 PG (ref 26–34)
MCHC RBC AUTO-ENTMCNC: 34 G/DL (ref 31–37)
MCV RBC AUTO: 86 FL
MONOCYTES # BLD AUTO: 0.6 X10(3) UL (ref 0.1–1)
MONOCYTES NFR BLD AUTO: 7.9 %
NEUTROPHILS # BLD AUTO: 4.02 X10 (3) UL (ref 1.5–7.7)
NEUTROPHILS # BLD AUTO: 4.02 X10(3) UL (ref 1.5–7.7)
NEUTROPHILS NFR BLD AUTO: 52.8 %
NITRITE UR QL STRIP.AUTO: NEGATIVE
OSMOLALITY SERPL CALC.SUM OF ELEC: 295 MOSM/KG (ref 275–295)
PH UR: 5.5 [PH] (ref 5–8)
PLATELET # BLD AUTO: 246 10(3)UL (ref 150–450)
POTASSIUM SERPL-SCNC: 3.7 MMOL/L (ref 3.5–5.1)
RBC # BLD AUTO: 5.37 X10(6)UL
SODIUM SERPL-SCNC: 134 MMOL/L (ref 136–145)
SP GR UR STRIP: 1.01 (ref 1–1.03)
UROBILINOGEN UR STRIP-ACNC: 0.2
WBC # BLD AUTO: 7.6 X10(3) UL (ref 4–11)

## 2022-08-06 PROCEDURE — 81015 MICROSCOPIC EXAM OF URINE: CPT

## 2022-08-06 PROCEDURE — 81001 URINALYSIS AUTO W/SCOPE: CPT

## 2022-08-06 PROCEDURE — 74176 CT ABD & PELVIS W/O CONTRAST: CPT | Performed by: EMERGENCY MEDICINE

## 2022-08-06 PROCEDURE — 85025 COMPLETE CBC W/AUTO DIFF WBC: CPT

## 2022-08-06 PROCEDURE — 80048 BASIC METABOLIC PNL TOTAL CA: CPT

## 2022-08-06 RX ORDER — KETOROLAC TROMETHAMINE 10 MG/1
10 TABLET, FILM COATED ORAL EVERY 6 HOURS PRN
Qty: 20 TABLET | Refills: 0 | Status: SHIPPED | OUTPATIENT
Start: 2022-08-06 | End: 2022-08-06

## 2022-08-06 RX ORDER — KETOROLAC TROMETHAMINE 10 MG/1
10 TABLET, FILM COATED ORAL EVERY 6 HOURS PRN
Qty: 20 TABLET | Refills: 0 | Status: SHIPPED | OUTPATIENT
Start: 2022-08-06 | End: 2022-08-11

## 2022-08-06 RX ORDER — KETOROLAC TROMETHAMINE 30 MG/ML
30 INJECTION, SOLUTION INTRAMUSCULAR; INTRAVENOUS ONCE
Status: COMPLETED | OUTPATIENT
Start: 2022-08-06 | End: 2022-08-06

## 2022-08-06 NOTE — ED INITIAL ASSESSMENT (HPI)
Patient reports right flank pain x2 weeks, worsening over the last 2-3 days with accompanying nausea. Denies fever. No vomiting. No diarrhea. No hematuria.

## 2022-10-26 ENCOUNTER — OFFICE VISIT (OUTPATIENT)
Dept: INTERNAL MEDICINE CLINIC | Facility: CLINIC | Age: 57
End: 2022-10-26
Payer: COMMERCIAL

## 2022-10-26 ENCOUNTER — HOSPITAL ENCOUNTER (OUTPATIENT)
Dept: GENERAL RADIOLOGY | Facility: HOSPITAL | Age: 57
Discharge: HOME | End: 2022-10-26
Attending: INTERNAL MEDICINE
Payer: COMMERCIAL

## 2022-10-26 VITALS
BODY MASS INDEX: 34.53 KG/M2 | OXYGEN SATURATION: 98 % | HEART RATE: 96 BPM | WEIGHT: 220 LBS | HEIGHT: 67 IN | DIASTOLIC BLOOD PRESSURE: 86 MMHG | SYSTOLIC BLOOD PRESSURE: 126 MMHG

## 2022-10-26 DIAGNOSIS — E78.00 HYPERCHOLESTEREMIA: ICD-10-CM

## 2022-10-26 DIAGNOSIS — I10 BENIGN HYPERTENSION: ICD-10-CM

## 2022-10-26 DIAGNOSIS — E11.9 TYPE 2 DIABETES MELLITUS WITHOUT COMPLICATION, UNSPECIFIED WHETHER LONG TERM INSULIN USE (HCC): ICD-10-CM

## 2022-10-26 DIAGNOSIS — M54.50 LUMBAR PAIN: ICD-10-CM

## 2022-10-26 DIAGNOSIS — M54.50 LUMBAR PAIN: Primary | ICD-10-CM

## 2022-10-26 DIAGNOSIS — M47.816 LUMBAR SPONDYLOSIS: ICD-10-CM

## 2022-10-26 PROCEDURE — 3074F SYST BP LT 130 MM HG: CPT | Performed by: INTERNAL MEDICINE

## 2022-10-26 PROCEDURE — 99214 OFFICE O/P EST MOD 30 MIN: CPT | Performed by: INTERNAL MEDICINE

## 2022-10-26 PROCEDURE — 3008F BODY MASS INDEX DOCD: CPT | Performed by: INTERNAL MEDICINE

## 2022-10-26 PROCEDURE — 72110 X-RAY EXAM L-2 SPINE 4/>VWS: CPT | Performed by: INTERNAL MEDICINE

## 2022-10-26 PROCEDURE — 3079F DIAST BP 80-89 MM HG: CPT | Performed by: INTERNAL MEDICINE

## 2022-10-26 RX ORDER — MELOXICAM 15 MG/1
15 TABLET ORAL DAILY
Qty: 30 TABLET | Refills: 3 | Status: SHIPPED | OUTPATIENT
Start: 2022-10-26

## 2022-10-28 ENCOUNTER — TELEPHONE (OUTPATIENT)
Dept: INTERNAL MEDICINE CLINIC | Facility: CLINIC | Age: 57
End: 2022-10-28

## 2022-11-09 ENCOUNTER — HOSPITAL ENCOUNTER (OUTPATIENT)
Age: 57
Discharge: HOME | End: 2022-11-09
Payer: COMMERCIAL

## 2022-11-09 VITALS
OXYGEN SATURATION: 99 % | DIASTOLIC BLOOD PRESSURE: 79 MMHG | TEMPERATURE: 98 F | RESPIRATION RATE: 18 BRPM | SYSTOLIC BLOOD PRESSURE: 143 MMHG | HEART RATE: 91 BPM

## 2022-11-09 DIAGNOSIS — R68.83 CHILLS: ICD-10-CM

## 2022-11-09 DIAGNOSIS — Z20.822 ENCOUNTER FOR LABORATORY TESTING FOR COVID-19 VIRUS: ICD-10-CM

## 2022-11-09 DIAGNOSIS — J06.9 UPPER RESPIRATORY TRACT INFECTION, UNSPECIFIED TYPE: Primary | ICD-10-CM

## 2022-11-09 LAB
POCT INFLUENZA A: NEGATIVE
POCT INFLUENZA B: NEGATIVE
S PYO AG THROAT QL: NEGATIVE
SARS-COV-2 RNA RESP QL NAA+PROBE: NOT DETECTED

## 2022-11-09 PROCEDURE — 99213 OFFICE O/P EST LOW 20 MIN: CPT | Performed by: NURSE PRACTITIONER

## 2022-11-09 PROCEDURE — 87502 INFLUENZA DNA AMP PROBE: CPT | Performed by: NURSE PRACTITIONER

## 2022-11-09 PROCEDURE — U0002 COVID-19 LAB TEST NON-CDC: HCPCS | Performed by: NURSE PRACTITIONER

## 2022-11-09 PROCEDURE — 87880 STREP A ASSAY W/OPTIC: CPT | Performed by: NURSE PRACTITIONER

## 2022-11-09 NOTE — ED INITIAL ASSESSMENT (HPI)
Pt here with complaints of sore throat ,and body aches that began today , pt son tested +strep, pt denies any fevers or sob

## 2022-11-18 ENCOUNTER — HOSPITAL ENCOUNTER (EMERGENCY)
Facility: HOSPITAL | Age: 57
Discharge: HOME | End: 2022-11-18
Attending: EMERGENCY MEDICINE
Payer: COMMERCIAL

## 2022-11-18 ENCOUNTER — APPOINTMENT (OUTPATIENT)
Dept: CT IMAGING | Facility: HOSPITAL | Age: 57
End: 2022-11-18
Attending: EMERGENCY MEDICINE
Payer: COMMERCIAL

## 2022-11-18 ENCOUNTER — HOSPITAL ENCOUNTER (OUTPATIENT)
Age: 57
Discharge: ANOTHER HOSPITAL | End: 2022-11-18
Payer: COMMERCIAL

## 2022-11-18 VITALS
TEMPERATURE: 97 F | RESPIRATION RATE: 16 BRPM | HEART RATE: 97 BPM | OXYGEN SATURATION: 100 % | SYSTOLIC BLOOD PRESSURE: 126 MMHG | DIASTOLIC BLOOD PRESSURE: 84 MMHG

## 2022-11-18 VITALS
WEIGHT: 240 LBS | DIASTOLIC BLOOD PRESSURE: 82 MMHG | BODY MASS INDEX: 38 KG/M2 | SYSTOLIC BLOOD PRESSURE: 121 MMHG | RESPIRATION RATE: 18 BRPM | HEART RATE: 85 BPM | TEMPERATURE: 98 F | OXYGEN SATURATION: 97 %

## 2022-11-18 DIAGNOSIS — N48.1 BALANITIS: Primary | ICD-10-CM

## 2022-11-18 DIAGNOSIS — R10.9 RIGHT FLANK PAIN: ICD-10-CM

## 2022-11-18 DIAGNOSIS — R73.9 HYPERGLYCEMIA: ICD-10-CM

## 2022-11-18 DIAGNOSIS — R10.9 ABDOMINAL PAIN, ACUTE: ICD-10-CM

## 2022-11-18 DIAGNOSIS — R10.31 ABDOMINAL PAIN, RIGHT LOWER QUADRANT: Primary | ICD-10-CM

## 2022-11-18 DIAGNOSIS — R30.0 DYSURIA: ICD-10-CM

## 2022-11-18 LAB
ANION GAP SERPL CALC-SCNC: 5 MMOL/L (ref 0–18)
BASOPHILS # BLD AUTO: 0.02 X10(3) UL (ref 0–0.2)
BASOPHILS NFR BLD AUTO: 0.3 %
BILIRUB UR QL STRIP: NEGATIVE
BILIRUB UR QL: NEGATIVE
BUN BLD-MCNC: 17 MG/DL (ref 7–18)
BUN/CREAT SERPL: 16.3 (ref 10–20)
CALCIUM BLD-MCNC: 9.5 MG/DL (ref 8.5–10.1)
CHLORIDE SERPL-SCNC: 99 MMOL/L (ref 98–112)
CLARITY UR: CLEAR
CLARITY UR: CLEAR
CO2 SERPL-SCNC: 29 MMOL/L (ref 21–32)
COLOR UR: YELLOW
COLOR UR: YELLOW
CREAT BLD-MCNC: 1.04 MG/DL
DEPRECATED RDW RBC AUTO: 38.5 FL (ref 35.1–46.3)
EOSINOPHIL # BLD AUTO: 0.02 X10(3) UL (ref 0–0.7)
EOSINOPHIL NFR BLD AUTO: 0.3 %
ERYTHROCYTE [DISTWIDTH] IN BLOOD BY AUTOMATED COUNT: 11.9 % (ref 11–15)
GFR SERPLBLD BASED ON 1.73 SQ M-ARVRAT: 85 ML/MIN/1.73M2 (ref 60–?)
GLUCOSE BLD-MCNC: 284 MG/DL (ref 70–99)
GLUCOSE BLDC GLUCOMTR-MCNC: 336 MG/DL (ref 70–99)
GLUCOSE UR STRIP-MCNC: >=1000 MG/DL
GLUCOSE UR-MCNC: 500 MG/DL
HCT VFR BLD AUTO: 50.6 %
HGB BLD-MCNC: 16.9 G/DL
IMM GRANULOCYTES # BLD AUTO: 0.02 X10(3) UL (ref 0–1)
IMM GRANULOCYTES NFR BLD: 0.3 %
KETONES UR STRIP-MCNC: NEGATIVE MG/DL
LEUKOCYTE ESTERASE UR QL STRIP.AUTO: NEGATIVE
LEUKOCYTE ESTERASE UR QL STRIP: NEGATIVE
LYMPHOCYTES # BLD AUTO: 1.42 X10(3) UL (ref 1–4)
LYMPHOCYTES NFR BLD AUTO: 22.3 %
MCH RBC QN AUTO: 29.6 PG (ref 26–34)
MCHC RBC AUTO-ENTMCNC: 33.4 G/DL (ref 31–37)
MCV RBC AUTO: 88.6 FL
MONOCYTES # BLD AUTO: 0.5 X10(3) UL (ref 0.1–1)
MONOCYTES NFR BLD AUTO: 7.8 %
NEUTROPHILS # BLD AUTO: 4.39 X10 (3) UL (ref 1.5–7.7)
NEUTROPHILS # BLD AUTO: 4.39 X10(3) UL (ref 1.5–7.7)
NEUTROPHILS NFR BLD AUTO: 69 %
NITRITE UR QL STRIP.AUTO: NEGATIVE
NITRITE UR QL STRIP: NEGATIVE
OSMOLALITY SERPL CALC.SUM OF ELEC: 288 MOSM/KG (ref 275–295)
PH UR STRIP: 5.5 [PH]
PH UR: 5.5 [PH] (ref 5–8)
PLATELET # BLD AUTO: 213 10(3)UL (ref 150–450)
POTASSIUM SERPL-SCNC: 4.3 MMOL/L (ref 3.5–5.1)
PROT UR STRIP-MCNC: 100 MG/DL
RBC # BLD AUTO: 5.71 X10(6)UL
SODIUM SERPL-SCNC: 133 MMOL/L (ref 136–145)
SP GR UR STRIP: 1.02
SP GR UR STRIP: 1.02 (ref 1–1.03)
UROBILINOGEN UR STRIP-ACNC: 0.2
UROBILINOGEN UR STRIP-ACNC: <2 MG/DL
WBC # BLD AUTO: 6.4 X10(3) UL (ref 4–11)

## 2022-11-18 PROCEDURE — 74178 CT ABD&PLV WO CNTR FLWD CNTR: CPT | Performed by: EMERGENCY MEDICINE

## 2022-11-18 PROCEDURE — 80048 BASIC METABOLIC PNL TOTAL CA: CPT | Performed by: EMERGENCY MEDICINE

## 2022-11-18 PROCEDURE — 81015 MICROSCOPIC EXAM OF URINE: CPT | Performed by: EMERGENCY MEDICINE

## 2022-11-18 PROCEDURE — 81001 URINALYSIS AUTO W/SCOPE: CPT | Performed by: EMERGENCY MEDICINE

## 2022-11-18 PROCEDURE — 99214 OFFICE O/P EST MOD 30 MIN: CPT | Performed by: NURSE PRACTITIONER

## 2022-11-18 PROCEDURE — 82962 GLUCOSE BLOOD TEST: CPT | Performed by: NURSE PRACTITIONER

## 2022-11-18 PROCEDURE — 85025 COMPLETE CBC W/AUTO DIFF WBC: CPT | Performed by: EMERGENCY MEDICINE

## 2022-11-18 PROCEDURE — 99284 EMERGENCY DEPT VISIT MOD MDM: CPT

## 2022-11-18 PROCEDURE — 81002 URINALYSIS NONAUTO W/O SCOPE: CPT | Performed by: NURSE PRACTITIONER

## 2022-11-18 PROCEDURE — 96374 THER/PROPH/DIAG INJ IV PUSH: CPT

## 2022-11-18 RX ORDER — CLOTRIMAZOLE 1 %
1 CREAM (GRAM) TOPICAL 2 TIMES DAILY
Qty: 1 EACH | Refills: 0 | Status: SHIPPED | OUTPATIENT
Start: 2022-11-18

## 2022-11-18 RX ORDER — MORPHINE SULFATE 4 MG/ML
4 INJECTION, SOLUTION INTRAMUSCULAR; INTRAVENOUS ONCE
Status: COMPLETED | OUTPATIENT
Start: 2022-11-18 | End: 2022-11-18

## 2022-11-18 NOTE — ED INITIAL ASSESSMENT (HPI)
Pt presents with burning with urination, low abdominal pain, low back pain and chills x 24 hours.      Pt is uncircumcised and has had UTI in the past.

## 2023-01-16 ENCOUNTER — HOSPITAL ENCOUNTER (OUTPATIENT)
Age: 58
Discharge: HOME | End: 2023-01-16
Payer: COMMERCIAL

## 2023-01-16 VITALS
HEART RATE: 100 BPM | DIASTOLIC BLOOD PRESSURE: 65 MMHG | OXYGEN SATURATION: 98 % | RESPIRATION RATE: 20 BRPM | TEMPERATURE: 98 F | SYSTOLIC BLOOD PRESSURE: 146 MMHG

## 2023-01-16 DIAGNOSIS — J06.9 VIRAL URI WITH COUGH: Primary | ICD-10-CM

## 2023-01-16 PROCEDURE — 99213 OFFICE O/P EST LOW 20 MIN: CPT | Performed by: NURSE PRACTITIONER

## 2023-01-16 PROCEDURE — 87502 INFLUENZA DNA AMP PROBE: CPT | Performed by: NURSE PRACTITIONER

## 2023-01-16 PROCEDURE — U0002 COVID-19 LAB TEST NON-CDC: HCPCS | Performed by: NURSE PRACTITIONER

## 2023-01-16 PROCEDURE — 87880 STREP A ASSAY W/OPTIC: CPT | Performed by: NURSE PRACTITIONER

## 2023-01-16 NOTE — ED INITIAL ASSESSMENT (HPI)
Pt states having cough body aches and fever of 101 on and off that began on Friday, pt states wife tested pos for strep today. Pt states having diarrhea on and off but denies vomiting.

## 2023-01-17 ENCOUNTER — TELEPHONE (OUTPATIENT)
Dept: INTERNAL MEDICINE CLINIC | Facility: CLINIC | Age: 58
End: 2023-01-17

## 2023-01-17 ENCOUNTER — PATIENT MESSAGE (OUTPATIENT)
Dept: INTERNAL MEDICINE CLINIC | Facility: CLINIC | Age: 58
End: 2023-01-17

## 2023-01-17 DIAGNOSIS — R05.1 ACUTE COUGH: ICD-10-CM

## 2023-01-17 DIAGNOSIS — J40 BRONCHITIS: Primary | ICD-10-CM

## 2023-01-17 DIAGNOSIS — J06.9 VIRAL URI: ICD-10-CM

## 2023-01-17 PROCEDURE — 99442 PHONE E/M BY PHYS 11-20 MIN: CPT | Performed by: INTERNAL MEDICINE

## 2023-01-17 RX ORDER — PROMETHAZINE HYDROCHLORIDE AND CODEINE PHOSPHATE 6.25; 1 MG/5ML; MG/5ML
SOLUTION ORAL EVERY 6 HOURS PRN
Qty: 180 ML | Refills: 0 | Status: SHIPPED | OUTPATIENT
Start: 2023-01-17

## 2023-01-17 RX ORDER — AZITHROMYCIN 250 MG/1
TABLET, FILM COATED ORAL
Qty: 6 TABLET | Refills: 0 | Status: SHIPPED | OUTPATIENT
Start: 2023-01-17 | End: 2023-01-22

## 2023-01-17 NOTE — TELEPHONE ENCOUNTER
Pt was seen yesterday in the [address] [place] [address] declined Rx for benzonatate tablets because they do not help him  Hoping cough med with codeine can be prescribed   (Dr Gauri Russo has prescribed this in the past & it helps)    does pt need to be seen or can on call doctor prescribe for him     Call back # [phone]

## 2023-01-17 NOTE — TELEPHONE ENCOUNTER
To On Call (Dr. SALCIDO):  Please advise in absence of Dr. Luis Washington at [facility] yesterday; Dx: Viral URI with cough. Rapid COVID, strep and Influenza negative. Requesting promethazine-codeine 6.25-10 MG/5ML Oral Syrup refill.      URI Triage:     Fever: Yes[x]               No[]                 Unknown[]  [x] Temperature: Tmax 102 F, 99 F today  [x] Chills   [x] Night sweats  [x] Body aches     Cough: Yes[x]              No[]  [] Productive cough  [] Cough with exertion  [x] Dry cough     Respiratory Symptoms: Yes[]          No[]  [] Wheezing  [] Pain with deep breathing  [] SOB with exertion  [] SOB at rest  [] Heavy breathing  [x] Chest discomfort from coughing     GI Symptoms: Yes [x]            No[]  [x] Diarrhea  [] Nausea  [] Vomiting  [] Abdominal pain  [] Lack of appetite     Other symptoms:  [x] Sore throat  [] Difficulty swallowing  [x] Sinus pressure  [x] Nasal drainage  [x] Nasal congestion  [x] Chest congestion  [x] Head congestion  [x] PND  [x] Facial pain   [] Ear pain  [] Conjunctivitis  [x] Headache  [x] Fatigue  [x] Weakness  [] Loss of sense of smell   [] Loss of sense of taste     [x]OTC Medications:   DayQuil, NyQuil  Cough drops     Vaccinated: Yes  [x]   No []  Booster:  Yes  [x]  No []     Symptom onset: 1/12      [id number] - [address], [place] - [address] [facility], [phone]

## 2023-01-17 NOTE — TELEPHONE ENCOUNTER
Patient called and informed Dr Jack Figueroa will give him a call for a telephone visit afte his in clinic patient's. Patient verbalized understanding and in agreement.

## 2023-01-17 NOTE — TELEPHONE ENCOUNTER
Virtual Visit/Telephone Note    Jason Nuñez verbally consents to a Virtual/Telephone Check-In service on 23  Patient understands and accepts financial responsibility for any deductible, co-insurance and/or co-pays associated with this service. Duration/time spent of the service: 20 Minutes of direct patient contact. 10 Minutes of chart review, documentation, medical decision making. HPI:   Jason Nuñez is a 56year old male who presents for complains of: Patient presents with:  Acute: Cough, IC at [facility], diagnosed with strep  Acute cough and cold continued: Patient was seen in immediate care and has a sick contact with his wife for test positive for strep, he was negative for strep, COVID, influenza, and was discharged home with benzonatate Perles. He has coughing jags, could not sleep because of the cough, has productive sputum, is a diabetic. Physical exam:   Telephone visit only, obvious productive cough, difficulty with full sentences without coughing, no obvious shortness of breath, no tachypnea    ASSESSMENT AND PLAN:   Jason Nuñez is a 56year old male who presents with the followin. Bronchitis  I like the idea of taking an antibiotic, along with the codeine cough syrup, this can be used as prescribed, and along with the benzonatate Perles as well. If we are still not turning the corner by the end of the week you will need to call the office, we could possibly augment things with steroids here but that would be up to the PCP or me depending on how you do over the next few days. Contact precautions with strict hand hygiene, and retesting at home for COVID is always a good idea if you are still feeling symptoms. - azithromycin (ZITHROMAX Z-JEAN) 250 MG Oral Tab; Take 2 tablets (500 mg total) by mouth daily for 1 day, THEN 1 tablet (250 mg total) daily for 4 days. Dispense: 6 tablet; Refill: 0    2.  Acute cough  As above, make sure you call the pharmacy before going to get this medication, realize that it has been on national back order, may have difficulty acquiring, let us know by calling in the near future for the office is closed  - Promethazine-Codeine 6.25-10 MG/5ML Oral Solution; Take 5-10 mL by mouth every 6 (six) hours as needed (cough). Dispense: 180 mL; Refill: 0    3. Viral URI  As above. Mimi Denny DO  1/17/2023  1:06 PM    Spent 30 minutes obtaining history, evaluating patient, discussing treatment options, diet, exercise, review of available labs and radiology reports, and completing documentation.

## 2023-03-01 ENCOUNTER — LAB ENCOUNTER (OUTPATIENT)
Dept: LAB | Age: 58
End: 2023-03-01
Attending: INTERNAL MEDICINE
Payer: COMMERCIAL

## 2023-03-01 ENCOUNTER — TELEPHONE (OUTPATIENT)
Dept: INTERNAL MEDICINE CLINIC | Facility: CLINIC | Age: 58
End: 2023-03-01

## 2023-03-01 ENCOUNTER — OFFICE VISIT (OUTPATIENT)
Dept: INTERNAL MEDICINE CLINIC | Facility: CLINIC | Age: 58
End: 2023-03-01

## 2023-03-01 VITALS
HEART RATE: 85 BPM | BODY MASS INDEX: 35 KG/M2 | HEIGHT: 67 IN | TEMPERATURE: 99 F | WEIGHT: 223 LBS | OXYGEN SATURATION: 99 % | SYSTOLIC BLOOD PRESSURE: 124 MMHG | DIASTOLIC BLOOD PRESSURE: 78 MMHG

## 2023-03-01 DIAGNOSIS — R81 GLYCOSURIA: ICD-10-CM

## 2023-03-01 DIAGNOSIS — E11.9 TYPE 2 DIABETES MELLITUS WITHOUT COMPLICATION, UNSPECIFIED WHETHER LONG TERM INSULIN USE (HCC): ICD-10-CM

## 2023-03-01 DIAGNOSIS — R10.9 FLANK PAIN: Primary | ICD-10-CM

## 2023-03-01 LAB
ANION GAP SERPL CALC-SCNC: 4 MMOL/L (ref 0–18)
APPEARANCE: CLEAR
BILIRUB UR QL: NEGATIVE
BILIRUBIN: NEGATIVE
BUN BLD-MCNC: 10 MG/DL (ref 7–18)
BUN/CREAT SERPL: 10.1 (ref 10–20)
CALCIUM BLD-MCNC: 9.5 MG/DL (ref 8.5–10.1)
CHLORIDE SERPL-SCNC: 102 MMOL/L (ref 98–112)
CLARITY UR: CLEAR
CO2 SERPL-SCNC: 29 MMOL/L (ref 21–32)
COLOR UR: YELLOW
CREAT BLD-MCNC: 0.99 MG/DL
EST. AVERAGE GLUCOSE BLD GHB EST-MCNC: 341 MG/DL (ref 68–126)
FASTING STATUS PATIENT QL REPORTED: YES
GFR SERPLBLD BASED ON 1.73 SQ M-ARVRAT: 90 ML/MIN/1.73M2 (ref 60–?)
GLUCOSE BLD-MCNC: 362 MG/DL (ref 70–99)
GLUCOSE UR-MCNC: >=500 MG/DL
HBA1C MFR BLD: 13.5 % (ref ?–5.7)
KETONES UR-MCNC: NEGATIVE MG/DL
LEUKOCYTE ESTERASE UR QL STRIP.AUTO: NEGATIVE
LEUKOCYTES: NEGATIVE
MULTISTIX LOT#: ABNORMAL NUMERIC
NITRITE UR QL STRIP.AUTO: NEGATIVE
NITRITE, URINE: NEGATIVE
OSMOLALITY SERPL CALC.SUM OF ELEC: 294 MOSM/KG (ref 275–295)
PH UR: 5 [PH] (ref 5–8)
PH, URINE: 6 (ref 4.5–8)
POTASSIUM SERPL-SCNC: 4.7 MMOL/L (ref 3.5–5.1)
PROT UR-MCNC: 100 MG/DL
SODIUM SERPL-SCNC: 135 MMOL/L (ref 136–145)
SP GR UR STRIP: >1.03 (ref 1–1.03)
SPECIFIC GRAVITY: 1.01 (ref 1–1.03)
URINE-COLOR: YELLOW
UROBILINOGEN UR STRIP-ACNC: <2
UROBILINOGEN,SEMI-QN: 0.2 MG/DL (ref 0–1.9)
VIT C UR-MCNC: NEGATIVE MG/DL

## 2023-03-01 PROCEDURE — 3046F HEMOGLOBIN A1C LEVEL >9.0%: CPT | Performed by: INTERNAL MEDICINE

## 2023-03-01 PROCEDURE — 3008F BODY MASS INDEX DOCD: CPT | Performed by: INTERNAL MEDICINE

## 2023-03-01 PROCEDURE — 3078F DIAST BP <80 MM HG: CPT | Performed by: INTERNAL MEDICINE

## 2023-03-01 PROCEDURE — 3074F SYST BP LT 130 MM HG: CPT | Performed by: INTERNAL MEDICINE

## 2023-03-01 PROCEDURE — 80048 BASIC METABOLIC PNL TOTAL CA: CPT

## 2023-03-01 PROCEDURE — 99214 OFFICE O/P EST MOD 30 MIN: CPT | Performed by: INTERNAL MEDICINE

## 2023-03-01 PROCEDURE — 36415 COLL VENOUS BLD VENIPUNCTURE: CPT

## 2023-03-01 PROCEDURE — 83036 HEMOGLOBIN GLYCOSYLATED A1C: CPT

## 2023-03-01 PROCEDURE — 82010 KETONE BODYS QUAN: CPT

## 2023-03-01 PROCEDURE — 81001 URINALYSIS AUTO W/SCOPE: CPT | Performed by: INTERNAL MEDICINE

## 2023-03-01 PROCEDURE — 81002 URINALYSIS NONAUTO W/O SCOPE: CPT | Performed by: INTERNAL MEDICINE

## 2023-03-01 NOTE — TELEPHONE ENCOUNTER
To  - letter pending needs your signature , also he wants it uploaded to [facility] thanks

## 2023-03-01 NOTE — TELEPHONE ENCOUNTER
Please notify pt that his blood work was ok---glucose is very very high. Is he taking his insulin?     Would ask him to push fluids  He needs to follow up with Dr. Wagner Rodriguez tomorrow or Friday

## 2023-03-01 NOTE — TELEPHONE ENCOUNTER
Left message to call back. Also [facility] message sent  When he calls schedule mercy with DR. CORONA as soon as possible then to Triage

## 2023-03-01 NOTE — TELEPHONE ENCOUNTER
Patient had an office visit today with Dr. Misael Amezquita and forgot to ask for a ok to return to work note. Patient was absent from work on 2/28 and 3/1. Patient is asking if the letter could be uploaded to my chart. Patient is asking for the letter to be uploaded today so he can return to work tomorrow.

## 2023-03-01 NOTE — TELEPHONE ENCOUNTER
Letter generated-it should be available through [employer]; please notify pt    Also I had wanted him to follow up with Dr. Kenya Munguia next week--please make sure he schedules

## 2023-03-01 NOTE — TELEPHONE ENCOUNTER
Please advise- called patient and relayed DR. CORONA message - verbalized understanding . Patient can not  come to the office Thursday and Friday - when do you want to see him? He is taking is insulin, but his diet Is not good right now - to DR. CORONA

## 2023-03-01 NOTE — TELEPHONE ENCOUNTER
See me as soon as possible; sugars are extraordinarily high; Hgb A1c 13.5%; advise check sugar 3x per day before meals; and bring in numbers; please call pt

## 2023-03-02 NOTE — TELEPHONE ENCOUNTER
Patient was called to share the importance of scheduling an appointment with Dr Wililam Brunner today, tomorrow, or Monday per Dr Osmel Johnson. No answer. A message was left for patient to call back and schedule.

## 2023-03-02 NOTE — TELEPHONE ENCOUNTER
Patient was called again. No answer. A message was left again to call back and schedule an appointment with Dr Yara Reynolds. Awaiting patient's call back.

## 2023-03-02 NOTE — TELEPHONE ENCOUNTER
Also routed to [address] for a.m. - please try to get Fannie Logan an appointment to discuss abnormal labs. He has viewed this result via [employer] [marked seen].

## 2023-03-03 LAB — BETA-HYDROXYBUTYRIC ACID: 0.9 MG/DL

## 2023-05-02 ENCOUNTER — APPOINTMENT (OUTPATIENT)
Dept: GENERAL RADIOLOGY | Facility: HOSPITAL | Age: 58
End: 2023-05-02
Payer: COMMERCIAL

## 2023-05-02 ENCOUNTER — HOSPITAL ENCOUNTER (EMERGENCY)
Facility: HOSPITAL | Age: 58
Discharge: HOME | End: 2023-05-03
Attending: EMERGENCY MEDICINE
Payer: COMMERCIAL

## 2023-05-02 DIAGNOSIS — Z79.4 TYPE 2 DIABETES MELLITUS WITH HYPERGLYCEMIA, WITH LONG-TERM CURRENT USE OF INSULIN (HCC): ICD-10-CM

## 2023-05-02 DIAGNOSIS — E11.65 TYPE 2 DIABETES MELLITUS WITH HYPERGLYCEMIA, WITH LONG-TERM CURRENT USE OF INSULIN (HCC): ICD-10-CM

## 2023-05-02 DIAGNOSIS — R03.0 ELEVATED BLOOD PRESSURE READING: ICD-10-CM

## 2023-05-02 DIAGNOSIS — U07.1 COVID-19: Primary | ICD-10-CM

## 2023-05-02 PROCEDURE — 99284 EMERGENCY DEPT VISIT MOD MDM: CPT

## 2023-05-02 PROCEDURE — 96361 HYDRATE IV INFUSION ADD-ON: CPT

## 2023-05-02 PROCEDURE — 93005 ELECTROCARDIOGRAM TRACING: CPT

## 2023-05-02 PROCEDURE — 96374 THER/PROPH/DIAG INJ IV PUSH: CPT

## 2023-05-02 PROCEDURE — 71045 X-RAY EXAM CHEST 1 VIEW: CPT

## 2023-05-02 PROCEDURE — 93010 ELECTROCARDIOGRAM REPORT: CPT

## 2023-05-02 PROCEDURE — 99285 EMERGENCY DEPT VISIT HI MDM: CPT

## 2023-05-03 VITALS
DIASTOLIC BLOOD PRESSURE: 84 MMHG | BODY MASS INDEX: 36.41 KG/M2 | HEART RATE: 92 BPM | OXYGEN SATURATION: 96 % | SYSTOLIC BLOOD PRESSURE: 140 MMHG | HEIGHT: 67 IN | WEIGHT: 232 LBS | TEMPERATURE: 99 F | RESPIRATION RATE: 16 BRPM

## 2023-05-03 LAB
ALBUMIN SERPL-MCNC: 3.4 G/DL (ref 3.4–5)
ALBUMIN/GLOB SERPL: 0.8 {RATIO} (ref 1–2)
ALP LIVER SERPL-CCNC: 119 U/L
ALT SERPL-CCNC: 26 U/L
ANION GAP SERPL CALC-SCNC: 7 MMOL/L (ref 0–18)
AST SERPL-CCNC: 27 U/L (ref 15–37)
ATRIAL RATE: 110 BPM
BASOPHILS # BLD AUTO: 0.04 X10(3) UL (ref 0–0.2)
BASOPHILS NFR BLD AUTO: 0.5 %
BILIRUB SERPL-MCNC: 0.7 MG/DL (ref 0.1–2)
BUN BLD-MCNC: 18 MG/DL (ref 7–18)
BUN/CREAT SERPL: 17.8 (ref 10–20)
CALCIUM BLD-MCNC: 8.6 MG/DL (ref 8.5–10.1)
CHLORIDE SERPL-SCNC: 103 MMOL/L (ref 98–112)
CO2 SERPL-SCNC: 25 MMOL/L (ref 21–32)
CREAT BLD-MCNC: 1.01 MG/DL
DEPRECATED RDW RBC AUTO: 38.1 FL (ref 35.1–46.3)
EOSINOPHIL # BLD AUTO: 0.05 X10(3) UL (ref 0–0.7)
EOSINOPHIL NFR BLD AUTO: 0.6 %
ERYTHROCYTE [DISTWIDTH] IN BLOOD BY AUTOMATED COUNT: 11.9 % (ref 11–15)
GFR SERPLBLD BASED ON 1.73 SQ M-ARVRAT: 88 ML/MIN/1.73M2 (ref 60–?)
GLOBULIN PLAS-MCNC: 4.2 G/DL (ref 2.8–4.4)
GLUCOSE BLD-MCNC: 355 MG/DL (ref 70–99)
GLUCOSE BLDC GLUCOMTR-MCNC: 218 MG/DL (ref 70–99)
HCT VFR BLD AUTO: 46.7 %
HGB BLD-MCNC: 15.7 G/DL
IMM GRANULOCYTES # BLD AUTO: 0.02 X10(3) UL (ref 0–1)
IMM GRANULOCYTES NFR BLD: 0.2 %
LYMPHOCYTES # BLD AUTO: 1.16 X10(3) UL (ref 1–4)
LYMPHOCYTES NFR BLD AUTO: 14.4 %
MCH RBC QN AUTO: 29.2 PG (ref 26–34)
MCHC RBC AUTO-ENTMCNC: 33.6 G/DL (ref 31–37)
MCV RBC AUTO: 87 FL
MONOCYTES # BLD AUTO: 0.62 X10(3) UL (ref 0.1–1)
MONOCYTES NFR BLD AUTO: 7.7 %
NEUTROPHILS # BLD AUTO: 6.19 X10 (3) UL (ref 1.5–7.7)
NEUTROPHILS # BLD AUTO: 6.19 X10(3) UL (ref 1.5–7.7)
NEUTROPHILS NFR BLD AUTO: 76.6 %
OSMOLALITY SERPL CALC.SUM OF ELEC: 296 MOSM/KG (ref 275–295)
P AXIS: 36 DEGREES
P-R INTERVAL: 170 MS
PLATELET # BLD AUTO: 211 10(3)UL (ref 150–450)
POTASSIUM SERPL-SCNC: 4.2 MMOL/L (ref 3.5–5.1)
PROT SERPL-MCNC: 7.6 G/DL (ref 6.4–8.2)
Q-T INTERVAL: 334 MS
QRS DURATION: 98 MS
QTC CALCULATION (BEZET): 452 MS
R AXIS: 73 DEGREES
RBC # BLD AUTO: 5.37 X10(6)UL
SARS-COV-2 RNA RESP QL NAA+PROBE: DETECTED
SODIUM SERPL-SCNC: 135 MMOL/L (ref 136–145)
T AXIS: 42 DEGREES
TROPONIN I HIGH SENSITIVITY: 6 NG/L
VENTRICULAR RATE: 110 BPM
WBC # BLD AUTO: 8.1 X10(3) UL (ref 4–11)

## 2023-05-03 PROCEDURE — 85025 COMPLETE CBC W/AUTO DIFF WBC: CPT | Performed by: EMERGENCY MEDICINE

## 2023-05-03 PROCEDURE — 84484 ASSAY OF TROPONIN QUANT: CPT | Performed by: EMERGENCY MEDICINE

## 2023-05-03 PROCEDURE — 80053 COMPREHEN METABOLIC PANEL: CPT | Performed by: EMERGENCY MEDICINE

## 2023-05-03 PROCEDURE — 82962 GLUCOSE BLOOD TEST: CPT

## 2023-05-03 RX ORDER — KETOROLAC TROMETHAMINE 15 MG/ML
15 INJECTION, SOLUTION INTRAMUSCULAR; INTRAVENOUS ONCE
Status: COMPLETED | OUTPATIENT
Start: 2023-05-03 | End: 2023-05-03

## 2023-05-03 RX ORDER — ENALAPRILAT 2.5 MG/2ML
1.25 INJECTION INTRAVENOUS ONCE
Status: DISCONTINUED | OUTPATIENT
Start: 2023-05-03 | End: 2023-05-03

## 2023-05-03 NOTE — ED INITIAL ASSESSMENT (HPI)
Patient arrived ambulatory for covid exposure, and + covid test at home. Patient c/o cough that started today and abdominal pain from coughing.

## 2023-07-07 ENCOUNTER — OFFICE VISIT (OUTPATIENT)
Dept: INTERNAL MEDICINE CLINIC | Facility: CLINIC | Age: 58
End: 2023-07-07

## 2023-07-07 VITALS
SYSTOLIC BLOOD PRESSURE: 128 MMHG | HEART RATE: 80 BPM | WEIGHT: 226 LBS | OXYGEN SATURATION: 98 % | HEIGHT: 67 IN | TEMPERATURE: 98 F | BODY MASS INDEX: 35.47 KG/M2 | DIASTOLIC BLOOD PRESSURE: 74 MMHG | RESPIRATION RATE: 16 BRPM

## 2023-07-07 DIAGNOSIS — G47.33 OSA (OBSTRUCTIVE SLEEP APNEA): ICD-10-CM

## 2023-07-07 DIAGNOSIS — Z00.00 ANNUAL PHYSICAL EXAM: Primary | ICD-10-CM

## 2023-07-07 DIAGNOSIS — G56.00 CARPAL TUNNEL SYNDROME, UNSPECIFIED LATERALITY: ICD-10-CM

## 2023-07-07 DIAGNOSIS — Z12.5 SCREENING PSA (PROSTATE SPECIFIC ANTIGEN): ICD-10-CM

## 2023-07-07 DIAGNOSIS — E78.00 HYPERCHOLESTEREMIA: ICD-10-CM

## 2023-07-07 DIAGNOSIS — M47.816 LUMBAR SPONDYLOSIS: ICD-10-CM

## 2023-07-07 DIAGNOSIS — I10 BENIGN HYPERTENSION: ICD-10-CM

## 2023-07-07 DIAGNOSIS — D12.6 ADENOMATOUS POLYP OF COLON, UNSPECIFIED PART OF COLON: ICD-10-CM

## 2023-07-07 DIAGNOSIS — E11.9 TYPE 2 DIABETES MELLITUS WITHOUT COMPLICATION, UNSPECIFIED WHETHER LONG TERM INSULIN USE (HCC): ICD-10-CM

## 2023-07-07 PROCEDURE — 3078F DIAST BP <80 MM HG: CPT | Performed by: INTERNAL MEDICINE

## 2023-07-07 PROCEDURE — 90714 TD VACC NO PRESV 7 YRS+ IM: CPT | Performed by: INTERNAL MEDICINE

## 2023-07-07 PROCEDURE — 3074F SYST BP LT 130 MM HG: CPT | Performed by: INTERNAL MEDICINE

## 2023-07-07 PROCEDURE — 99396 PREV VISIT EST AGE 40-64: CPT | Performed by: INTERNAL MEDICINE

## 2023-07-07 PROCEDURE — 90471 IMMUNIZATION ADMIN: CPT | Performed by: INTERNAL MEDICINE

## 2023-07-07 PROCEDURE — 3008F BODY MASS INDEX DOCD: CPT | Performed by: INTERNAL MEDICINE

## 2023-07-07 RX ORDER — LOSARTAN POTASSIUM 50 MG/1
50 TABLET ORAL
Qty: 90 TABLET | Refills: 3 | Status: SHIPPED | OUTPATIENT
Start: 2023-07-07

## 2023-07-07 RX ORDER — ATORVASTATIN CALCIUM 20 MG/1
20 TABLET, FILM COATED ORAL
Qty: 90 TABLET | Refills: 3 | Status: SHIPPED | OUTPATIENT
Start: 2023-07-07

## 2023-07-15 ENCOUNTER — TELEPHONE (OUTPATIENT)
Dept: INTERNAL MEDICINE CLINIC | Facility: CLINIC | Age: 58
End: 2023-07-15

## 2023-07-18 NOTE — TELEPHONE ENCOUNTER
Medication last prescribed by Dr. Sandy Chirinos in 4/28/22 encounter for back pain after injuring back. Attempted to call patient x2 to inquire if he is having similar symptoms but line rings and then drops before going to voicemail. My chart sent.

## 2023-07-19 NOTE — TELEPHONE ENCOUNTER
To Dr Jeanie Klein -- please advise on refill for cyclobenzaprine. Pended 5mg TID PRN. 60/0. Last OV: 7/7/23  Last Fill: 4/28/22 60/0.   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 If I need to call and talk to you guys over the phone, then it'll have to wait till later. ..   Thanks     Temitope Moran

## 2023-07-20 RX ORDER — CYCLOBENZAPRINE HCL 5 MG
5 TABLET ORAL 3 TIMES DAILY PRN
Qty: 60 TABLET | Refills: 1 | Status: SHIPPED | OUTPATIENT
Start: 2023-07-20

## 2023-10-05 RX ORDER — INSULIN ASPART 100 [IU]/ML
INJECTION, SOLUTION INTRAVENOUS; SUBCUTANEOUS
Qty: 15 ML | Refills: 0 | Status: SHIPPED | OUTPATIENT
Start: 2023-10-05

## 2023-10-05 NOTE — TELEPHONE ENCOUNTER
Refill request is for a maintenance medication and has met the criteria specified in the Ambulatory Medication Refill Standing Order for eligibility, visits, laboratory, alerts and was sent to the requested pharmacy. Requested Prescriptions     Signed Prescriptions Disp Refills    insulin aspart (NOVOLOG FLEXPEN) 100 Units/mL Subcutaneous Solution Pen-injector 15 mL 0     Sig: Inject before meals three times daily via sliding scale; sugar 130-160, take 3 U; sugar 160-200, take 5 U; sugar 200-240, take 7 U; sugar 240-280, take 10 U, sugar > 280, take 12 U     Authorizing Provider:  Juan Luis Almodovar     Ordering User: Lauren Howard     Sent x1 with note to complete fasting labs

## 2023-10-18 ENCOUNTER — TELEPHONE (OUTPATIENT)
Dept: ENDOCRINOLOGY CLINIC | Facility: CLINIC | Age: 58
End: 2023-10-18

## 2023-10-18 ENCOUNTER — OFFICE VISIT (OUTPATIENT)
Dept: ENDOCRINOLOGY CLINIC | Facility: CLINIC | Age: 58
End: 2023-10-18

## 2023-10-18 VITALS
DIASTOLIC BLOOD PRESSURE: 73 MMHG | BODY MASS INDEX: 37.67 KG/M2 | WEIGHT: 240 LBS | HEART RATE: 87 BPM | SYSTOLIC BLOOD PRESSURE: 117 MMHG | HEIGHT: 67 IN

## 2023-10-18 DIAGNOSIS — E78.5 DYSLIPIDEMIA: ICD-10-CM

## 2023-10-18 DIAGNOSIS — E11.65 TYPE 2 DIABETES MELLITUS WITH HYPERGLYCEMIA, WITH LONG-TERM CURRENT USE OF INSULIN (HCC): Primary | ICD-10-CM

## 2023-10-18 DIAGNOSIS — Z79.4 TYPE 2 DIABETES MELLITUS WITH HYPERGLYCEMIA, WITH LONG-TERM CURRENT USE OF INSULIN (HCC): Primary | ICD-10-CM

## 2023-10-18 DIAGNOSIS — I10 ESSENTIAL HYPERTENSION: ICD-10-CM

## 2023-10-18 LAB
CARTRIDGE LOT#: ABNORMAL NUMERIC
GLUCOSE BLOOD: 181
HEMOGLOBIN A1C: 10.8 % (ref 4.3–5.6)
TEST STRIP LOT #: NORMAL NUMERIC

## 2023-10-18 PROCEDURE — 3046F HEMOGLOBIN A1C LEVEL >9.0%: CPT | Performed by: INTERNAL MEDICINE

## 2023-10-18 PROCEDURE — 3078F DIAST BP <80 MM HG: CPT | Performed by: INTERNAL MEDICINE

## 2023-10-18 PROCEDURE — 99244 OFF/OP CNSLTJ NEW/EST MOD 40: CPT | Performed by: INTERNAL MEDICINE

## 2023-10-18 PROCEDURE — 3074F SYST BP LT 130 MM HG: CPT | Performed by: INTERNAL MEDICINE

## 2023-10-18 PROCEDURE — 83036 HEMOGLOBIN GLYCOSYLATED A1C: CPT | Performed by: INTERNAL MEDICINE

## 2023-10-18 PROCEDURE — 3008F BODY MASS INDEX DOCD: CPT | Performed by: INTERNAL MEDICINE

## 2023-10-18 PROCEDURE — 82947 ASSAY GLUCOSE BLOOD QUANT: CPT | Performed by: INTERNAL MEDICINE

## 2023-10-18 RX ORDER — ACYCLOVIR 400 MG/1
1 TABLET ORAL
Qty: 9 EACH | Refills: 0 | Status: SHIPPED | OUTPATIENT
Start: 2023-10-18

## 2023-10-18 RX ORDER — TIRZEPATIDE 5 MG/.5ML
5 INJECTION, SOLUTION SUBCUTANEOUS WEEKLY
Qty: 6 ML | Refills: 0 | Status: SHIPPED | OUTPATIENT
Start: 2023-10-18 | End: 2024-01-16

## 2023-10-18 NOTE — H&P
Name: Carlos Mars  Date: 10/18/2023    Referring Physician: No ref. provider found    HISTORY OF PRESENT ILLNESS   Carlos Mars is a 56year old male who presents for evaluation and management of type 2 diabetes. He was diagnosed with diabetes about 17 years ago. He had     Blood Glucose Today: 181  HbA1C or glycohemoglobin is 10.8 today  Type 1 or Type 2?: Type 2   Medications for DM: Tresiba 55 units at bedtime; Novolog as per sliding scale   Checking many times per day  Episodes of hypoglycemia: none    Dietary compliance: he is trying to eat healthy, but snacks a lot during the day    Exercise: he is very active physically    Polyuria/polydipsia: none    Blurred vision: none    Flu Vaccine This Season: yes    Covid Vaccine: yes    REVIEW OF SYSTEMS  CV: Cardiovascular disease present: none         Hypertension present: at goal, on meds         Hyperlipidemia present: not at goal, on meds (3/2022)         Peripheral Vascular Disease present: none    : Nephropathy present: MAC: 295.5 (3/2022) Creatinine: 1.01 (5/03/23)     Neuro: Neuropathy present: none    Eyes: Diabetic retinopathy present: will follow up            Most recent visit to eye doctor in last 12 months: will follow up    Skin: Infection or ulceration: none    Osteoporosis/ Osteopenia: none Vitamin D: none    Thyroid disease: none TSH: none    Medications:     Current Outpatient Medications:     Tirzepatide (MOUNJARO) 5 MG/0.5ML Subcutaneous Solution Pen-injector, Inject 5 mg into the skin once a week., Disp: 6 mL, Rfl: 0    insulin aspart (NOVOLOG FLEXPEN) 100 Units/mL Subcutaneous Solution Pen-injector, Inject before meals three times daily via sliding scale; sugar 130-160, take 3 U; sugar 160-200, take 5 U; sugar 200-240, take 7 U; sugar 240-280, take 10 U, sugar > 280, take 12 U, Disp: 15 mL, Rfl: 0    gabapentin 100 MG Oral Cap, Take 1 capsule (100 mg total) by mouth 3 (three) times daily. , Disp: 90 capsule, Rfl: 0    cyclobenzaprine 5 MG Oral Tab, TAKE 1 TABLET BY MOUTH 3 TIMES DAILY AS NEEDED FOR MUSCLE SPASMS., Disp: 60 tablet, Rfl: 1    aspirin 81 MG Oral Tab EC, Take 1 tablet (81 mg total) by mouth As Directed., Disp: , Rfl:     Insulin Degludec 200 UNIT/ML Subcutaneous Solution Pen-injector, Inject 55 Units into the skin at bedtime. 40 units s.c in am (Patient taking differently: Inject 55 Units into the skin at bedtime.), Disp: 15 mL, Rfl: 5    atorvastatin 20 MG Oral Tab, Take 1 tablet (20 mg total) by mouth once daily. , Disp: 90 tablet, Rfl: 3    losartan 50 MG Oral Tab, Take 1 tablet (50 mg total) by mouth once daily. , Disp: 90 tablet, Rfl: 3    Insulin Pen Needle (BD PEN NEEDLE MICRO U/F) 32G X 6 MM Does not apply Misc, Dx- E11.9; use with [place] and Novolog, Disp: 200 each, Rfl: 5    clotrimazole 1 % External Cream, Apply 1 Application topically 2 (two) times daily. , Disp: 1 each, Rfl: 0    Meloxicam 15 MG Oral Tab, Take 1 tablet (15 mg total) by mouth in the morning., Disp: 30 tablet, Rfl: 3    Accu-Chek Multiclix Lancets Does not apply Misc, Check sugar AC and HS, Disp: 102 each, Rfl: 11    Glucose Blood (FREESTYLE LITE TEST) In Vitro Strip, Check sugar AC and HS; Dx- E11.9, Disp: 200 strip, Rfl: 11    BD PEN NEEDLE MINI U/F 31G X 5 MM Does not apply Misc, , Disp: , Rfl: 0    ACCU-CHEK MULTICLIX LANCETS Does not apply Misc, 1 each by Other route., Disp: , Rfl:     Continuous Blood Gluc Sensor (DEXCOM G7 SENSOR) Does not apply Misc, 1 each Every 10 days. , Disp: 9 each, Rfl: 0     Allergies:   No Known Allergies    Social History:   Social History     Socioeconomic History    Marital status:    Tobacco Use    Smoking status: Never    Smokeless tobacco: Never   Vaping Use    Vaping Use: Never used   Substance and Sexual Activity    Alcohol use: No    Drug use: No   Other Topics Concern    Caffeine Concern No       Medical History:   Past Medical History:   Diagnosis Date    Decorative tattoo     [facility]     Dr Latesha Elizondo Diabetes mellitus ([facility] Type 2    Essential hypertension     Hemorrhoids     History of varicocele 2011    repair -  in [place]    Hypercholesterolemia     Hyperlipidemia     Hypogonadism in male     testosterone level low, no supplement recommended    Oligospermia     Screening PSA (prostate specific antigen) 10/25/2012    Sleep apnea Na    Na       Surgical history:   Past Surgical History:   Procedure Laterality Date    COLONOSCOPY  Na    OTHER SURGICAL HISTORY  Na    Na         PHYSICAL EXAM   10/18/23  1352   BP: 117/73   Pulse: 87   Weight: 240 lb (108.9 kg)   Height: 5' 7\" (1.702 m)       General Appearance:  alert, well developed, in no acute distress  Eyes:  normal conjunctivae, sclera. , normal sclera and normal pupils  Neuro:  sensory grossly intact and motor grossly intact, normal monofilament exam  Psychiatric:  oriented to time, self, and place  Nutritional:  no abnormal weight gain or loss    Lab Data:   Lab Results   Component Value Date     (H) 03/01/2023    A1C 10.8 (A) 10/18/2023     Lab Results   Component Value Date     (H) 05/03/2023    BUN 18 05/03/2023    BUNCREA 17.8 05/03/2023    CREATSERUM 1.01 05/03/2023    ANIONGAP 7 05/03/2023    GFRNAA 101 03/16/2022    GFRAA 117 03/16/2022    CA 8.6 05/03/2023    OSMOCALC 296 (H) 05/03/2023    ALKPHO 119 (H) 05/03/2023    AST 27 05/03/2023    ALT 26 05/03/2023    ALKPHOS 61 09/05/2013    BILT 0.7 05/03/2023    TP 7.6 05/03/2023    ALB 3.4 05/03/2023    GLOBULIN 4.2 05/03/2023    AGRATIO 1.0 09/05/2013     (L) 05/03/2023    K 4.2 05/03/2023     05/03/2023    CO2 25.0 05/03/2023     Lab Results   Component Value Date    CHOLEST 237 (H) 03/16/2022    TRIG 81 03/16/2022    HDL 48 03/16/2022     (H) 03/16/2022    VLDL 16 03/16/2022    NONHDLC 189 (H) 03/16/2022     Lab Results   Component Value Date    MALBP 78.30 03/16/2022    CREUR 265.00 03/16/2022         ASSESSMENT/PLAN:  This is a 56year-old man here for evaluation and management of uncontrolled type 2 diabetes. We discussed the ABCs of DM.     1.) Hyperglycemia Management- We discussed the importance of glycemic control to prevent complications of diabetes. We discussed the importance of SBGM. I offered and provided patient education materials and offered a blood glucose log book. - ContinueTresiba 55 units at night; stop Novolog  - Start- Mounjaro 5mg qweekly for 3 months  - Prescribe the dexcom 7    2.) Management of Diabetic Complications- We discussed the complications of diabetes include retinopathy, neuropathy, nephropathy and cardiovascular disease. - Ophtho- will follow up  - Flu and Covid vaccine- up to date  - BP- at goal, on meds  - Lipids- will check in 6 weeks   - MAC- will check in 6 weeks  - CMP- will check in 6 weeks  - Neuropathy- none  - CAD- none    3.) Lifestyle Management for Diabetes- We discussed importance of a low CHO diet, and recommend 45gm per meal or 135gm per day. We discussed the importance of trying to follow a Mediterranean diet, with an emphasis on vegetables at every meal, with lots whole grains, and protein from either plant-based sources, or poultry and fish. - Diet- he will make improvements  - Exercise- he will time this appropriately    Return to clinic in 6 weeks     Prior to this encounter, I spent over 20 minutes with preparing for the visit, reviewing documents from other specialties as well as from PCP, and going over test results. During the face to face encounter, I spent an additional 30 minutes which were determined for history-taking, physical examination, and orientation regarding our services. Greater than 50% of the time was spent in counseling, anticipatory guidance, and coordination of care. Patient concerns were answered to the best of my knowledge. 10/18/2023  Zhanna Russell MD

## 2023-10-18 NOTE — TELEPHONE ENCOUNTER
Patient needs a 6 week follow-up visit with Dr. Biswas. Patient declined [place] office. No availability wmob. Please follow-up with patient. Thank you.

## 2023-10-18 NOTE — TELEPHONE ENCOUNTER
Spoke to pharmacist: Moy Diaz is covered by insurance with a $20 co-pay for one month supply  Spoke to patient to advise of above - patient stated understanding and requested RX for Dexcom G7 - per Dr. Keshia Coronado - ok to send  RX sent

## 2023-10-20 ENCOUNTER — TELEPHONE (OUTPATIENT)
Dept: ENDOCRINOLOGY CLINIC | Facility: CLINIC | Age: 58
End: 2023-10-20

## 2023-10-20 NOTE — TELEPHONE ENCOUNTER
Prior authorization      Continuous Blood Gluc Sensor (DEXCOM G7 SENSOR) Does not apply Misc, 1 each Every 10 days., Disp: 9 each, Rfl: 0    [id number]

## 2023-10-21 NOTE — TELEPHONE ENCOUNTER
Hi!  Please explain to patient that the [place] office is not that far from the [place] office. Thank you!

## 2023-10-24 NOTE — TELEPHONE ENCOUNTER
Called Jim/ Devin Roman at [phone], spoke with Janna, states patient picked up Decxom G7 sensor on 10/18/23.

## 2023-10-24 NOTE — TELEPHONE ENCOUNTER
Medication PA Requested: Dexcom G7 sensor                                                  CoverMyMeds Used:  Key: [id number]   Quantity: 9 each  Day Supply: 90 days  Si each Every 10 days as directed  DX Code:     E11.65

## 2024-01-10 ENCOUNTER — LAB ENCOUNTER (OUTPATIENT)
Dept: LAB | Facility: HOSPITAL | Age: 59
End: 2024-01-10
Attending: INTERNAL MEDICINE
Payer: COMMERCIAL

## 2024-01-10 DIAGNOSIS — E78.00 HYPERCHOLESTEREMIA: ICD-10-CM

## 2024-01-10 DIAGNOSIS — Z12.5 SCREENING PSA (PROSTATE SPECIFIC ANTIGEN): ICD-10-CM

## 2024-01-10 DIAGNOSIS — E11.65 TYPE 2 DIABETES MELLITUS WITH HYPERGLYCEMIA, WITH LONG-TERM CURRENT USE OF INSULIN (HCC): ICD-10-CM

## 2024-01-10 DIAGNOSIS — E78.5 DYSLIPIDEMIA: ICD-10-CM

## 2024-01-10 DIAGNOSIS — E11.9 TYPE 2 DIABETES MELLITUS WITHOUT COMPLICATION, UNSPECIFIED WHETHER LONG TERM INSULIN USE (HCC): ICD-10-CM

## 2024-01-10 DIAGNOSIS — Z79.4 TYPE 2 DIABETES MELLITUS WITH HYPERGLYCEMIA, WITH LONG-TERM CURRENT USE OF INSULIN (HCC): ICD-10-CM

## 2024-01-10 LAB
ALBUMIN SERPL-MCNC: 4.2 G/DL (ref 3.2–4.8)
ALBUMIN/GLOB SERPL: 1.1 {RATIO} (ref 1–2)
ALP LIVER SERPL-CCNC: 83 U/L
ALT SERPL-CCNC: 15 U/L
ANION GAP SERPL CALC-SCNC: 2 MMOL/L (ref 0–18)
AST SERPL-CCNC: 22 U/L (ref ?–34)
BILIRUB SERPL-MCNC: 1.3 MG/DL (ref 0.3–1.2)
BUN BLD-MCNC: 12 MG/DL (ref 9–23)
BUN/CREAT SERPL: 14.1 (ref 10–20)
CALCIUM BLD-MCNC: 9.8 MG/DL (ref 8.7–10.4)
CHLORIDE SERPL-SCNC: 105 MMOL/L (ref 98–112)
CHOLEST SERPL-MCNC: 216 MG/DL (ref ?–200)
CO2 SERPL-SCNC: 29 MMOL/L (ref 21–32)
COMPLEXED PSA SERPL-MCNC: 0.64 NG/ML (ref ?–4)
CREAT BLD-MCNC: 0.85 MG/DL
CREAT UR-SCNC: 166.2 MG/DL
EGFRCR SERPLBLD CKD-EPI 2021: 102 ML/MIN/1.73M2 (ref 60–?)
EST. AVERAGE GLUCOSE BLD GHB EST-MCNC: 192 MG/DL (ref 68–126)
FASTING PATIENT LIPID ANSWER: YES
FASTING STATUS PATIENT QL REPORTED: YES
GLOBULIN PLAS-MCNC: 3.9 G/DL (ref 2.8–4.4)
GLUCOSE BLD-MCNC: 118 MG/DL (ref 70–99)
HBA1C MFR BLD: 8.3 % (ref ?–5.7)
HDLC SERPL-MCNC: 48 MG/DL (ref 40–59)
LDLC SERPL CALC-MCNC: 154 MG/DL (ref ?–100)
MICROALBUMIN UR-MCNC: 71.9 MG/DL
MICROALBUMIN/CREAT 24H UR-RTO: 432.6 UG/MG (ref ?–30)
NONHDLC SERPL-MCNC: 168 MG/DL (ref ?–130)
OSMOLALITY SERPL CALC.SUM OF ELEC: 283 MOSM/KG (ref 275–295)
POTASSIUM SERPL-SCNC: 4.3 MMOL/L (ref 3.5–5.1)
PROT SERPL-MCNC: 8.1 G/DL (ref 5.7–8.2)
SODIUM SERPL-SCNC: 136 MMOL/L (ref 136–145)
TRIGL SERPL-MCNC: 81 MG/DL (ref 30–149)
TSI SER-ACNC: 0.82 MIU/ML (ref 0.55–4.78)
VLDLC SERPL CALC-MCNC: 15 MG/DL (ref 0–30)

## 2024-01-10 PROCEDURE — 82043 UR ALBUMIN QUANTITATIVE: CPT

## 2024-01-10 PROCEDURE — 80061 LIPID PANEL: CPT

## 2024-01-10 PROCEDURE — 80053 COMPREHEN METABOLIC PANEL: CPT

## 2024-01-10 PROCEDURE — 36415 COLL VENOUS BLD VENIPUNCTURE: CPT

## 2024-01-10 PROCEDURE — 83036 HEMOGLOBIN GLYCOSYLATED A1C: CPT

## 2024-01-10 PROCEDURE — 84443 ASSAY THYROID STIM HORMONE: CPT

## 2024-01-10 PROCEDURE — 82570 ASSAY OF URINE CREATININE: CPT

## 2024-01-11 NOTE — PROGRESS NOTES
Please note that the following visit was completed using two-way, real-time interactive audio and video communication.  This has been done in good stephanie to provide continuity of care in the best interest of the provider-patient relationship, due to the ongoing public health crisis/national emergency and because of restrictions of visitation.  There are limitations of this visit as no physical exam could be performed.  Every conscious effort was taken to allow for sufficient and adequate time.  This billing was spent on reviewing labs, medications, radiology tests and decision making.  Appropriate medical decision-making and tests are ordered as detailed in the plan of care above.    Name: Nic Rajput  Date: 1/13/24    Referring Physician: No ref. provider found    HISTORY OF PRESENT ILLNESS   Nic Rajput is a 58 year old male who presents for evaluation and management of type 2 diabetes. He was diagnosed with diabetes about 17 years ago. At the last visit, I had started him on Mounjaro and stopped his Novolog.     Blood Glucose Today: VV  HbA1C or glycohemoglobin is 8.3 on 1/10/24 (and it was 10.8 on 10/18/23)  Type 1 or Type 2?: Type 2   Medications for DM: Tresiba 55 units at bedtime; Mounjaro 5mg qweekly  Checking many times per day  Blood sugars are getting better  Episodes of hypoglycemia: none    Dietary compliance: he is trying to eat healthy, but snacks a lot during the day; He is finding that he does not have cravings as much as before.    Exercise: he is very active physically    Polyuria/polydipsia: none    Blurred vision: none    Flu Vaccine This Season: yes    Covid Vaccine: yes    REVIEW OF SYSTEMS  CV: Cardiovascular disease present: none         Hypertension present: at goal, on meds         Hyperlipidemia present: not at goal, on meds (1/10/24)         Peripheral Vascular Disease present: none    : Nephropathy present: MAC: 432.6 (1/10/24) Creatinine: 0.85     Neuro: Neuropathy present:  none    Eyes: Diabetic retinopathy present: unknown            Most recent visit to eye doctor in last 12 months: has an appt scheduled    Skin: Infection or ulceration: none    Osteoporosis/ Osteopenia: none Vitamin D: none    Thyroid disease: none TSH: none    Medications:     Current Outpatient Medications:     Tirzepatide (MOUNJARO) 5 MG/0.5ML Subcutaneous Solution Pen-injector, Inject 5 mg into the skin once a week., Disp: 6 mL, Rfl: 0    Continuous Blood Gluc Sensor (DEXCOM G7 SENSOR) Does not apply Misc, 1 each Every 10 days., Disp: 9 each, Rfl: 0    Continuous Blood Gluc Sensor (DEXCOM G7 SENSOR) Does not apply Misc, 1 each Every 10 days., Disp: 9 each, Rfl: 0    insulin aspart (NOVOLOG FLEXPEN) 100 Units/mL Subcutaneous Solution Pen-injector, Inject before meals three times daily via sliding scale; sugar 130-160, take 3 U; sugar 160-200, take 5 U; sugar 200-240, take 7 U; sugar 240-280, take 10 U, sugar > 280, take 12 U, Disp: 15 mL, Rfl: 0    gabapentin 100 MG Oral Cap, Take 1 capsule (100 mg total) by mouth 3 (three) times daily., Disp: 90 capsule, Rfl: 0    cyclobenzaprine 5 MG Oral Tab, TAKE 1 TABLET BY MOUTH 3 TIMES DAILY AS NEEDED FOR MUSCLE SPASMS., Disp: 60 tablet, Rfl: 1    aspirin 81 MG Oral Tab EC, Take 1 tablet (81 mg total) by mouth As Directed., Disp: , Rfl:     Insulin Degludec 200 UNIT/ML Subcutaneous Solution Pen-injector, Inject 55 Units into the skin at bedtime. 40 units s.c in am (Patient taking differently: Inject 55 Units into the skin at bedtime.), Disp: 15 mL, Rfl: 5    atorvastatin 20 MG Oral Tab, Take 1 tablet (20 mg total) by mouth once daily., Disp: 90 tablet, Rfl: 3    losartan 50 MG Oral Tab, Take 1 tablet (50 mg total) by mouth once daily., Disp: 90 tablet, Rfl: 3    Insulin Pen Needle (BD PEN NEEDLE MICRO U/F) 32G X 6 MM Does not apply Misc, Dx- E11.9; use with Tresiba and Novolog, Disp: 200 each, Rfl: 5    clotrimazole 1 % External Cream, Apply 1 Application topically 2  (two) times daily., Disp: 1 each, Rfl: 0    Meloxicam 15 MG Oral Tab, Take 1 tablet (15 mg total) by mouth in the morning., Disp: 30 tablet, Rfl: 3    Accu-Chek Multiclix Lancets Does not apply Misc, Check sugar AC and HS, Disp: 102 each, Rfl: 11    Glucose Blood (FREESTYLE LITE TEST) In Vitro Strip, Check sugar AC and HS; Dx- E11.9, Disp: 200 strip, Rfl: 11    BD PEN NEEDLE MINI U/F 31G X 5 MM Does not apply Misc, , Disp: , Rfl: 0    ACCU-CHEK MULTICLIX LANCETS Does not apply Misc, 1 each by Other route., Disp: , Rfl:      Allergies:   No Known Allergies    Social History:   Social History     Socioeconomic History    Marital status:    Tobacco Use    Smoking status: Never    Smokeless tobacco: Never   Vaping Use    Vaping Use: Never used   Substance and Sexual Activity    Alcohol use: No    Drug use: No   Other Topics Concern    Caffeine Concern No       Medical History:   Past Medical History:   Diagnosis Date    Decorative tattoo     Diabetes (HCC)     Dr Marinelli    Diabetes mellitus (HCC) Type 2    Essential hypertension     Hemorrhoids     History of varicocele 2011    repair -  in [place]    Hypercholesterolemia     Hyperlipidemia     Hypogonadism in male     testosterone level low, no supplement recommended    Oligospermia     Screening PSA (prostate specific antigen) 10/25/2012    Sleep apnea Na    Na       Surgical history:   Past Surgical History:   Procedure Laterality Date    COLONOSCOPY  Na    OTHER SURGICAL HISTORY  Na    Na         PHYSICAL EXAM  Constitutional: he is not in acute distress, normal appearance, not ill appearing  HENT: Head: atraumatic, no obvious fullness apparent in the throat area  EYE: No scleral icterus; no eye discharge  Pulmonary:  Speaking in full sentences, pulmonary effort is normal  Neurological: Alert and oriented to person, place, and time  Psychiatric: Behavior is normal, normal affect  Skin: No visible lesions  Extremities: no obvious extremity swelling, no  lesions        Lab Data:   Lab Results   Component Value Date     (H) 01/10/2024    A1C 8.3 (H) 01/10/2024     Lab Results   Component Value Date     (H) 01/10/2024    BUN 12 01/10/2024    BUNCREA 14.1 01/10/2024    CREATSERUM 0.85 01/10/2024    ANIONGAP 2 01/10/2024    GFRNAA 101 03/16/2022    GFRAA 117 03/16/2022    CA 9.8 01/10/2024    OSMOCALC 283 01/10/2024    ALKPHO 83 01/10/2024    AST 22 01/10/2024    ALT 15 01/10/2024    ALKPHOS 61 09/05/2013    BILT 1.3 (H) 01/10/2024    TP 8.1 01/10/2024    ALB 4.2 01/10/2024    GLOBULIN 3.9 01/10/2024    AGRATIO 1.0 09/05/2013     01/10/2024    K 4.3 01/10/2024     01/10/2024    CO2 29.0 01/10/2024     Lab Results   Component Value Date    CHOLEST 216 (H) 01/10/2024    TRIG 81 01/10/2024    HDL 48 01/10/2024     (H) 01/10/2024    VLDL 15 01/10/2024    NONHDLC 168 (H) 01/10/2024     Lab Results   Component Value Date    MALBP 71.90 01/10/2024    CREUR 166.20 01/10/2024         ASSESSMENT/PLAN:  This is a 58 year-old man here for evaluation and management of uncontrolled type 2 diabetes. We discussed the ABCs of DM.     1.) Hyperglycemia Management- We discussed the importance of glycemic control to prevent complications of diabetes. We discussed the importance of SBGM. I offered and provided patient education materials and offered a blood glucose log book.   - ContinueTresiba 55 units at night;   - Increase Mounjaro from 5mg to 7.5mg qweekly for 30 days with refills; He will let me know when he would like to increase the dose  - He will continue to check the blood sugars fasting and two hours after biggest meal    2.) Management of Diabetic Complications- We discussed the complications of diabetes include retinopathy, neuropathy, nephropathy and cardiovascular disease.   - Ophtho- will follow up  - Flu and Covid vaccine- up to date  - BP- at goal, on meds  - Lipids- not at goal, inc atorvastatin to 40mg and recheck in 3 months  - MAC- not  at goal, will check BP and send to me in 1 week and will check MAC in 3 months  - CMP- will check in 3 months  - Neuropathy- none  - CAD- none    3.) Lifestyle Management for Diabetes- We discussed importance of a low CHO diet, and recommend 45gm per meal or 135gm per day. We discussed the importance of trying to follow a Mediterranean diet, with an emphasis on vegetables at every meal, with lots whole grains, and protein from either plant-based sources, or poultry and fish.   - Diet- he will make improvements  - Exercise- he will time this appropriately    Return to clinic in 3 months     Prior to this encounter, I spent over 15 minutes with preparing for the visit, including reviewing documents from other specialties as well as from PCP and going over test results. During the face to face encounter, I spent an additional 15 minutes which were determined for follow-up. Greater than 50% of the time was spent in counseling, anticipatory guidance, and coordination of care. Patient concerns were answered to the best of my knowledge.         1/13/24  Zhanna Biswas MD

## 2024-01-13 ENCOUNTER — TELEMEDICINE (OUTPATIENT)
Dept: ENDOCRINOLOGY CLINIC | Facility: CLINIC | Age: 59
End: 2024-01-13
Payer: COMMERCIAL

## 2024-01-13 DIAGNOSIS — Z79.4 TYPE 2 DIABETES MELLITUS WITH HYPERGLYCEMIA, WITH LONG-TERM CURRENT USE OF INSULIN (HCC): Primary | ICD-10-CM

## 2024-01-13 DIAGNOSIS — I10 ESSENTIAL HYPERTENSION: ICD-10-CM

## 2024-01-13 DIAGNOSIS — E78.5 DYSLIPIDEMIA: ICD-10-CM

## 2024-01-13 DIAGNOSIS — E11.65 TYPE 2 DIABETES MELLITUS WITH HYPERGLYCEMIA, WITH LONG-TERM CURRENT USE OF INSULIN (HCC): Primary | ICD-10-CM

## 2024-01-13 PROCEDURE — 99214 OFFICE O/P EST MOD 30 MIN: CPT | Performed by: INTERNAL MEDICINE

## 2024-01-13 RX ORDER — ATORVASTATIN CALCIUM 40 MG/1
40 TABLET, FILM COATED ORAL NIGHTLY
Qty: 90 TABLET | Refills: 0 | Status: SHIPPED | OUTPATIENT
Start: 2024-01-13 | End: 2024-04-12

## 2024-01-13 RX ORDER — TIRZEPATIDE 7.5 MG/.5ML
7.5 INJECTION, SOLUTION SUBCUTANEOUS WEEKLY
Qty: 2 ML | Refills: 2 | Status: SHIPPED | OUTPATIENT
Start: 2024-01-13 | End: 2024-02-12

## 2024-01-18 DIAGNOSIS — E11.65 TYPE 2 DIABETES MELLITUS WITH HYPERGLYCEMIA, WITH LONG-TERM CURRENT USE OF INSULIN (HCC): ICD-10-CM

## 2024-01-18 DIAGNOSIS — Z79.4 TYPE 2 DIABETES MELLITUS WITH HYPERGLYCEMIA, WITH LONG-TERM CURRENT USE OF INSULIN (HCC): ICD-10-CM

## 2024-01-20 ENCOUNTER — PATIENT MESSAGE (OUTPATIENT)
Dept: ENDOCRINOLOGY CLINIC | Facility: CLINIC | Age: 59
End: 2024-01-20

## 2024-01-22 RX ORDER — ACYCLOVIR 400 MG/1
TABLET ORAL
Qty: 9 EACH | Refills: 1 | Status: SHIPPED | OUTPATIENT
Start: 2024-01-22

## 2024-01-22 NOTE — TELEPHONE ENCOUNTER
From: Nic Rajput  To: Zhanna Biswas  Sent: 1/20/2024 4:01 PM CST  Subject: Dexcom G7 Sensor     Good afternoon Dr Biswas,  Its been 2 days since my pharmacy has sent a request for a new script for my Dexcom G7 sensors. I'm just following up because when I called them earlier, they stated that they were still waiting to hear back from you/ your office.  I've got a day and a half left with this last sensor I'm wearing. Hope to hear something soon!    Thank you,    Nic Rajput

## 2024-04-09 DIAGNOSIS — E11.65 TYPE 2 DIABETES MELLITUS WITH HYPERGLYCEMIA, WITH LONG-TERM CURRENT USE OF INSULIN (HCC): ICD-10-CM

## 2024-04-09 DIAGNOSIS — Z79.4 TYPE 2 DIABETES MELLITUS WITH HYPERGLYCEMIA, WITH LONG-TERM CURRENT USE OF INSULIN (HCC): ICD-10-CM

## 2024-04-09 RX ORDER — TIRZEPATIDE 7.5 MG/.5ML
7.5 INJECTION, SOLUTION SUBCUTANEOUS WEEKLY
Qty: 2 ML | Refills: 0 | Status: SHIPPED | OUTPATIENT
Start: 2024-04-09

## 2024-04-09 NOTE — TELEPHONE ENCOUNTER
Endocrine Refill protocol for oral and injectable diabetic medications    Protocol Criteria:    -Appointment with Endocrinology completed in the last 6 months or scheduled in the next 3 months    -A1c result <8.5% in the past 6 months      Verify the above has been completed or scheduled in the appropriate timeline. If so can send a 90 day supply with 1 refill.     Last completed office visit:01/13/24  Next scheduled Follow up: 04/10/24  Last A1c result: 8.3%

## 2024-04-15 DIAGNOSIS — Z79.4 TYPE 2 DIABETES MELLITUS WITH HYPERGLYCEMIA, WITH LONG-TERM CURRENT USE OF INSULIN (HCC): ICD-10-CM

## 2024-04-15 DIAGNOSIS — E11.65 TYPE 2 DIABETES MELLITUS WITH HYPERGLYCEMIA, WITH LONG-TERM CURRENT USE OF INSULIN (HCC): ICD-10-CM

## 2024-04-16 DIAGNOSIS — E11.65 TYPE 2 DIABETES MELLITUS WITH HYPERGLYCEMIA, WITH LONG-TERM CURRENT USE OF INSULIN (HCC): ICD-10-CM

## 2024-04-16 DIAGNOSIS — Z79.4 TYPE 2 DIABETES MELLITUS WITH HYPERGLYCEMIA, WITH LONG-TERM CURRENT USE OF INSULIN (HCC): ICD-10-CM

## 2024-04-16 DIAGNOSIS — E78.5 DYSLIPIDEMIA: ICD-10-CM

## 2024-04-16 NOTE — TELEPHONE ENCOUNTER
Endocrine Refill protocol for oral and injectable diabetic medications    Protocol Criteria:    -Appointment with Endocrinology completed in the last 6 months or scheduled in the next 3 months    -A1c result <8.5% in the past 6 months      Verify the above has been completed or scheduled in the appropriate timeline. If so can send a 90 day supply with 1 refill.     Last completed office visit:01/13/24  Next scheduled Follow up: 06/05/24  Last A1c result: 8.3%

## 2024-04-17 NOTE — TELEPHONE ENCOUNTER
Endocrine refill protocol for lipid lowering medications    Protocol Criteria:  Appointment with Endocrinology completed in the last 6 months or scheduled in the next 3 months     Verify appointment has been completed or scheduled in the appropriate timeline. If so can send a 90 day supply with 1 refill.   Lipid panel must have been completed in the last 12 months   ALT result <80  LDL result <130    Cholesterol: 216, done on 1/10/2024.  HDL Cholesterol: 48, done on 1/10/2024.  LDL Cholesterol: 154, done on 1/10/2024.  TriGlycerides 81, done on 1/10/2024.     Last completed office visit:01/13/24  Next scheduled Follow up: 06/05/24  Last Lipid panel date: 01/10/24  Last ALT result: 15  Last LDL result; 154

## 2024-04-19 RX ORDER — TIRZEPATIDE 7.5 MG/.5ML
7.5 INJECTION, SOLUTION SUBCUTANEOUS WEEKLY
Qty: 2 ML | Refills: 3 | Status: SHIPPED | OUTPATIENT
Start: 2024-04-19

## 2024-04-19 RX ORDER — ATORVASTATIN CALCIUM 40 MG/1
40 TABLET, FILM COATED ORAL NIGHTLY
Qty: 90 TABLET | Refills: 1 | Status: SHIPPED | OUTPATIENT
Start: 2024-04-19

## 2024-05-14 DIAGNOSIS — E11.65 TYPE 2 DIABETES MELLITUS WITH HYPERGLYCEMIA, WITH LONG-TERM CURRENT USE OF INSULIN (HCC): ICD-10-CM

## 2024-05-14 DIAGNOSIS — Z79.4 TYPE 2 DIABETES MELLITUS WITH HYPERGLYCEMIA, WITH LONG-TERM CURRENT USE OF INSULIN (HCC): ICD-10-CM

## 2024-05-15 RX ORDER — TIRZEPATIDE 7.5 MG/.5ML
7.5 INJECTION, SOLUTION SUBCUTANEOUS WEEKLY
Qty: 2 ML | Refills: 3 | Status: SHIPPED | OUTPATIENT
Start: 2024-05-15

## 2024-08-01 DIAGNOSIS — Z79.4 TYPE 2 DIABETES MELLITUS WITH HYPERGLYCEMIA, WITH LONG-TERM CURRENT USE OF INSULIN (HCC): ICD-10-CM

## 2024-08-01 DIAGNOSIS — E11.65 TYPE 2 DIABETES MELLITUS WITH HYPERGLYCEMIA, WITH LONG-TERM CURRENT USE OF INSULIN (HCC): ICD-10-CM

## 2024-08-01 NOTE — TELEPHONE ENCOUNTER
Endocrine Refill protocol for CGM supplies     Protocol Criteria:pass  Appointment with Endocrinology completed in the last 12 months or scheduled in the next 6 months     Verify appointment has been completed or scheduled in the appropriate timeline. If so can send a 90 day supply with 1 refill.     Last completed office visit:1/13/2024 Zhanna Biswas MD   Next scheduled Follow up: 09/14/24

## 2024-08-02 RX ORDER — ACYCLOVIR 400 MG/1
TABLET ORAL
Qty: 9 EACH | Refills: 1 | Status: SHIPPED | OUTPATIENT
Start: 2024-08-02

## 2024-08-02 RX ORDER — ACYCLOVIR 400 MG/1
1 TABLET ORAL
Qty: 9 EACH | Refills: 1 | OUTPATIENT
Start: 2024-08-02

## 2024-08-02 NOTE — TELEPHONE ENCOUNTER
nelly Rosales review refill request for Mounjaro and send if appropriate. Order has been pended. Thank you      Endocrine Refill protocol for oral and injectable diabetic medications    Protocol Criteria: Fail. A1C > 6 months ago.    -Appointment with Endocrinology completed in the last 6 months or scheduled in the next 3 months    -A1c result below 8.5% in the past 6 months      Verify the above has been completed or scheduled in the appropriate timeline. If so can send a 90 day supply with 1 refill.     Last completed office visit: 1/13/2024 Zhanna Biswas MD   Next scheduled Follow up: 9/14/24     Last A1c result: 1/10/24: 8.3

## 2024-08-02 NOTE — TELEPHONE ENCOUNTER
Endocrine Refill protocol for Glucose testing supplies       Protocol Criteria: PASS. RX refill per protocol.  Appointment with Endocrinology completed in the last 12 months or scheduled in the next 6 months     Verify appointment has been completed or scheduled in the appropriate timeline. If so can send a 90 day supply with 1 refill.        Last completed office visit: 1/13/2024 Zhanna Biswas MD   Next scheduled Follow up: 9/14/2024

## 2024-08-04 RX ORDER — TIRZEPATIDE 7.5 MG/.5ML
7.5 INJECTION, SOLUTION SUBCUTANEOUS WEEKLY
Qty: 2 ML | Refills: 3 | Status: SHIPPED | OUTPATIENT
Start: 2024-08-04

## 2024-08-22 ENCOUNTER — HOSPITAL ENCOUNTER (OUTPATIENT)
Age: 59
Discharge: HOME | End: 2024-08-22
Payer: COMMERCIAL

## 2024-08-22 VITALS
SYSTOLIC BLOOD PRESSURE: 148 MMHG | HEART RATE: 88 BPM | OXYGEN SATURATION: 100 % | TEMPERATURE: 99 F | DIASTOLIC BLOOD PRESSURE: 80 MMHG | RESPIRATION RATE: 20 BRPM

## 2024-08-22 DIAGNOSIS — Z20.822 ENCOUNTER FOR LABORATORY TESTING FOR COVID-19 VIRUS: ICD-10-CM

## 2024-08-22 DIAGNOSIS — J02.0 STREPTOCOCCAL SORE THROAT: ICD-10-CM

## 2024-08-22 DIAGNOSIS — U07.1 COVID-19 VIRUS DETECTED: Primary | ICD-10-CM

## 2024-08-22 LAB
S PYO AG THROAT QL: POSITIVE
SARS-COV-2 RNA RESP QL NAA+PROBE: DETECTED

## 2024-08-22 PROCEDURE — 99214 OFFICE O/P EST MOD 30 MIN: CPT | Performed by: NURSE PRACTITIONER

## 2024-08-22 PROCEDURE — 87880 STREP A ASSAY W/OPTIC: CPT | Performed by: NURSE PRACTITIONER

## 2024-08-22 PROCEDURE — U0002 COVID-19 LAB TEST NON-CDC: HCPCS | Performed by: NURSE PRACTITIONER

## 2024-08-22 RX ORDER — PENICILLIN V POTASSIUM 500 MG/1
500 TABLET, FILM COATED ORAL 2 TIMES DAILY
Qty: 20 TABLET | Refills: 0 | Status: SHIPPED | OUTPATIENT
Start: 2024-08-22 | End: 2024-09-01

## 2024-08-22 NOTE — ED PROVIDER NOTES
Patient Seen in: [facility]      History     Chief Complaint   Patient presents with    Cough    Headache     Stated Complaint: cough    Subjective:   Well-appearing 58-year-old male with diabetes, essential hypertension, hyperlipidemia and sleep apnea presents with complaints of a nonproductive cough, fatigue, intermittent headaches, scratchy throat and a runny nose since this past Tuesday.  Patient communicates concerns about possible COVID-19.  No over-the-counter medications have been taken for symptoms.  Patient denies fever or chills.  Patient denies chest pain or shortness of breath.                  Objective:   Past Medical History:    Decorative tattoo    Diabetes (HCC)    Dr Marinelli    Diabetes mellitus (HCC)    Essential hypertension    Hemorrhoids    History of varicocele    repair -  in [place]    Hypercholesterolemia    Hyperlipidemia    Hypogonadism in male    testosterone level low, no supplement recommended    Oligospermia    Screening PSA (prostate specific antigen)    Sleep apnea    Na              Past Surgical History:   Procedure Laterality Date    Colonoscopy  Na    Other surgical history  Na    Na                Social History     Socioeconomic History    Marital status:    Tobacco Use    Smoking status: Never    Smokeless tobacco: Never   Vaping Use    Vaping status: Never Used   Substance and Sexual Activity    Alcohol use: No    Drug use: No   Other Topics Concern    Caffeine Concern No     Social Determinants of Health      [facility]    Social Connections    [facility]    Housing Stability              Review of Systems    Positive for stated Chief Complaint: Cough and Headache    Other systems are as noted in HPI.  Constitutional and vital signs reviewed.      All other systems reviewed and negative except as noted above.    Physical  Exam     ED Triage Vitals [08/22/24 0849]   /80   Pulse 88   Resp 20   Temp 98.6 °F (37 °C)   Temp src Temporal   SpO2 100 %   O2 Device None (Room air)       Current Vitals:   Vital Signs  BP: 148/80  Pulse: 88  Resp: 20  Temp: 98.6 °F (37 °C)  Temp src: Temporal    Oxygen Therapy  SpO2: 100 %  O2 Device: None (Room air)      Physical Exam  VS: Vital signs reviewed. 02 saturation within normal limits for this patient.    General: Patient is awake and alert, oriented to person, place and time. Pt appears non-toxic.     HEENT: Head is normocephalic, atraumatic.  Nonicteric sclera, no conjunctival injection. No facial droop or slurred speech. No oral lesions or pallor. Mucous membranes moist. Left and right tympanic membranes normal.      Right Ear: Ear canal and external ear normal.      Left Ear: Ear canal and external ear normal.      Nose: Congestion present. No rhinorrhea.      Mouth/Throat:      Lips: Pink.      Mouth: Mucous membranes are moist.      Pharynx: Oropharynx is clear.     Neck: No cervical lymphadenopathy. Supple. Normal ROM.    Heart: Regular rate and rhythm, normal S1, normal S2.    Lungs: Clear to auscultation. Good inspiratory effort. + Airway entry bilaterally without wheezes, rhonchi or crackles. No accessory muscle use or tachypnea.    Extremities: No focal swelling or tenderness. Capillary refill noted.     Skin: Warm, dry and normal in color.     Psychiatric: Normal affect, judgement normal, insight normal.     CNS: Moves all 4 extremities. Interacts appropriately. No gait abnormality. Memory normal.        ED Course     Labs Reviewed   POCT RAPID STREP - Abnormal; Notable for the following components:       Result Value    POCT Rapid Strep Positive (*)     All other components within normal limits   RAPID SARS-COV-2 BY PCR - Abnormal; Notable for the following components:    Rapid SARS-CoV-2 by PCR Detected (*)     All other components within normal limits     MDM   Medical Decision  Making  Well-appearing.  Rapid SARS-CoV-2 positive.  Rapid strep positive.  O2 saturation 100%.  No respiratory distress.    COVID-19 counseling performed.  I discussed quarantine precautions.  I reviewed the signs and symptoms of COVID-19.  I discussed when to seek higher level of care and social isolation.    Patient has been advised that they should continue to self isolate and use infection control measures (e.g. wear mask, isolate, social distance, avoid sharing personal items, clean and disinfect high touch surfaces, and frequent handwashing).     Prescription for penicillin 500 mg twice daily x 10 days was sent to pharmacy on file for treatment of streptococcal sore throat.  I discussed over-the-counter acetaminophen as needed for pain.  Paxlovid was discussed with patient, including viral rebound, patient communicated that he would like to proceed with Paxlovid for treatment of COVID-19.  I discussed with patient that he needs to stop atorvastatin while taking Paxlovid.  Return if any concerns.      Problems Addressed:  COVID-19 virus detected: acute illness or injury  Encounter for laboratory testing for COVID-19 virus: acute illness or injury  Streptococcal sore throat: acute illness or injury    Amount and/or Complexity of Data Reviewed  External Data Reviewed: labs.     Details: CMP from January 10, 2024 reviewed  Labs: ordered. Decision-making details documented in ED Course.    Risk  OTC drugs.  Prescription drug management.        Disposition and Plan     Clinical Impression:  1. COVID-19 virus detected    2. Encounter for laboratory testing for COVID-19 virus    3. Streptococcal sore throat         Disposition:  Discharge  8/22/2024  9:45 am    Follow-up:  Mynor Last MD  [address] [place] [id number]  [phone]    In 1 week  As needed          Medications Prescribed:  Discharge Medication List as of 8/22/2024  9:46 AM        START taking these medications    Details   penicillin  v potassium 500 MG Oral Tab Take 1 tablet (500 mg total) by mouth in the morning and 1 tablet (500 mg total) before bedtime. Do all this for 10 days., Normal, Disp-20 tablet, R-0      nirmatrelvir-ritonavir 300-100 MG Oral Tablet Therapy Pack Take two nirmatrelvir tablets (300mg) with one ritonavir tablet (100mg) together twice daily for 5 days., Normal, Disp-30 tablet, R-0

## 2024-08-22 NOTE — ED INITIAL ASSESSMENT (HPI)
Pt here with cough, fatigue, intermittent headache, scratchy thraot and runny nose since Tuesday.

## 2024-09-14 ENCOUNTER — OFFICE VISIT (OUTPATIENT)
Dept: ENDOCRINOLOGY CLINIC | Facility: CLINIC | Age: 59
End: 2024-09-14
Payer: COMMERCIAL

## 2024-09-14 VITALS
HEIGHT: 67 IN | BODY MASS INDEX: 36.67 KG/M2 | SYSTOLIC BLOOD PRESSURE: 115 MMHG | DIASTOLIC BLOOD PRESSURE: 77 MMHG | WEIGHT: 233.63 LBS | HEART RATE: 88 BPM

## 2024-09-14 DIAGNOSIS — E11.65 TYPE 2 DIABETES MELLITUS WITH HYPERGLYCEMIA, WITH LONG-TERM CURRENT USE OF INSULIN (HCC): Primary | ICD-10-CM

## 2024-09-14 DIAGNOSIS — I10 ESSENTIAL HYPERTENSION: ICD-10-CM

## 2024-09-14 DIAGNOSIS — E78.5 DYSLIPIDEMIA: ICD-10-CM

## 2024-09-14 DIAGNOSIS — Z79.4 TYPE 2 DIABETES MELLITUS WITH HYPERGLYCEMIA, WITH LONG-TERM CURRENT USE OF INSULIN (HCC): Primary | ICD-10-CM

## 2024-09-14 LAB
CARTRIDGE EXPIRATION DATE: ABNORMAL DATE
GLUCOSE BLOOD: 111
HEMOGLOBIN A1C: 7.9 % (ref 4.3–5.6)
TEST STRIP EXPIRATION DATE: NORMAL DATE
TEST STRIP LOT #: NORMAL NUMERIC

## 2024-09-14 PROCEDURE — 99214 OFFICE O/P EST MOD 30 MIN: CPT | Performed by: INTERNAL MEDICINE

## 2024-09-14 PROCEDURE — 3078F DIAST BP <80 MM HG: CPT | Performed by: INTERNAL MEDICINE

## 2024-09-14 PROCEDURE — 83036 HEMOGLOBIN GLYCOSYLATED A1C: CPT | Performed by: INTERNAL MEDICINE

## 2024-09-14 PROCEDURE — 3074F SYST BP LT 130 MM HG: CPT | Performed by: INTERNAL MEDICINE

## 2024-09-14 PROCEDURE — 82947 ASSAY GLUCOSE BLOOD QUANT: CPT | Performed by: INTERNAL MEDICINE

## 2024-09-14 PROCEDURE — 3008F BODY MASS INDEX DOCD: CPT | Performed by: INTERNAL MEDICINE

## 2024-09-14 PROCEDURE — 3060F POS MICROALBUMINURIA REV: CPT | Performed by: INTERNAL MEDICINE

## 2024-09-14 PROCEDURE — 3051F HG A1C>EQUAL 7.0%<8.0%: CPT | Performed by: INTERNAL MEDICINE

## 2024-09-14 PROCEDURE — 3052F HG A1C>EQUAL 8.0%<EQUAL 9.0%: CPT | Performed by: INTERNAL MEDICINE

## 2024-09-14 RX ORDER — TIRZEPATIDE 10 MG/.5ML
10 INJECTION, SOLUTION SUBCUTANEOUS WEEKLY
Qty: 6 ML | Refills: 1 | Status: SHIPPED | OUTPATIENT
Start: 2024-09-14 | End: 2024-12-13

## 2024-09-14 NOTE — PROGRESS NOTES
Name: Nic Rajput  Date: 9/14/24    Referring Physician: No ref. provider found    HISTORY OF PRESENT ILLNESS   Nic Rajput is a 58 year old male who presents for evaluation and management of type 2 diabetes. He was diagnosed with diabetes about 17 years ago. A few visits ago, I had started him on Mounjaro and stopped his Novolog. At the last visit, I had increased his Mounjaro dose. He is doing really well.     Blood Glucose Today: 111  HbA1C or glycohemoglobin is 7.9 today (and it was 8.3 on 1/10/24 and it was 10.8 on 10/18/23)  Type 1 or Type 2?: Type 2   Medications for DM: Tresiba 55 units at bedtime; Mounjaro 7.5mg qweekly  Checking many times per day  Fasting-   2-hours after eat-   Blood sugars are getting better  Episodes of hypoglycemia: none    Dietary compliance: he is eating very healthy    Exercise: he is very active physically    Polyuria/polydipsia: none    Blurred vision: none    Flu Vaccine This Season: yes    Covid Vaccine: yes    REVIEW OF SYSTEMS  CV: Cardiovascular disease present: none         Hypertension present: at goal, on meds         Hyperlipidemia present: not at goal, on meds (1/10/24)         Peripheral Vascular Disease present: none    : Nephropathy present: MAC: 432.6 (1/10/24) Creatinine: 0.85     Neuro: Neuropathy present: none    Eyes: Diabetic retinopathy present: unknown            Most recent visit to eye doctor in last 12 months: has an appt scheduled    Skin: Infection or ulceration: none    Osteoporosis/ Osteopenia: none Vitamin D: none    Thyroid disease: none TSH: 0.824 (1/10/24)    Medications:     Current Outpatient Medications:     Tirzepatide (MOUNJARO) 7.5 MG/0.5ML Subcutaneous Solution Pen-injector, Inject 7.5 mg into the skin once a week., Disp: 2 mL, Rfl: 3    Continuous Glucose Sensor (DEXCOM G7 SENSOR) Does not apply Misc, USE 1 EVERY 10 DAYS, Disp: 9 each, Rfl: 1    atorvastatin 40 MG Oral Tab, Take 1 tablet (40 mg total) by mouth nightly., Disp:  90 tablet, Rfl: 1    Continuous Blood Gluc Sensor (DEXCOM G7 SENSOR) Does not apply Misc, 1 each Every 10 days., Disp: 9 each, Rfl: 0    insulin aspart (NOVOLOG FLEXPEN) 100 Units/mL Subcutaneous Solution Pen-injector, Inject before meals three times daily via sliding scale; sugar 130-160, take 3 U; sugar 160-200, take 5 U; sugar 200-240, take 7 U; sugar 240-280, take 10 U, sugar > 280, take 12 U, Disp: 15 mL, Rfl: 0    gabapentin 100 MG Oral Cap, Take 1 capsule (100 mg total) by mouth 3 (three) times daily., Disp: 90 capsule, Rfl: 0    cyclobenzaprine 5 MG Oral Tab, TAKE 1 TABLET BY MOUTH 3 TIMES DAILY AS NEEDED FOR MUSCLE SPASMS., Disp: 60 tablet, Rfl: 1    aspirin 81 MG Oral Tab EC, Take 1 tablet (81 mg total) by mouth As Directed., Disp: , Rfl:     Insulin Degludec 200 UNIT/ML Subcutaneous Solution Pen-injector, Inject 55 Units into the skin at bedtime. 40 units s.c in am (Patient taking differently: Inject 55 Units into the skin at bedtime.), Disp: 15 mL, Rfl: 5    losartan 50 MG Oral Tab, Take 1 tablet (50 mg total) by mouth once daily., Disp: 90 tablet, Rfl: 3    Insulin Pen Needle (BD PEN NEEDLE MICRO U/F) 32G X 6 MM Does not apply Misc, Dx- E11.9; use with Tresiba and Novolog, Disp: 200 each, Rfl: 5    clotrimazole 1 % External Cream, Apply 1 Application topically 2 (two) times daily., Disp: 1 each, Rfl: 0    Meloxicam 15 MG Oral Tab, Take 1 tablet (15 mg total) by mouth in the morning., Disp: 30 tablet, Rfl: 3    Accu-Chek Multiclix Lancets Does not apply Misc, Check sugar AC and HS, Disp: 102 each, Rfl: 11    Glucose Blood (FREESTYLE LITE TEST) In Vitro Strip, Check sugar AC and HS; Dx- E11.9, Disp: 200 strip, Rfl: 11    BD PEN NEEDLE MINI U/F 31G X 5 MM Does not apply Misc, , Disp: , Rfl: 0    ACCU-CHEK MULTICLIX LANCETS Does not apply Misc, 1 each by Other route., Disp: , Rfl:      Allergies:   No Known Allergies    Social History:   Social History     Socioeconomic History    Marital status:     Tobacco Use    Smoking status: Never    Smokeless tobacco: Never   Vaping Use    Vaping status: Never Used   Substance and Sexual Activity    Alcohol use: No    Drug use: No   Other Topics Concern    Caffeine Concern No     Social Determinants of Health      [facility]    Social Connections    Received from [facility], [facility]    Housing Stability       Medical History:   Past Medical History:    Decorative tattoo    Diabetes (HCC)    Dr Marinelli    Diabetes mellitus (HCC)    Essential hypertension    Hemorrhoids    History of varicocele    repair -  in [place]    Hypercholesterolemia    Hyperlipidemia    Hypogonadism in male    testosterone level low, no supplement recommended    Oligospermia    Screening PSA (prostate specific antigen)    Sleep apnea    Na       Surgical history:   Past Surgical History:   Procedure Laterality Date    Colonoscopy  Na    Other surgical history  Na    Na         PHYSICAL EXAM  Vitals:    09/14/24 0900   BP: 115/77   Pulse: 88   Weight: 233 lb 9.6 oz (106 kg)   Height: 5' 7\" (1.702 m)      General Appearance:  alert, well developed, in no acute distress  Eyes:  normal conjunctivae, sclera., normal sclera and normal pupils  Psychiatric:  oriented to time, self, and place  Nutritional:  no abnormal weight gain or loss        Lab Data:   Lab Results   Component Value Date     (H) 01/10/2024    A1C 8.3 (H) 01/10/2024     Lab Results   Component Value Date     (H) 01/10/2024    BUN 12 01/10/2024    BUNCREA 14.1 01/10/2024    CREATSERUM 0.85 01/10/2024    ANIONGAP 2 01/10/2024    GFRNAA 101 03/16/2022    GFRAA 117 03/16/2022    CA 9.8 01/10/2024    OSMOCALC 283 01/10/2024    ALKPHO 83 01/10/2024    AST 22 01/10/2024    ALT 15 01/10/2024    ALKPHOS 61 09/05/2013    BILT 1.3 (H) 01/10/2024    TP 8.1 01/10/2024    ALB 4.2 01/10/2024    GLOBULIN 3.9 01/10/2024    AGRATIO  1.0 09/05/2013     01/10/2024    K 4.3 01/10/2024     01/10/2024    CO2 29.0 01/10/2024     Lab Results   Component Value Date    CHOLEST 216 (H) 01/10/2024    TRIG 81 01/10/2024    HDL 48 01/10/2024     (H) 01/10/2024    VLDL 15 01/10/2024    NONHDLC 168 (H) 01/10/2024     Lab Results   Component Value Date    MALBP 71.90 01/10/2024    CREUR 166.20 01/10/2024         ASSESSMENT/PLAN:  This is a 58 year-old man here for evaluation and management of uncontrolled type 2 diabetes. We discussed the ABCs of DM.     1.) Hyperglycemia Management- We discussed the importance of glycemic control to prevent complications of diabetes. We discussed the importance of SBGM. I offered and provided patient education materials and offered a blood glucose log book.   - Decrease Tresiba from 55 to 30 units at night once he starts on the 10mg of Mounjaro weekly  - Increase Mounjaro from 7.5mg to 10mg qweekly for 90 days once he has two pens of the 7.5mg dose left.   - He will continue to check the blood sugars fasting and two hours after biggest meal    2.) Management of Diabetic Complications- We discussed the complications of diabetes include retinopathy, neuropathy, nephropathy and cardiovascular disease.   - Ophtho- scheduled for later this year  - Flu and Covid vaccine- up to date  - BP- at goal, on meds  - Lipids- not at goal, continue atorvastatin 40mg and recheck in 3 months  - MAC- not at goal, will check in 3 months  - CMP- will check in 3 months  - Neuropathy- none  - CAD- none    3.) Lifestyle Management for Diabetes- We discussed importance of a low CHO diet, and recommend 45gm per meal or 135gm per day. We discussed the importance of trying to follow a Mediterranean diet, with an emphasis on vegetables at every meal, with lots whole grains, and protein from either plant-based sources, or poultry and fish.   - Diet- he is eating much healthier  - Exercise- he is very active    Return to clinic in 3  months     Prior to this encounter, I spent over 15 minutes with preparing for the visit, including reviewing documents from other specialties as well as from PCP and going over test results. During the face to face encounter, I spent an additional 15 minutes which were determined for follow-up. Greater than 50% of the time was spent in counseling, anticipatory guidance, and coordination of care. Patient concerns were answered to the best of my knowledge.         9/14/24  Zhanna Biswas MD

## 2024-10-03 ENCOUNTER — LAB ENCOUNTER (OUTPATIENT)
Dept: LAB | Facility: HOSPITAL | Age: 59
End: 2024-10-03
Attending: INTERNAL MEDICINE
Payer: COMMERCIAL

## 2024-10-03 ENCOUNTER — HOSPITAL ENCOUNTER (OUTPATIENT)
Dept: GENERAL RADIOLOGY | Facility: HOSPITAL | Age: 59
Discharge: HOME | End: 2024-10-03
Attending: INTERNAL MEDICINE
Payer: COMMERCIAL

## 2024-10-03 ENCOUNTER — OFFICE VISIT (OUTPATIENT)
Dept: INTERNAL MEDICINE CLINIC | Facility: CLINIC | Age: 59
End: 2024-10-03

## 2024-10-03 VITALS
OXYGEN SATURATION: 97 % | TEMPERATURE: 98 F | HEIGHT: 67 IN | WEIGHT: 229.19 LBS | BODY MASS INDEX: 35.97 KG/M2 | DIASTOLIC BLOOD PRESSURE: 66 MMHG | SYSTOLIC BLOOD PRESSURE: 114 MMHG | HEART RATE: 98 BPM

## 2024-10-03 DIAGNOSIS — E11.9 TYPE 2 DIABETES MELLITUS WITHOUT COMPLICATION, UNSPECIFIED WHETHER LONG TERM INSULIN USE (HCC): ICD-10-CM

## 2024-10-03 DIAGNOSIS — R50.9 FEVER, UNSPECIFIED FEVER CAUSE: ICD-10-CM

## 2024-10-03 DIAGNOSIS — E78.00 HYPERCHOLESTEREMIA: ICD-10-CM

## 2024-10-03 DIAGNOSIS — E11.65 TYPE 2 DIABETES MELLITUS WITH HYPERGLYCEMIA, WITH LONG-TERM CURRENT USE OF INSULIN (HCC): ICD-10-CM

## 2024-10-03 DIAGNOSIS — I10 BENIGN HYPERTENSION: ICD-10-CM

## 2024-10-03 DIAGNOSIS — R50.9 FEVER, UNSPECIFIED FEVER CAUSE: Primary | ICD-10-CM

## 2024-10-03 DIAGNOSIS — Z79.4 TYPE 2 DIABETES MELLITUS WITH HYPERGLYCEMIA, WITH LONG-TERM CURRENT USE OF INSULIN (HCC): ICD-10-CM

## 2024-10-03 DIAGNOSIS — E78.5 DYSLIPIDEMIA: ICD-10-CM

## 2024-10-03 LAB
ALBUMIN SERPL-MCNC: 4.3 G/DL (ref 3.2–4.8)
ALBUMIN/GLOB SERPL: 1.2 {RATIO} (ref 1–2)
ALP LIVER SERPL-CCNC: 102 U/L
ALT SERPL-CCNC: 14 U/L
ANION GAP SERPL CALC-SCNC: 6 MMOL/L (ref 0–18)
AST SERPL-CCNC: 21 U/L (ref ?–34)
BASOPHILS # BLD AUTO: 0.03 X10(3) UL (ref 0–0.2)
BASOPHILS NFR BLD AUTO: 0.4 %
BILIRUB SERPL-MCNC: 0.9 MG/DL (ref 0.3–1.2)
BILIRUB UR QL: NEGATIVE
BUN BLD-MCNC: 15 MG/DL (ref 9–23)
BUN/CREAT SERPL: 12.9 (ref 10–20)
CALCIUM BLD-MCNC: 9.9 MG/DL (ref 8.7–10.4)
CHLORIDE SERPL-SCNC: 108 MMOL/L (ref 98–112)
CHOLEST SERPL-MCNC: 193 MG/DL (ref ?–200)
CLARITY UR: CLEAR
CO2 SERPL-SCNC: 27 MMOL/L (ref 21–32)
CREAT BLD-MCNC: 1.16 MG/DL
CREAT UR-SCNC: 193.6 MG/DL
DEPRECATED RDW RBC AUTO: 39.7 FL (ref 35.1–46.3)
EGFRCR SERPLBLD CKD-EPI 2021: 74 ML/MIN/1.73M2 (ref 60–?)
EOSINOPHIL # BLD AUTO: 0.06 X10(3) UL (ref 0–0.7)
EOSINOPHIL NFR BLD AUTO: 0.9 %
ERYTHROCYTE [DISTWIDTH] IN BLOOD BY AUTOMATED COUNT: 12.3 % (ref 11–15)
FASTING PATIENT LIPID ANSWER: NO
FASTING STATUS PATIENT QL REPORTED: NO
GLOBULIN PLAS-MCNC: 3.5 G/DL (ref 2–3.5)
GLUCOSE BLD-MCNC: 138 MG/DL (ref 70–99)
GLUCOSE UR-MCNC: 30 MG/DL
HCT VFR BLD AUTO: 47.8 %
HDLC SERPL-MCNC: 45 MG/DL (ref 40–59)
HGB BLD-MCNC: 16.1 G/DL
HGB UR QL STRIP.AUTO: NEGATIVE
IMM GRANULOCYTES # BLD AUTO: 0.02 X10(3) UL (ref 0–1)
IMM GRANULOCYTES NFR BLD: 0.3 %
KETONES UR-MCNC: NEGATIVE MG/DL
LDLC SERPL CALC-MCNC: 134 MG/DL (ref ?–100)
LEUKOCYTE ESTERASE UR QL STRIP.AUTO: NEGATIVE
LYMPHOCYTES # BLD AUTO: 2.18 X10(3) UL (ref 1–4)
LYMPHOCYTES NFR BLD AUTO: 32.2 %
MCH RBC QN AUTO: 29.4 PG (ref 26–34)
MCHC RBC AUTO-ENTMCNC: 33.7 G/DL (ref 31–37)
MCV RBC AUTO: 87.2 FL
MICROALBUMIN UR-MCNC: 45.2 MG/DL
MICROALBUMIN/CREAT 24H UR-RTO: 233.5 UG/MG (ref ?–30)
MONOCYTES # BLD AUTO: 0.53 X10(3) UL (ref 0.1–1)
MONOCYTES NFR BLD AUTO: 7.8 %
NEUTROPHILS # BLD AUTO: 3.96 X10 (3) UL (ref 1.5–7.7)
NEUTROPHILS # BLD AUTO: 3.96 X10(3) UL (ref 1.5–7.7)
NEUTROPHILS NFR BLD AUTO: 58.4 %
NITRITE UR QL STRIP.AUTO: NEGATIVE
NONHDLC SERPL-MCNC: 148 MG/DL (ref ?–130)
OSMOLALITY SERPL CALC.SUM OF ELEC: 295 MOSM/KG (ref 275–295)
PH UR: 6 [PH] (ref 5–8)
PLATELET # BLD AUTO: 250 10(3)UL (ref 150–450)
POTASSIUM SERPL-SCNC: 4.2 MMOL/L (ref 3.5–5.1)
PROT SERPL-MCNC: 7.8 G/DL (ref 5.7–8.2)
PROT UR-MCNC: 70 MG/DL
RBC # BLD AUTO: 5.48 X10(6)UL
SODIUM SERPL-SCNC: 141 MMOL/L (ref 136–145)
SP GR UR STRIP: 1.02 (ref 1–1.03)
T4 FREE SERPL-MCNC: 1.1 NG/DL (ref 0.8–1.7)
TRIGL SERPL-MCNC: 75 MG/DL (ref 30–149)
TSI SER-ACNC: 1.09 MIU/ML (ref 0.55–4.78)
UROBILINOGEN UR STRIP-ACNC: NORMAL
VIT B12 SERPL-MCNC: 398 PG/ML (ref 211–911)
VIT D+METAB SERPL-MCNC: 16.8 NG/ML (ref 30–100)
VLDLC SERPL CALC-MCNC: 14 MG/DL (ref 0–30)
WBC # BLD AUTO: 6.8 X10(3) UL (ref 4–11)

## 2024-10-03 PROCEDURE — 99214 OFFICE O/P EST MOD 30 MIN: CPT | Performed by: INTERNAL MEDICINE

## 2024-10-03 PROCEDURE — 3074F SYST BP LT 130 MM HG: CPT | Performed by: INTERNAL MEDICINE

## 2024-10-03 PROCEDURE — 84443 ASSAY THYROID STIM HORMONE: CPT

## 2024-10-03 PROCEDURE — 80053 COMPREHEN METABOLIC PANEL: CPT

## 2024-10-03 PROCEDURE — 87040 BLOOD CULTURE FOR BACTERIA: CPT

## 2024-10-03 PROCEDURE — 82043 UR ALBUMIN QUANTITATIVE: CPT

## 2024-10-03 PROCEDURE — 82607 VITAMIN B-12: CPT

## 2024-10-03 PROCEDURE — 3008F BODY MASS INDEX DOCD: CPT | Performed by: INTERNAL MEDICINE

## 2024-10-03 PROCEDURE — 87086 URINE CULTURE/COLONY COUNT: CPT

## 2024-10-03 PROCEDURE — 3078F DIAST BP <80 MM HG: CPT | Performed by: INTERNAL MEDICINE

## 2024-10-03 PROCEDURE — 82570 ASSAY OF URINE CREATININE: CPT

## 2024-10-03 PROCEDURE — 81001 URINALYSIS AUTO W/SCOPE: CPT

## 2024-10-03 PROCEDURE — 80061 LIPID PANEL: CPT

## 2024-10-03 PROCEDURE — 71046 X-RAY EXAM CHEST 2 VIEWS: CPT | Performed by: INTERNAL MEDICINE

## 2024-10-03 PROCEDURE — 82306 VITAMIN D 25 HYDROXY: CPT

## 2024-10-03 PROCEDURE — 84439 ASSAY OF FREE THYROXINE: CPT

## 2024-10-03 PROCEDURE — 85025 COMPLETE CBC W/AUTO DIFF WBC: CPT

## 2024-10-03 PROCEDURE — 3060F POS MICROALBUMINURIA REV: CPT | Performed by: INTERNAL MEDICINE

## 2024-10-03 PROCEDURE — 36415 COLL VENOUS BLD VENIPUNCTURE: CPT

## 2024-10-03 NOTE — PROGRESS NOTES
Nic Rajput is a 58 year old male.    HPI:     Chief Complaint   Patient presents with    Checkup     Patient c/o chills, headaches, abdominal pain/cramping, aching joints and elevated temps.        59 y/o M with 2 week h/o headaches, arthralgias, then +chills; denies fevers; temp was 101 on 9/23/24; no dysuria; no urine frequency; no cough; 1 d h/o sinus or nasal congestion; nares COVID neg x 2; no diarrhea; no abdominal; then had temp 102 on 9/27/24 at 4AM; +sinus congestion;       HISTORY:  Past Medical History:    Decorative tattoo    Diabetes (HCC)    Dr Marinelli    Diabetes mellitus (HCC)    Essential hypertension    Hemorrhoids    History of varicocele    repair -  in [place]    Hypercholesterolemia    Hyperlipidemia    Hypogonadism in male    testosterone level low, no supplement recommended    Oligospermia    Screening PSA (prostate specific antigen)    Sleep apnea    Na      Past Surgical History:   Procedure Laterality Date    Colonoscopy  Na    Other surgical history  Na    Na      Family History   Problem Relation Age of Onset    Stroke Father         N    Diabetes Father         N    Hypertension Father         N    Lipids Father         N    Heart Disease Father         CAD    Diabetes Mother         N    Hypertension Mother         N    Migraines Mother     Lipids Mother         N    Ovarian Cancer Mother     Cancer Mother         N      Social History:   Social History     Socioeconomic History    Marital status:    Tobacco Use    Smoking status: Never    Smokeless tobacco: Never   Vaping Use    Vaping status: Never Used   Substance and Sexual Activity    Alcohol use: No    Drug use: No   Other Topics Concern    Caffeine Concern No     Social Determinants of Health      [facility], [facility]    Social Connections    Received from [facility], [facility]    Housing Stability        Medications  (Active prior to today's visit):  Current Outpatient Medications   Medication Sig Dispense Refill    insulin degludec 200 UNIT/ML Subcutaneous Solution Pen-injector Inject 30 Units into the skin at bedtime. 15 mL 1    Tirzepatide (MOUNJARO) 10 MG/0.5ML Subcutaneous Solution Pen-injector Inject 10 mg into the skin once a week. 6 mL 1    Continuous Glucose Sensor (DEXCOM G7 SENSOR) Does not apply Misc USE 1 EVERY 10 DAYS 9 each 1    atorvastatin 40 MG Oral Tab Take 1 tablet (40 mg total) by mouth nightly. 90 tablet 1    Continuous Blood Gluc Sensor (DEXCOM G7 SENSOR) Does not apply Misc 1 each Every 10 days. 9 each 0    gabapentin 100 MG Oral Cap Take 1 capsule (100 mg total) by mouth 3 (three) times daily. 90 capsule 0    cyclobenzaprine 5 MG Oral Tab TAKE 1 TABLET BY MOUTH 3 TIMES DAILY AS NEEDED FOR MUSCLE SPASMS. 60 tablet 1    aspirin 81 MG Oral Tab EC Take 1 tablet (81 mg total) by mouth As Directed.      losartan 50 MG Oral Tab Take 1 tablet (50 mg total) by mouth once daily. 90 tablet 3    Insulin Pen Needle (BD PEN NEEDLE MICRO U/F) 32G X 6 MM Does not apply Misc Dx- E11.9; use with Tresiba and Novolog 200 each 5    clotrimazole 1 % External Cream Apply 1 Application topically 2 (two) times daily. 1 each 0    Meloxicam 15 MG Oral Tab Take 1 tablet (15 mg total) by mouth in the morning. 30 tablet 3    Accu-Chek Multiclix Lancets Does not apply Misc Check sugar AC and  each 11    Glucose Blood (FREESTYLE LITE TEST) In Vitro Strip Check sugar AC and HS; Dx- E11.9 200 strip 11    BD PEN NEEDLE MINI U/F 31G X 5 MM Does not apply Misc   0    ACCU-CHEK MULTICLIX LANCETS Does not apply Misc 1 each by Other route.         Allergies:  No Known Allergies              ROS:   Constitutional: no weight loss; no fatigue  Cardiovascular:  Negative for chest pain; negative palpitations  Respiratory:  Negative for cough, dyspnea and wheezing  Gastrointestinal:  Negative for abdominal pain, constipation, decreased  appetite, diarrhea and vomiting; no melena or hematochezia  All other review of systems are negative.        PHYSICAL EXAM:   Blood pressure 114/66, pulse 98, temperature 98.3 °F (36.8 °C), height 5' 7\" (1.702 m), weight 229 lb 3.2 oz (104 kg), SpO2 97%.  Constitutional: alert and oriented x3 in no acute distress  HEENT- EOMI, PERRL  Nose/Mouth/Throat: pharynx without erythema; no oral lesions  Neck/Thyroid: neck supple; no thyromegaly  Cardiovascular: RRR, S1, S2, no S3 or murmur  Respiratory: lungs without crackles or wheezes  Abdomen: normoactive bowel sounds, soft, non-tender and non-distended  Extremities: no clubbing, cyanosis or edema           ASSESSMENT/PLAN:   Fever  Etiology unclear; exam non-localizing; sxs x 5d; nares neg COVID x 2  -check CBC, CMP, TSH, UA C&S, CXR, BCx x 2    Low back pain  Lumbar radiculopathy  Lumbar spondylosis  No longer takes gabapentin 300 mg po TID; MRI lumbar spine in May 2018 showed Mild degenerative disc and facet disease in the lower lumbar spine, most prominent at L5-S1, Moderate narrowing of the bilateral neural foramen at L5-S1, Grade I anterolisthesis of L5 on S1; s/p physical therapy; no longer taking tramadol 50 mg po TID prn, or Norco prn; takes cyclobenzaprine 10 mg po TID prn; was using meloxicam 15 mg po qD prn; s/p LESI x3 with decrease in pain; [facility]  -has also seen Dr Kobi W[facility] at [facility]  -repeat XR L-spine 10/26/22 shows multilevel DJD at L4-L5 and L5-S1 with L5 spondylolisthesis; no fracture  -meloxicam 15 mg po every day  -last went to physical therapy about one year ago; previously only went to three sessions due to cost concerns and time constrictions; advise pt pursue course of physical therapy;     Colon polyp  Had colonoscopy on 5/11/18 by GI Dr Combs with +one adenomatous polyp removed and vega-appendiceal nodularity; pt was advised for repeat colonoscopy due in one year (or May 2019); advises see  GI DR Combs for colonoscopy      ZAIDA  +unrefreshing sleep; +daytime hypersomnolence; +snoring; on nasal CPAP 10 CWP; pt is compliant with CPAP and benefits from its use; DME is [facility]     Carpal tunnel syndrome  Affects both hands; EMG in Feb 2014 showed severe carpal tunnel syndrome; s/p injections by Dr Brian Moctezuma at [facility] in [place]; on gabapentin 100 mg po TID prn;   -s/p B carpal tunnel release in June and July 2022 by [facility]     Diabetes mellitus Type II   A1C 7.9% in Sept 2024; A1C 13.5% in March 2023; A1C 11.7% in March 2022;  sees optometrist at [facility], last in Nov 2023  -Rx per Endocrine Dr Biswas,   -on Tresiba 30 units subcutaneous at bedtime, Mounjaro 10 mg subcutaneous qWeek     Hypertension   on losartan 50 mg po qD     Hypercholesterolemia   in Jan 2024; now on Lipitor 40 mg po at bedtime; due for repeat Lipids     Onychomycosis  Much improved; s/p terbinafine 250 mg po qD x 8 mos under care of podiatrist, Dr Welch; now on no meds     PSA screening  PSA 0.64 in Jan 2024    Health Maintenance  Td in 2023            cell [phone]           Spent 30 minutes obtaining history, evaluating patient, discussing treatment options, diet, exercise, review of available labs and radiology reports, and completing documentation.             Orders This Visit:  No orders of the defined types were placed in this encounter.      Meds This Visit:  Requested Prescriptions      No prescriptions requested or ordered in this encounter       Imaging & Referrals:  None     10/3/2024  Mynor Last MD

## 2024-10-28 DIAGNOSIS — E11.65 TYPE 2 DIABETES MELLITUS WITH HYPERGLYCEMIA, WITH LONG-TERM CURRENT USE OF INSULIN (HCC): ICD-10-CM

## 2024-10-28 DIAGNOSIS — E78.5 DYSLIPIDEMIA: ICD-10-CM

## 2024-10-28 DIAGNOSIS — Z79.4 TYPE 2 DIABETES MELLITUS WITH HYPERGLYCEMIA, WITH LONG-TERM CURRENT USE OF INSULIN (HCC): ICD-10-CM

## 2024-10-28 NOTE — TELEPHONE ENCOUNTER
Endocrine refill protocol for lipid lowering medications    Protocol Criteria:  FAILED Reason: Abnormal labs    If all below requirements are met, send a 90-day supply with 1 refill per provider protocol.    Verify appointment with Endocrinology completed in the last 6 months or scheduled in the next 3 months.  Lipid panel must have been completed in the last 12 months   ALT result below 80  LDL result below 130    Last completed office visit:9/14/2024 Zhanna Biswas MD   Next scheduled Follow up:   Future Appointments   Date Time Provider Department Center   12/24/2024  9:00 AM Zhanna Biswas MD [facility]      Last Lipid panel date: Cholesterol: 193, done on 10/3/2024.  HDL Cholesterol: 45, done on 10/3/2024.  TriGlycerides 75, done on 10/3/2024.  LDL Cholesterol: 134, done on 10/3/2024.     Last ALT result: Last ALT was 14 done on 10/3/2024.  Last AST was 21 done on 10/3/2024.

## 2024-11-03 RX ORDER — ATORVASTATIN CALCIUM 40 MG/1
40 TABLET, FILM COATED ORAL NIGHTLY
Qty: 90 TABLET | Refills: 0 | Status: SHIPPED | OUTPATIENT
Start: 2024-11-03

## 2024-11-11 RX ORDER — PEN NEEDLE, DIABETIC 32 GX 1/4"
NEEDLE, DISPOSABLE MISCELLANEOUS
Qty: 100 EACH | Refills: 3 | Status: SHIPPED | OUTPATIENT
Start: 2024-11-11

## 2024-12-17 NOTE — PROGRESS NOTES
Patient presents to [facility] ambulatory for follow up epidural injection done 7-2 by Dr Osvaldo Lan. He reports 90% relief since the injection. He states that the pain does not radiate down his right leg anymore and it does not get numb.   DANIEL Slaughter in to see patient [] : Fellow [Fellow] : Fellow

## 2024-12-24 ENCOUNTER — OFFICE VISIT (OUTPATIENT)
Dept: ENDOCRINOLOGY CLINIC | Facility: CLINIC | Age: 59
End: 2024-12-24

## 2024-12-24 VITALS — BODY MASS INDEX: 35.79 KG/M2 | WEIGHT: 228 LBS | HEIGHT: 67 IN

## 2024-12-24 DIAGNOSIS — I10 ESSENTIAL HYPERTENSION: ICD-10-CM

## 2024-12-24 DIAGNOSIS — Z79.4 TYPE 2 DIABETES MELLITUS WITH HYPERGLYCEMIA, WITH LONG-TERM CURRENT USE OF INSULIN (HCC): Primary | ICD-10-CM

## 2024-12-24 DIAGNOSIS — E11.65 TYPE 2 DIABETES MELLITUS WITH HYPERGLYCEMIA, WITH LONG-TERM CURRENT USE OF INSULIN (HCC): Primary | ICD-10-CM

## 2024-12-24 DIAGNOSIS — E78.5 DYSLIPIDEMIA: ICD-10-CM

## 2024-12-24 LAB
GLUCOSE BLOOD: 131
HEMOGLOBIN A1C: 7.5 % (ref 4.3–5.6)
TEST STRIP LOT #: NORMAL NUMERIC

## 2024-12-24 PROCEDURE — 83036 HEMOGLOBIN GLYCOSYLATED A1C: CPT | Performed by: INTERNAL MEDICINE

## 2024-12-24 PROCEDURE — 82947 ASSAY GLUCOSE BLOOD QUANT: CPT | Performed by: INTERNAL MEDICINE

## 2024-12-24 PROCEDURE — 3008F BODY MASS INDEX DOCD: CPT | Performed by: INTERNAL MEDICINE

## 2024-12-24 PROCEDURE — 3051F HG A1C>EQUAL 7.0%<8.0%: CPT | Performed by: INTERNAL MEDICINE

## 2024-12-24 PROCEDURE — 99214 OFFICE O/P EST MOD 30 MIN: CPT | Performed by: INTERNAL MEDICINE

## 2024-12-24 RX ORDER — TIRZEPATIDE 12.5 MG/.5ML
12.5 INJECTION, SOLUTION SUBCUTANEOUS WEEKLY
Qty: 6 ML | Refills: 1 | Status: SHIPPED | OUTPATIENT
Start: 2024-12-24 | End: 2025-03-24

## 2024-12-24 RX ORDER — TIRZEPATIDE 10 MG/.5ML
10 INJECTION, SOLUTION SUBCUTANEOUS WEEKLY
COMMUNITY
End: 2024-12-24

## 2024-12-24 NOTE — PROGRESS NOTES
-----------------------------  [employer] Clarity  -----------------------------  Nic Regulo  YOB: 1965  Generated at: Tue, Dec 24, 2024 9:28 AM CST  Reporting period: Mon Nov 25, 2024 - Tue Dec 24, 2024  -----------------------------  Glucose Details  Average glucose: 202 mg/dL  Standard deviation: 59 mg/dL  GMI: 8.1%  -----------------------------  Time in Range  Very High: 22%  High: 39%  In Range: 39%  Low: 0%  Very Low: 0%  Target Range   mg/dL    -----------------------------  CGM Details  Sensor usage: 80%  Days with CGM data: 24/30

## 2024-12-24 NOTE — PROGRESS NOTES
Name: Nic Rajput  Date: 12/24/24    Referring Physician: No ref. provider found    HISTORY OF PRESENT ILLNESS   Nic Rajput is a 59 year old male who presents for evaluation and management of type 2 diabetes. He was diagnosed with diabetes about 17 years ago. A few visits ago, I had started him on Mounjaro and stopped his Novolog. At the last visit, I had increased his Mounjaro dose. He is doing really well. I had also decreased the Tresiba from 55 to 30 units.    Blood Glucose Today: 131  HbA1C or glycohemoglobin is 7.5 today (and it was 7.9 on 9/14/24 and it was 8.3 on 1/10/24 and it was 10.8 on 10/18/23)  Type 1 or Type 2?: Type 2   Medications for DM: Tresiba 30 units at bedtime; Mounjaro 10mg qweekly  Checking many times per day  Dex[employer] Clarity  -----------------------------  Nic Rajput  YOB: 1965  Generated at: Tue, Dec 24, 2024 9:28 AM CST  Reporting period: Mon Nov 25, 2024 - Tue Dec 24, 2024  -----------------------------  Glucose Details  Average glucose: 202 mg/dL  Standard deviation: 59 mg/dL  GMI: 8.1%  -----------------------------  Time in Range  Very High: 22%  High: 39%  In Range: 39%  Low: 0%  Very Low: 0%  Target Range   mg/dL     -----------------------------  CGM Details  Sensor usage: 80%  Days with CGM data: 24/30    Dietary compliance: he is eating very healthy    Exercise: he is very active physically    Polyuria/polydipsia: none    Blurred vision: none    Flu Vaccine This Season: yes    Covid Vaccine: yes    REVIEW OF SYSTEMS  CV: Cardiovascular disease present: none         Hypertension present: at goal, on meds         Hyperlipidemia present: not at goal, on meds, but better (10/03/24)         Peripheral Vascular Disease present: none    : Nephropathy present: MAC: 233.5 (10/03/24) Creatinine: 1.16     Neuro: Neuropathy present: none    Eyes: Diabetic retinopathy present: unknown            Most recent visit to eye doctor in last 12 months: has an appt  scheduled    Skin: Infection or ulceration: none    Osteoporosis/ Osteopenia: none Vitamin D: none    Thyroid disease: none TSH: 1.090 (10/03/24)    Medications:     Current Outpatient Medications:     Tirzepatide (MOUNJARO) 10 MG/0.5ML Subcutaneous Solution Auto-injector, Inject 10 mg into the skin once a week., Disp: , Rfl:     insulin degludec 200 UNIT/ML Subcutaneous Solution Pen-injector, Inject 30 Units into the skin at bedtime., Disp: 15 mL, Rfl: 1    Insulin Pen Needle (BD PEN NEEDLE MICRO U/F) 32G X 6 MM Does not apply Misc, Dx- E11.9; use with Tresiba, Disp: 100 each, Rfl: 3    ATORVASTATIN 40 MG Oral Tab, Take 1 tablet (40 mg total) by mouth nightly., Disp: 90 tablet, Rfl: 0    Continuous Glucose Sensor (DEXCOM G7 SENSOR) Does not apply Misc, USE 1 EVERY 10 DAYS, Disp: 9 each, Rfl: 1    Continuous Blood Gluc Sensor (DEXCOM G7 SENSOR) Does not apply Misc, 1 each Every 10 days., Disp: 9 each, Rfl: 0    gabapentin 100 MG Oral Cap, Take 1 capsule (100 mg total) by mouth 3 (three) times daily., Disp: 90 capsule, Rfl: 0    cyclobenzaprine 5 MG Oral Tab, TAKE 1 TABLET BY MOUTH 3 TIMES DAILY AS NEEDED FOR MUSCLE SPASMS., Disp: 60 tablet, Rfl: 1    aspirin 81 MG Oral Tab EC, Take 1 tablet (81 mg total) by mouth As Directed., Disp: , Rfl:     losartan 50 MG Oral Tab, Take 1 tablet (50 mg total) by mouth once daily., Disp: 90 tablet, Rfl: 3    clotrimazole 1 % External Cream, Apply 1 Application topically 2 (two) times daily., Disp: 1 each, Rfl: 0    Meloxicam 15 MG Oral Tab, Take 1 tablet (15 mg total) by mouth in the morning., Disp: 30 tablet, Rfl: 3    Accu-Chek Multiclix Lancets Does not apply Misc, Check sugar AC and HS, Disp: 102 each, Rfl: 11    Glucose Blood (FREESTYLE LITE TEST) In Vitro Strip, Check sugar AC and HS; Dx- E11.9, Disp: 200 strip, Rfl: 11    BD PEN NEEDLE MINI U/F 31G X 5 MM Does not apply Misc, , Disp: , Rfl: 0    ACCU-CHEK MULTICLIX LANCETS Does not apply Misc, 1 each by Other route., Disp:  , Rfl:      Allergies:   No Known Allergies    Social History:   Social History     Socioeconomic History    Marital status:    Tobacco Use    Smoking status: Never    Smokeless tobacco: Never   Vaping Use    Vaping status: Never Used   Substance and Sexual Activity    Alcohol use: No    Drug use: No   Other Topics Concern    Caffeine Concern No     Social Drivers of Health      [facility]    Social Connections    [facility] from [facility]    Housing Stability       Medical History:   Past Medical History:    Decorative tattoo    Diabetes (HCC)    Dr Marinelli    Diabetes mellitus (HCC)    Essential hypertension    Hemorrhoids    History of varicocele    repair -  in [place]    Hypercholesterolemia    Hyperlipidemia    Hypogonadism in male    testosterone level low, no supplement recommended    Oligospermia    Screening PSA (prostate specific antigen)    Sleep apnea    Na       Surgical history:   Past Surgical History:   Procedure Laterality Date    Colonoscopy  Na    Other surgical history  Na    Na         PHYSICAL EXAM  Vitals:    12/24/24 0910   Weight: 228 lb (103.4 kg)   Height: 5' 7\" (1.702 m)      General Appearance:  alert, well developed, in no acute distress  Eyes:  normal conjunctivae, sclera., normal sclera and normal pupils  Psychiatric:  oriented to time, self, and place  Nutritional:  no abnormal weight gain or loss        Lab Data:   Lab Results   Component Value Date     (H) 01/10/2024    A1C 7.9 (A) 09/14/2024     Lab Results   Component Value Date     (H) 10/03/2024    BUN 15 10/03/2024    BUNCREA 12.9 10/03/2024    CREATSERUM 1.16 10/03/2024    ANIONGAP 6 10/03/2024    GFRNAA 101 03/16/2022    GFRAA 117 03/16/2022    CA 9.9 10/03/2024    OSMOCALC 295 10/03/2024    ALKPHO 102 10/03/2024    AST 21 10/03/2024    ALT 14 10/03/2024    ALKPHOS 61 09/05/2013    BILT  0.9 10/03/2024    TP 7.8 10/03/2024    ALB 4.3 10/03/2024    GLOBULIN 3.5 10/03/2024    AGRATIO 1.0 09/05/2013     10/03/2024    K 4.2 10/03/2024     10/03/2024    CO2 27.0 10/03/2024     Lab Results   Component Value Date    CHOLEST 193 10/03/2024    TRIG 75 10/03/2024    HDL 45 10/03/2024     (H) 10/03/2024    VLDL 14 10/03/2024    NONHDLC 148 (H) 10/03/2024     Lab Results   Component Value Date    MALBP 45.20 10/03/2024    CREUR 193.60 10/03/2024         ASSESSMENT/PLAN:  This is a 59 year-old man here for evaluation and management of uncontrolled type 2 diabetes. We discussed the ABCs of DM.     1.) Hyperglycemia Management- We discussed the importance of glycemic control to prevent complications of diabetes. We discussed the importance of SBGM. I offered and provided patient education materials and offered a blood glucose log book.   - Continue Tresiba 30 units at night   - Increase Mounjaro from 10mg to 12.5mg qweekly for 90 days    - He will continue to check the blood sugars fasting and two hours after biggest meal    2.) Management of Diabetic Complications- We discussed the complications of diabetes include retinopathy, neuropathy, nephropathy and cardiovascular disease.   - Ophtho- scheduled for later this year  - Flu and Covid vaccine- up to date  - BP- at goal, on meds  - Lipids- not at goal, continue atorvastatin 40mg and recheck in 3 months  - MAC- not at goal, will check in 3 months  - CMP- will check in 3 months  - Neuropathy- none  - CAD- none    3.) Lifestyle Management for Diabetes- We discussed importance of a low CHO diet, and recommend 45gm per meal or 135gm per day. We discussed the importance of trying to follow a Mediterranean diet, with an emphasis on vegetables at every meal, with lots whole grains, and protein from either plant-based sources, or poultry and fish.   - Diet- he is eating much healthier  - Exercise- he is very active    Return to clinic in 3 months      Prior to this encounter, I spent over 15 minutes with preparing for the visit, including reviewing documents from other specialties as well as from PCP and going over test results. During the face to face encounter, I spent an additional 15 minutes which were determined for follow-up. Greater than 50% of the time was spent in counseling, anticipatory guidance, and coordination of care. Patient concerns were answered to the best of my knowledge.         12/24/24  Zhanna Biswas MD

## 2025-01-18 DIAGNOSIS — E11.65 TYPE 2 DIABETES MELLITUS WITH HYPERGLYCEMIA, WITH LONG-TERM CURRENT USE OF INSULIN (HCC): ICD-10-CM

## 2025-01-18 DIAGNOSIS — Z79.4 TYPE 2 DIABETES MELLITUS WITH HYPERGLYCEMIA, WITH LONG-TERM CURRENT USE OF INSULIN (HCC): ICD-10-CM

## 2025-01-20 RX ORDER — ACYCLOVIR 400 MG/1
TABLET ORAL
Qty: 9 EACH | Refills: 1 | Status: SHIPPED | OUTPATIENT
Start: 2025-01-20

## 2025-01-20 NOTE — TELEPHONE ENCOUNTER
Endocrine Refill protocol for CGM supplies     Protocol Criteria:  PASSED     If below requirement is met, send a 90-day supply with 1 refill per provider protocol.     Verify appointment with Endocrinology completed in the last 12 months or scheduled in the next 6 months     Last completed office visit:12/24/2024 Zhanna Biswas MD   Next scheduled Follow up:   Future Appointments   Date Time Provider Department Center   3/15/2025  8:00 AM [facility]

## 2025-02-04 DIAGNOSIS — Z79.4 TYPE 2 DIABETES MELLITUS WITH HYPERGLYCEMIA, WITH LONG-TERM CURRENT USE OF INSULIN (HCC): ICD-10-CM

## 2025-02-04 DIAGNOSIS — E78.5 DYSLIPIDEMIA: ICD-10-CM

## 2025-02-04 DIAGNOSIS — E11.65 TYPE 2 DIABETES MELLITUS WITH HYPERGLYCEMIA, WITH LONG-TERM CURRENT USE OF INSULIN (HCC): ICD-10-CM

## 2025-02-04 NOTE — TELEPHONE ENCOUNTER
Endocrine refill protocol for lipid lowering medications    Protocol Criteria:  FAILED Reason: Abnormal labs    If all below requirements are met, send a 90-day supply with 1 refill per provider protocol.    Verify appointment with Endocrinology completed in the last 6 months or scheduled in the next 3 months.  Lipid panel must have been completed in the last 12 months   ALT result below 80  LDL result below 130    Last completed office visit:12/24/2024 Zhanna Biswas MD   Next scheduled Follow up:   Future Appointments   Date Time Provider Department Center   3/15/2025  8:00 AM Zhanna Biswas MD [facility]      Last Lipid panel date: Cholesterol: 193, done on 10/3/2024.  HDL Cholesterol: 45, done on 10/3/2024.  TriGlycerides 75, done on 10/3/2024.  LDL Cholesterol: 134, done on 10/3/2024.     Last ALT result: Last ALT was 14 done on 10/3/2024.  Last AST was 21 done on 10/3/2024.

## 2025-02-06 RX ORDER — ATORVASTATIN CALCIUM 40 MG/1
40 TABLET, FILM COATED ORAL NIGHTLY
Qty: 90 TABLET | Refills: 0 | Status: SHIPPED | OUTPATIENT
Start: 2025-02-06

## 2025-03-09 DIAGNOSIS — Z79.4 TYPE 2 DIABETES MELLITUS WITH HYPERGLYCEMIA, WITH LONG-TERM CURRENT USE OF INSULIN (HCC): ICD-10-CM

## 2025-03-09 DIAGNOSIS — E11.65 TYPE 2 DIABETES MELLITUS WITH HYPERGLYCEMIA, WITH LONG-TERM CURRENT USE OF INSULIN (HCC): ICD-10-CM

## 2025-03-10 NOTE — TELEPHONE ENCOUNTER
Endocrine Refill protocol for oral and injectable diabetic medications    Protocol Criteria:  PASSED  Reason: N/A    If all below requirements are met, send a 90-day supply with 1 refill per provider protocol.    Verify appointment with Endocrinology completed in the last 6 months or scheduled in the next 3 months.  Verify A1C has been completed within the last 6 months and is below 8.5%     Last completed office visit: 12/24/2024 Zhanna Biswas MD   Next scheduled Follow up:   Future Appointments   Date Time Provider Department Center   3/15/2025  8:00 AM Zhanna Biswas MD [facility]      Last A1c result: Last A1c value was 7.5% done 12/24/2024.

## 2025-03-12 RX ORDER — TIRZEPATIDE 12.5 MG/.5ML
12.5 INJECTION, SOLUTION SUBCUTANEOUS WEEKLY
Qty: 6 ML | Refills: 1 | Status: SHIPPED | OUTPATIENT
Start: 2025-03-12 | End: 2025-06-10

## 2025-03-15 ENCOUNTER — OFFICE VISIT (OUTPATIENT)
Dept: ENDOCRINOLOGY CLINIC | Facility: CLINIC | Age: 60
End: 2025-03-15
Payer: COMMERCIAL

## 2025-03-15 VITALS
WEIGHT: 213.19 LBS | SYSTOLIC BLOOD PRESSURE: 123 MMHG | DIASTOLIC BLOOD PRESSURE: 79 MMHG | HEART RATE: 83 BPM | BODY MASS INDEX: 33.46 KG/M2 | HEIGHT: 67 IN | RESPIRATION RATE: 18 BRPM

## 2025-03-15 DIAGNOSIS — E66.811 CLASS 1 OBESITY DUE TO EXCESS CALORIES WITH SERIOUS COMORBIDITY AND BODY MASS INDEX (BMI) OF 33.0 TO 33.9 IN ADULT: ICD-10-CM

## 2025-03-15 DIAGNOSIS — I10 ESSENTIAL HYPERTENSION: ICD-10-CM

## 2025-03-15 DIAGNOSIS — E11.65 TYPE 2 DIABETES MELLITUS WITH HYPERGLYCEMIA, WITH LONG-TERM CURRENT USE OF INSULIN (HCC): Primary | ICD-10-CM

## 2025-03-15 DIAGNOSIS — E66.09 CLASS 1 OBESITY DUE TO EXCESS CALORIES WITH SERIOUS COMORBIDITY AND BODY MASS INDEX (BMI) OF 33.0 TO 33.9 IN ADULT: ICD-10-CM

## 2025-03-15 DIAGNOSIS — N52.9 ERECTILE DYSFUNCTION, UNSPECIFIED ERECTILE DYSFUNCTION TYPE: ICD-10-CM

## 2025-03-15 DIAGNOSIS — R53.83 FATIGUE, UNSPECIFIED TYPE: ICD-10-CM

## 2025-03-15 DIAGNOSIS — Z79.4 TYPE 2 DIABETES MELLITUS WITH HYPERGLYCEMIA, WITH LONG-TERM CURRENT USE OF INSULIN (HCC): Primary | ICD-10-CM

## 2025-03-15 DIAGNOSIS — E78.5 DYSLIPIDEMIA: ICD-10-CM

## 2025-03-15 LAB
GLUCOSE BLOOD: 170
HEMOGLOBIN A1C: 7.2 % (ref 4.3–5.6)
TEST STRIP LOT #: NORMAL NUMERIC

## 2025-03-15 PROCEDURE — 99214 OFFICE O/P EST MOD 30 MIN: CPT | Performed by: INTERNAL MEDICINE

## 2025-03-15 PROCEDURE — 82947 ASSAY GLUCOSE BLOOD QUANT: CPT | Performed by: INTERNAL MEDICINE

## 2025-03-15 PROCEDURE — 83036 HEMOGLOBIN GLYCOSYLATED A1C: CPT | Performed by: INTERNAL MEDICINE

## 2025-03-15 PROCEDURE — 3078F DIAST BP <80 MM HG: CPT | Performed by: INTERNAL MEDICINE

## 2025-03-15 PROCEDURE — 3008F BODY MASS INDEX DOCD: CPT | Performed by: INTERNAL MEDICINE

## 2025-03-15 PROCEDURE — 3074F SYST BP LT 130 MM HG: CPT | Performed by: INTERNAL MEDICINE

## 2025-03-15 PROCEDURE — 3051F HG A1C>EQUAL 7.0%<8.0%: CPT | Performed by: INTERNAL MEDICINE

## 2025-03-15 RX ORDER — TIRZEPATIDE 12.5 MG/.5ML
12.5 INJECTION, SOLUTION SUBCUTANEOUS WEEKLY
Qty: 6 ML | Refills: 3 | Status: SHIPPED | OUTPATIENT
Start: 2025-03-15 | End: 2025-06-13

## 2025-03-15 RX ORDER — TADALAFIL 2.5 MG/1
2.5 TABLET ORAL
Qty: 90 TABLET | Refills: 0 | Status: SHIPPED | OUTPATIENT
Start: 2025-03-15 | End: 2025-06-13

## 2025-03-15 NOTE — PROGRESS NOTES
Name: Nic Rajput  Date: 3/15/25    Referring Physician: No ref. provider found    HISTORY OF PRESENT ILLNESS   Nic Rajput is a 59 year old male who presents for evaluation and management of type 2 diabetes. He was diagnosed with diabetes about 17 years ago. A few visits ago, I had started him on Mounjaro and stopped his Novolog.     12/24/24: At the last visit, I had increased his Mounjaro dose. He is doing really well. I had also decreased the Tresiba from 55 to 30 units.    3/15/25: At the last visit, I had again increased the Mounjaro dose to 12.5mg and continued the 30 units.     Blood Glucose Today: 170  HbA1C or glycohemoglobin is 7.2 today (and it was 7.5 on 12/24/24 and it was 7.9 on 9/14/24 and it was 8.3 on 1/10/24 and it was 10.8 on 10/18/23)  Type 1 or Type 2?: Type 2   Medications for DM: Tresiba 30 units at bedtime; Mounjaro 12.5mg qweekly  Checking many times per day  [employer] Clarity  -----------------------------  Nic Rajput  YOB: 1965  Generated at: Sat, Mar 15, 2025 7:46 AM CDT  Reporting period: Fri Feb 14, 2025 - Sat Mar 15, 2025  -----------------------------  Glucose Details  Average glucose: 171 mg/dL  GMI: 7.4%  Standard deviation: 56 mg/dL  Coefficient of Variation: 32.9%  -----------------------------  Time in Range  Very High: 9%  High: 28%  In Range: 62%  Low: 0%  Very Low: 1%     Target Range   mg/dL     -----------------------------  Sensor usage  Days with data: 28/30  Time active: 92%  Avg. calibrations per day: 0.0     Dietary compliance: he is eating very healthy    Exercise: he is very active physically    Polyuria/polydipsia: none    Blurred vision: none    Flu Vaccine This Season: yes    Covid Vaccine: yes    REVIEW OF SYSTEMS  CV: Cardiovascular disease present: none         Hypertension present: at goal, on meds         Hyperlipidemia present: not at goal, on meds, but better (10/03/24)         Peripheral Vascular Disease present: none    :  Nephropathy present: MAC: 233.5 (10/03/24) Creatinine: 1.16     Neuro: Neuropathy present: none    Eyes: Diabetic retinopathy present: unknown            Most recent visit to eye doctor in last 12 months: nothing scheduled but will try    Skin: Infection or ulceration: none    Osteoporosis/ Osteopenia: none Vitamin D: none    Thyroid disease: none TSH: 1.090 (10/03/24)    Medications:     Current Outpatient Medications:     Tirzepatide (MOUNJARO) 12.5 MG/0.5ML Subcutaneous Solution Auto-injector, Inject 12.5 mg into the skin once a week., Disp: 6 mL, Rfl: 1    ATORVASTATIN 40 MG Oral Tab, Take 1 tablet (40 mg total) by mouth nightly., Disp: 90 tablet, Rfl: 0    Continuous Glucose Sensor (DEXCOM G7 SENSOR) Does not apply Misc, USE 1 EVERY 10 DAYS, Disp: 9 each, Rfl: 1    Insulin Pen Needle (BD PEN NEEDLE MICRO U/F) 32G X 6 MM Does not apply Misc, Dx- E11.9; use with Tresiba, Disp: 100 each, Rfl: 3    insulin degludec 200 UNIT/ML Subcutaneous Solution Pen-injector, Inject 30 Units into the skin at bedtime., Disp: 15 mL, Rfl: 1    Continuous Glucose Sensor (DEXCOM G7 SENSOR) Does not apply Misc, USE 1 EVERY 10 DAYS, Disp: 9 each, Rfl: 1    gabapentin 100 MG Oral Cap, Take 1 capsule (100 mg total) by mouth 3 (three) times daily., Disp: 90 capsule, Rfl: 0    cyclobenzaprine 5 MG Oral Tab, TAKE 1 TABLET BY MOUTH 3 TIMES DAILY AS NEEDED FOR MUSCLE SPASMS., Disp: 60 tablet, Rfl: 1    aspirin 81 MG Oral Tab EC, Take 1 tablet (81 mg total) by mouth As Directed., Disp: , Rfl:     losartan 50 MG Oral Tab, Take 1 tablet (50 mg total) by mouth once daily., Disp: 90 tablet, Rfl: 3    clotrimazole 1 % External Cream, Apply 1 Application topically 2 (two) times daily., Disp: 1 each, Rfl: 0    Meloxicam 15 MG Oral Tab, Take 1 tablet (15 mg total) by mouth in the morning., Disp: 30 tablet, Rfl: 3    Accu-Chek Multiclix Lancets Does not apply Misc, Check sugar AC and HS, Disp: 102 each, Rfl: 11    Glucose Blood (FREESTYLE LITE TEST) In  Vitro Strip, Check sugar AC and HS; Dx- E11.9, Disp: 200 strip, Rfl: 11    BD PEN NEEDLE MINI U/F 31G X 5 MM Does not apply Misc, , Disp: , Rfl: 0    ACCU-CHEK MULTICLIX LANCETS Does not apply Misc, 1 each by Other route., Disp: , Rfl:      Allergies:   No Known Allergies    Social History:   Social History     Socioeconomic History    Marital status:    Tobacco Use    Smoking status: Never    Smokeless tobacco: Never   Vaping Use    Vaping status: Never Used   Substance and Sexual Activity    Alcohol use: No    Drug use: No   Other Topics Concern    Caffeine Concern No     Social Drivers of Health      [facility]    Housing Stability       Medical History:   Past Medical History:    Decorative tattoo    Diabetes (HCC)    Dr Marinelli    Diabetes mellitus (HCC)    Essential hypertension    Hemorrhoids    History of varicocele    repair -  in [place]    Hypercholesterolemia    Hyperlipidemia    Hypogonadism in male    testosterone level low, no supplement recommended    Oligospermia    Screening PSA (prostate specific antigen)    Sleep apnea    Na       Surgical history:   Past Surgical History:   Procedure Laterality Date    Colonoscopy  Na    Other surgical history  Na    Na         PHYSICAL EXAM  Vitals:    03/15/25 0753   BP: 123/79   BP Location: Left arm   Pulse: 83   Resp: 18   Weight: 213 lb 3.2 oz (96.7 kg)   Height: 5' 7\" (1.702 m)      General Appearance:  alert, well developed, in no acute distress  Eyes:  normal conjunctivae, sclera., normal sclera and normal pupils  Psychiatric:  oriented to time, self, and place  Nutritional:  no abnormal weight gain or loss        Lab Data:   Lab Results   Component Value Date     (H) 01/10/2024    A1C 7.2 (A) 03/15/2025     Lab Results   Component Value Date     (H) 10/03/2024    BUN 15 10/03/2024    BUNCREA 12.9 10/03/2024    CREATSERUM 1.16 10/03/2024    ANIONGAP 6 10/03/2024     GFRNAA 101 03/16/2022    GFRAA 117 03/16/2022    CA 9.9 10/03/2024    OSMOCALC 295 10/03/2024    ALKPHO 102 10/03/2024    AST 21 10/03/2024    ALT 14 10/03/2024    ALKPHOS 61 09/05/2013    BILT 0.9 10/03/2024    TP 7.8 10/03/2024    ALB 4.3 10/03/2024    GLOBULIN 3.5 10/03/2024    AGRATIO 1.0 09/05/2013     10/03/2024    K 4.2 10/03/2024     10/03/2024    CO2 27.0 10/03/2024     Lab Results   Component Value Date    CHOLEST 193 10/03/2024    TRIG 75 10/03/2024    HDL 45 10/03/2024     (H) 10/03/2024    VLDL 14 10/03/2024    NONHDLC 148 (H) 10/03/2024     Lab Results   Component Value Date    MALBP 45.20 10/03/2024    CREUR 193.60 10/03/2024         ASSESSMENT/PLAN:  This is a 59 year-old man here for evaluation and management of uncontrolled type 2 diabetes. We discussed the ABCs of DM.     1.) Hyperglycemia Management- We discussed the importance of glycemic control to prevent complications of diabetes. We discussed the importance of SBGM. I offered and provided patient education materials and offered a blood glucose log book.   - Decrease Tresiba from 30 to 20 units at night   - Continue Mounjaro 12.5mg qweekly    - He will continue to check the blood sugars fasting and two hours after biggest meal    2.) Management of Diabetic Complications- We discussed the complications of diabetes include retinopathy, neuropathy, nephropathy and cardiovascular disease.   - Ophtho- scheduled for later this year  - Flu and Covid vaccine- up to date  - BP- at goal, on meds  - Lipids- not at goal, continue atorvastatin 40mg and recheck in 3 months  - MAC- not at goal, will check in 3 months  - CMP- will check in 3 months  - Neuropathy- none  - CAD- none    3.) Lifestyle Management for Diabetes- We discussed importance of a low CHO diet, and recommend 45gm per meal or 135gm per day. We discussed the importance of trying to follow a Mediterranean diet, with an emphasis on vegetables at every meal, with lots whole  grains, and protein from either plant-based sources, or poultry and fish.   - Diet- he is eating much healthier  - Exercise- he is very active      Return to clinic in 3 months     Prior to this encounter, I spent over 15 minutes with preparing for the visit, including reviewing documents from other specialties as well as from PCP and going over test results. During the face to face encounter, I spent an additional 15 minutes which were determined for follow-up. Greater than 50% of the time was spent in counseling, anticipatory guidance, and coordination of care. Patient concerns were answered to the best of my knowledge.         3/15/25  Zhanna Biswas MD

## 2025-03-15 NOTE — PROGRESS NOTES
-----------------------------  [employer] Clarity  -----------------------------  Nic Regulo  YOB: 1965  Generated at: Sat, Mar 15, 2025 7:46 AM CDT  Reporting period: Fri Feb 14, 2025 - Sat Mar 15, 2025  -----------------------------  Glucose Details  Average glucose: 171 mg/dL  GMI: 7.4%  Standard deviation: 56 mg/dL  Coefficient of Variation: 32.9%  -----------------------------  Time in Range  Very High: 9%  High: 28%  In Range: 62%  Low: 0%  Very Low: 1%    Target Range   mg/dL    -----------------------------  Sensor usage  Days with data: 28/30  Time active: 92%  Avg. calibrations per day: 0.0

## 2025-04-18 ENCOUNTER — TELEPHONE (OUTPATIENT)
Dept: INTERNAL MEDICINE CLINIC | Facility: CLINIC | Age: 60
End: 2025-04-18

## 2025-04-18 ENCOUNTER — PATIENT MESSAGE (OUTPATIENT)
Dept: INTERNAL MEDICINE CLINIC | Facility: CLINIC | Age: 60
End: 2025-04-18

## 2025-04-18 NOTE — TELEPHONE ENCOUNTER
Patient is calling and states he was moving furniture and he pulled a muscle in his back.  This happened yesterday.    Patient states his back is very tender an sore and he is having a hard time going up and down the stairs.    Patient is asking if he could get a pain reliever called in.    Please call and advise

## 2025-04-18 NOTE — TELEPHONE ENCOUNTER
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 Has lower back pain and rates it a number 7- he is at work right now-[email or web address]

## 2025-04-21 ENCOUNTER — TELEPHONE (OUTPATIENT)
Dept: INTERNAL MEDICINE CLINIC | Facility: CLINIC | Age: 60
End: 2025-04-21

## 2025-04-21 DIAGNOSIS — M47.816 LUMBAR SPONDYLOSIS: Primary | ICD-10-CM

## 2025-04-21 RX ORDER — TRAMADOL HYDROCHLORIDE 50 MG/1
50 TABLET ORAL EVERY 8 HOURS PRN
Qty: 42 TABLET | Refills: 1 | Status: SHIPPED | OUTPATIENT
Start: 2025-04-21

## 2025-04-21 NOTE — TELEPHONE ENCOUNTER
blu Rajput to [facility] Clinical Staff (supporting Mynor Last MD) (Selected Message)        4/18/25  4:33 PM  Good afternoon Dr Conner!  I had gone to the restroom and missed your call! Can you please call back??

## 2025-04-22 NOTE — TELEPHONE ENCOUNTER
Low back pain  Lumbar radiculopathy  Lumbar spondylosis  No longer takes gabapentin 300 mg po TID; MRI lumbar spine in May 2018 showed Mild degenerative disc and facet disease in the lower lumbar spine, most prominent at L5-S1, Moderate narrowing of the bilateral neural foramen at L5-S1, Grade I anterolisthesis of L5 on S1; s/p physical therapy; no longer taking tramadol 50 mg po TID prn, or Norco prn; takes cyclobenzaprine 10 mg po TID prn; was using meloxicam 15 mg po qD prn; s/p LESI x3 with decrease in pain; [facility]  -has also seen [facility] Kobi W[facility] at [facility]  -repeat XR L-spine 10/26/22 shows multilevel DJD at L4-L5 and L5-S1 with L5 spondylolisthesis; no fracture  -s/p meloxicam 15 mg po every day  -last went to physical therapy about one year ago; previously only went to three sessions due to cost concerns and time constrictions; advise pt pursue course of physical therapy;   -had flare of pain 4/18/25 ; now on Advil 400 mg po TID, and Tylenol prn; denies muscle spasms; avoid steiords due to DM;     Rec- tramadol 50 mg po TID prn #42, 1 RF    Pt called

## 2025-05-06 DIAGNOSIS — E11.65 TYPE 2 DIABETES MELLITUS WITH HYPERGLYCEMIA, WITH LONG-TERM CURRENT USE OF INSULIN (HCC): ICD-10-CM

## 2025-05-06 DIAGNOSIS — E78.5 DYSLIPIDEMIA: ICD-10-CM

## 2025-05-06 DIAGNOSIS — Z79.4 TYPE 2 DIABETES MELLITUS WITH HYPERGLYCEMIA, WITH LONG-TERM CURRENT USE OF INSULIN (HCC): ICD-10-CM

## 2025-05-07 RX ORDER — ATORVASTATIN CALCIUM 40 MG/1
40 TABLET, FILM COATED ORAL NIGHTLY
Qty: 90 TABLET | Refills: 0 | Status: SHIPPED | OUTPATIENT
Start: 2025-05-07

## 2025-05-07 NOTE — TELEPHONE ENCOUNTER
LOV: 3/15/2025  RTC: upcoming 6/25/2025  Last refill: 2/06/2025    Dr. Biswas-please review and sign pended prescription if agreeable.

## 2025-05-23 ENCOUNTER — TELEPHONE (OUTPATIENT)
Dept: ENDOCRINOLOGY CLINIC | Facility: CLINIC | Age: 60
End: 2025-05-23

## 2025-05-23 DIAGNOSIS — E11.65 TYPE 2 DIABETES MELLITUS WITH HYPERGLYCEMIA, WITH LONG-TERM CURRENT USE OF INSULIN (HCC): ICD-10-CM

## 2025-05-23 DIAGNOSIS — Z79.4 TYPE 2 DIABETES MELLITUS WITH HYPERGLYCEMIA, WITH LONG-TERM CURRENT USE OF INSULIN (HCC): ICD-10-CM

## 2025-05-27 RX ORDER — INSULIN DEGLUDEC 200 U/ML
20 INJECTION, SOLUTION SUBCUTANEOUS NIGHTLY
Qty: 9 ML | Refills: 1 | Status: SHIPPED | OUTPATIENT
Start: 2025-05-27

## 2025-05-27 NOTE — TELEPHONE ENCOUNTER
Endocrine refill protocol for basal insulins     Protocol Criteria: PASSED Reason: N/A    If all below requirements are met, send a 90-day supply with 1 refill per provider protocol.       Verify Appointment with Endocrinology completed in the last 6 months or scheduled in the next 3 months.  Verify A1C has been completed within the last 6 months and is below 8.5%     Last completed office visit:3/15/2025 Zhanna Biswas MD   Last completed telemed visit: Visit date not found  Next scheduled Follow up:   Future Appointments   Date Time Provider Department Center   6/25/2025  4:00 PM Zhanna Biswas MD [facility]      Last A1c result: Last A1c value was 7.2% done 3/15/2025.

## 2025-05-29 NOTE — TELEPHONE ENCOUNTER
Medication Quantity Refills Start End   insulin degludec (TRESIBA FLEXTOUCH) 200 UNIT/ML Subcutaneous Solution Pen-injector 9 mL 1 5/27/2025 --   Sig:   Inject 20 Units into the skin nightly.     Route:   Subcutaneous         KEY: [id number]

## 2025-05-29 NOTE — TELEPHONE ENCOUNTER
Called pt pharmacy on file spoke with rep who stated insulin prescription is all good & is ready for pt nothing else is needed on our end.

## 2025-06-24 ENCOUNTER — LAB ENCOUNTER (OUTPATIENT)
Dept: LAB | Facility: HOSPITAL | Age: 60
End: 2025-06-24
Attending: INTERNAL MEDICINE
Payer: COMMERCIAL

## 2025-06-24 DIAGNOSIS — E66.811 CLASS 1 OBESITY DUE TO EXCESS CALORIES WITH SERIOUS COMORBIDITY AND BODY MASS INDEX (BMI) OF 33.0 TO 33.9 IN ADULT: ICD-10-CM

## 2025-06-24 DIAGNOSIS — N52.9 ERECTILE DYSFUNCTION, UNSPECIFIED ERECTILE DYSFUNCTION TYPE: ICD-10-CM

## 2025-06-24 DIAGNOSIS — E66.09 CLASS 1 OBESITY DUE TO EXCESS CALORIES WITH SERIOUS COMORBIDITY AND BODY MASS INDEX (BMI) OF 33.0 TO 33.9 IN ADULT: ICD-10-CM

## 2025-06-24 DIAGNOSIS — R53.83 FATIGUE, UNSPECIFIED TYPE: ICD-10-CM

## 2025-06-24 DIAGNOSIS — E78.5 DYSLIPIDEMIA: ICD-10-CM

## 2025-06-24 DIAGNOSIS — Z79.4 TYPE 2 DIABETES MELLITUS WITH HYPERGLYCEMIA, WITH LONG-TERM CURRENT USE OF INSULIN (HCC): ICD-10-CM

## 2025-06-24 DIAGNOSIS — E11.65 TYPE 2 DIABETES MELLITUS WITH HYPERGLYCEMIA, WITH LONG-TERM CURRENT USE OF INSULIN (HCC): ICD-10-CM

## 2025-06-24 LAB
ALBUMIN SERPL-MCNC: 4.2 G/DL (ref 3.2–4.8)
ALBUMIN/GLOB SERPL: 1.3 {RATIO} (ref 1–2)
ALP LIVER SERPL-CCNC: 109 U/L (ref 45–117)
ALT SERPL-CCNC: 23 U/L (ref 10–49)
ANION GAP SERPL CALC-SCNC: 5 MMOL/L (ref 0–18)
AST SERPL-CCNC: 21 U/L (ref ?–34)
BILIRUB SERPL-MCNC: 1.3 MG/DL (ref 0.3–1.2)
BUN BLD-MCNC: 10 MG/DL (ref 9–23)
BUN/CREAT SERPL: 10.5 (ref 10–20)
CALCIUM BLD-MCNC: 8.9 MG/DL (ref 8.7–10.4)
CHLORIDE SERPL-SCNC: 104 MMOL/L (ref 98–112)
CHOLEST SERPL-MCNC: 189 MG/DL (ref ?–200)
CO2 SERPL-SCNC: 28 MMOL/L (ref 21–32)
CORTIS SERPL-MCNC: 12.4 UG/DL
CREAT BLD-MCNC: 0.95 MG/DL (ref 0.7–1.3)
CREAT UR-SCNC: 188.5 MG/DL
EGFRCR SERPLBLD CKD-EPI 2021: 93 ML/MIN/1.73M2 (ref 60–?)
FASTING PATIENT LIPID ANSWER: YES
FASTING STATUS PATIENT QL REPORTED: YES
FSH SERPL-ACNC: 25.6 MIU/ML (ref 1.4–18.1)
GLOBULIN PLAS-MCNC: 3.3 G/DL (ref 2–3.5)
GLUCOSE BLD-MCNC: 183 MG/DL (ref 70–99)
HDLC SERPL-MCNC: 58 MG/DL (ref 40–59)
LDLC SERPL CALC-MCNC: 117 MG/DL (ref ?–100)
LH SERPL-ACNC: 9.7 MIU/ML (ref 1.5–9.3)
MICROALBUMIN UR-MCNC: 98.9 MG/DL
MICROALBUMIN/CREAT 24H UR-RTO: 524.7 UG/MG (ref ?–30)
NONHDLC SERPL-MCNC: 131 MG/DL (ref ?–130)
OSMOLALITY SERPL CALC.SUM OF ELEC: 288 MOSM/KG (ref 275–295)
POTASSIUM SERPL-SCNC: 4.3 MMOL/L (ref 3.5–5.1)
PROLACTIN SERPL-MCNC: 6.7 NG/ML (ref 2.1–17.7)
PROT SERPL-MCNC: 7.5 G/DL (ref 5.7–8.2)
SODIUM SERPL-SCNC: 137 MMOL/L (ref 136–145)
T4 FREE SERPL-MCNC: 1.2 NG/DL (ref 0.8–1.7)
TRIGL SERPL-MCNC: 77 MG/DL (ref 30–149)
TSI SER-ACNC: 1.24 UIU/ML (ref 0.55–4.78)
VIT B12 SERPL-MCNC: 358 PG/ML (ref 211–911)
VIT D+METAB SERPL-MCNC: 23.4 NG/ML (ref 30–100)
VLDLC SERPL CALC-MCNC: 13 MG/DL (ref 0–30)

## 2025-06-24 PROCEDURE — 84443 ASSAY THYROID STIM HORMONE: CPT

## 2025-06-24 PROCEDURE — 82306 VITAMIN D 25 HYDROXY: CPT

## 2025-06-24 PROCEDURE — 82043 UR ALBUMIN QUANTITATIVE: CPT

## 2025-06-24 PROCEDURE — 84439 ASSAY OF FREE THYROXINE: CPT

## 2025-06-24 PROCEDURE — 84305 ASSAY OF SOMATOMEDIN: CPT

## 2025-06-24 PROCEDURE — 83001 ASSAY OF GONADOTROPIN (FSH): CPT

## 2025-06-24 PROCEDURE — 82607 VITAMIN B-12: CPT

## 2025-06-24 PROCEDURE — 80061 LIPID PANEL: CPT

## 2025-06-24 PROCEDURE — 82570 ASSAY OF URINE CREATININE: CPT

## 2025-06-24 PROCEDURE — 36415 COLL VENOUS BLD VENIPUNCTURE: CPT

## 2025-06-24 PROCEDURE — 84410 TESTOSTERONE BIOAVAILABLE: CPT

## 2025-06-24 PROCEDURE — 82024 ASSAY OF ACTH: CPT

## 2025-06-24 PROCEDURE — 80053 COMPREHEN METABOLIC PANEL: CPT

## 2025-06-24 PROCEDURE — 84146 ASSAY OF PROLACTIN: CPT

## 2025-06-24 PROCEDURE — 82533 TOTAL CORTISOL: CPT

## 2025-06-24 PROCEDURE — 83002 ASSAY OF GONADOTROPIN (LH): CPT

## 2025-06-25 ENCOUNTER — TELEPHONE (OUTPATIENT)
Dept: ENDOCRINOLOGY CLINIC | Facility: CLINIC | Age: 60
End: 2025-06-25

## 2025-06-25 ENCOUNTER — OFFICE VISIT (OUTPATIENT)
Dept: ENDOCRINOLOGY CLINIC | Facility: CLINIC | Age: 60
End: 2025-06-25
Payer: COMMERCIAL

## 2025-06-25 VITALS
HEIGHT: 67 IN | DIASTOLIC BLOOD PRESSURE: 77 MMHG | BODY MASS INDEX: 33.46 KG/M2 | SYSTOLIC BLOOD PRESSURE: 90 MMHG | HEART RATE: 100 BPM | WEIGHT: 213.19 LBS

## 2025-06-25 DIAGNOSIS — I10 ESSENTIAL HYPERTENSION: Primary | ICD-10-CM

## 2025-06-25 DIAGNOSIS — E78.5 DYSLIPIDEMIA: ICD-10-CM

## 2025-06-25 DIAGNOSIS — E11.65 TYPE 2 DIABETES MELLITUS WITH HYPERGLYCEMIA, WITH LONG-TERM CURRENT USE OF INSULIN (HCC): ICD-10-CM

## 2025-06-25 DIAGNOSIS — Z79.4 TYPE 2 DIABETES MELLITUS WITH HYPERGLYCEMIA, WITH LONG-TERM CURRENT USE OF INSULIN (HCC): ICD-10-CM

## 2025-06-25 LAB
ACTH: 75 PG/ML
CARTRIDGE EXPIRATION DATE: ABNORMAL DATE
GLUCOSE BLOOD: 123
HEMOGLOBIN A1C: 7.5 % (ref 4.3–5.6)
TEST STRIP LOT #: NORMAL NUMERIC

## 2025-06-25 PROCEDURE — 3060F POS MICROALBUMINURIA REV: CPT | Performed by: INTERNAL MEDICINE

## 2025-06-25 PROCEDURE — 3051F HG A1C>EQUAL 7.0%<8.0%: CPT | Performed by: INTERNAL MEDICINE

## 2025-06-25 PROCEDURE — 82947 ASSAY GLUCOSE BLOOD QUANT: CPT | Performed by: INTERNAL MEDICINE

## 2025-06-25 PROCEDURE — 3078F DIAST BP <80 MM HG: CPT | Performed by: INTERNAL MEDICINE

## 2025-06-25 PROCEDURE — 3074F SYST BP LT 130 MM HG: CPT | Performed by: INTERNAL MEDICINE

## 2025-06-25 PROCEDURE — 83036 HEMOGLOBIN GLYCOSYLATED A1C: CPT | Performed by: INTERNAL MEDICINE

## 2025-06-25 PROCEDURE — 99214 OFFICE O/P EST MOD 30 MIN: CPT | Performed by: INTERNAL MEDICINE

## 2025-06-25 PROCEDURE — 3008F BODY MASS INDEX DOCD: CPT | Performed by: INTERNAL MEDICINE

## 2025-06-25 RX ORDER — TIRZEPATIDE 12.5 MG/.5ML
12.5 INJECTION, SOLUTION SUBCUTANEOUS WEEKLY
Qty: 6 ML | Refills: 3 | Status: SHIPPED | OUTPATIENT
Start: 2025-06-25 | End: 2025-09-23

## 2025-06-25 RX ORDER — INSULIN DEGLUDEC 200 U/ML
20 INJECTION, SOLUTION SUBCUTANEOUS NIGHTLY
Qty: 9 ML | Refills: 1 | Status: CANCELLED | OUTPATIENT
Start: 2025-06-25

## 2025-06-25 RX ORDER — ACYCLOVIR 400 MG/1
1 TABLET ORAL
Qty: 9 EACH | Refills: 1 | Status: SHIPPED | OUTPATIENT
Start: 2025-06-25

## 2025-06-25 NOTE — PROGRESS NOTES
-----------------------------  [employer] Clarity  -----------------------------  Nic Regulo    YOB: 1965    Generated at: Wed, Jun 25, 2025 4:09 PM CDT    Reporting period: Tue May 27, 2025 - Wed Jun 25, 2025  -----------------------------  Glucose Details    Average glucose: 186 mg/dL    GMI: 7.8%    Standard deviation: 65 mg/dL    Coefficient of Variation: 35.1%  -----------------------------  Time in Range    Very High: 17%    High: 30%    In Range: 53%    Low: 0%    Very Low: <1%    Target Range   mg/dL    -----------------------------  Sensor usage    Days with data: 28/30    Time active: 88%    Avg. calibrations per day: 0.0

## 2025-06-25 NOTE — PROGRESS NOTES
Name: Nic Rajput  Date: 3/15/25    Referring Physician: No ref. provider found    HISTORY OF PRESENT ILLNESS   Nic Rajput is a 59 year old male who presents for evaluation and management of type 2 diabetes. He was diagnosed with diabetes about 17 years ago. A few visits ago, I had started him on Mounjaro and stopped his Novolog.     12/24/24: At the last visit, I had increased his Mounjaro dose. He is doing really well. I had also decreased the Tresiba from 55 to 30 units.    3/15/25: At the last visit, I had again increased the Mounjaro dose to 12.5mg and continued the 30 units.     6/25/25: At the last visit, I had continued the Mounjaro 12.5mg and decreased the Tresiba from 30 to 20 units. He is doing well on this.    Blood Glucose Today: 123  HbA1C or glycohemoglobin is 7.5 today (and it was 7.2 on 3/15/25 and it was 7.5 on 12/24/24 and it was 7.9 on 9/14/24 and it was 8.3 on 1/10/24 and it was 10.8 on 10/18/23)  Type 1 or Type 2?: Type 2   Medications for DM: Tresiba 20 units at bedtime; Mounjaro 12.5mg qweekly  Checking many times per day  -----------------------------  Dexcom Clarity  -----------------------------  Nic Rajput    YOB: 1965    Generated at: Wed, Jun 25, 2025 4:09 PM CDT    Reporting period: Tue May 27, 2025 - Wed Jun 25, 2025  -----------------------------  Glucose Details    Average glucose: 186 mg/dL    GMI: 7.8%    Standard deviation: 65 mg/dL    Coefficient of Variation: 35.1%  -----------------------------  Time in Range    Very High: 17%    High: 30%    In Range: 53%    Low: 0%    Very Low: <1%     Target Range   mg/dL     -----------------------------  Sensor usage    Days with data: 28/30    Time active: 88%    Avg. calibrations per day: 0.0     Dietary compliance: he is eating very healthy    Exercise: he is very active physically    Polyuria/polydipsia: none    Blurred vision: none    Flu Vaccine This Season: yes    Covid Vaccine: yes    REVIEW OF  SYSTEMS  CV: Cardiovascular disease present: none         Hypertension present: at goal, on meds         Hyperlipidemia present: not at goal, on meds, but better (6/24/25)         Peripheral Vascular Disease present: none    : Nephropathy present: MAC: 524.7 (6/24/25) Creatinine: 0.95     Neuro: Neuropathy present: none    Eyes: Diabetic retinopathy present: unknown            Most recent visit to eye doctor in last 12 months: nothing scheduled but will try    Skin: Infection or ulceration: none    Osteoporosis/ Osteopenia: none Vitamin D: none    Thyroid disease: none TSH: 1.240 (6/24/25)    Medications:     Current Outpatient Medications:     Continuous Glucose Sensor (DEXCOM G7 SENSOR) Does not apply Misc, 1 each Every 10 days., Disp: 9 each, Rfl: 1    Tirzepatide (MOUNJARO) 12.5 MG/0.5ML Subcutaneous Solution Auto-injector, Inject 12.5 mg into the skin once a week., Disp: 6 mL, Rfl: 3    insulin degludec (TRESIBA FLEXTOUCH) 200 UNIT/ML Subcutaneous Solution Pen-injector, Inject 20 Units into the skin nightly., Disp: 9 mL, Rfl: 1    ATORVASTATIN 40 MG Oral Tab, Take 1 tablet (40 mg total) by mouth nightly., Disp: 90 tablet, Rfl: 0    traMADol 50 MG Oral Tab, Take 1 tablet (50 mg total) by mouth every 8 (eight) hours as needed for Pain., Disp: 42 tablet, Rfl: 1    Continuous Glucose Sensor (DEXCOM G7 SENSOR) Does not apply Misc, USE 1 EVERY 10 DAYS, Disp: 9 each, Rfl: 1    Insulin Pen Needle (BD PEN NEEDLE MICRO U/F) 32G X 6 MM Does not apply Misc, Dx- E11.9; use with Tresiba, Disp: 100 each, Rfl: 3    gabapentin 100 MG Oral Cap, Take 1 capsule (100 mg total) by mouth 3 (three) times daily., Disp: 90 capsule, Rfl: 0    cyclobenzaprine 5 MG Oral Tab, TAKE 1 TABLET BY MOUTH 3 TIMES DAILY AS NEEDED FOR MUSCLE SPASMS., Disp: 60 tablet, Rfl: 1    aspirin 81 MG Oral Tab EC, Take 1 tablet (81 mg total) by mouth As Directed., Disp: , Rfl:     losartan 50 MG Oral Tab, Take 1 tablet (50 mg total) by mouth once daily.,  Disp: 90 tablet, Rfl: 3    clotrimazole 1 % External Cream, Apply 1 Application topically 2 (two) times daily., Disp: 1 each, Rfl: 0    Meloxicam 15 MG Oral Tab, Take 1 tablet (15 mg total) by mouth in the morning., Disp: 30 tablet, Rfl: 3    Accu-Chek Multiclix Lancets Does not apply Misc, Check sugar AC and HS, Disp: 102 each, Rfl: 11    Glucose Blood (FREESTYLE LITE TEST) In Vitro Strip, Check sugar AC and HS; Dx- E11.9, Disp: 200 strip, Rfl: 11    BD PEN NEEDLE MINI U/F 31G X 5 MM Does not apply Misc, , Disp: , Rfl: 0    ACCU-CHEK MULTICLIX LANCETS Does not apply Misc, 1 each by Other route., Disp: , Rfl:      Allergies:   No Known Allergies    Social History:   Social History     Socioeconomic History    Marital status:    Tobacco Use    Smoking status: Never    Smokeless tobacco: Never   Vaping Use    Vaping status: Never Used   Substance and Sexual Activity    Alcohol use: No    Drug use: No   Other Topics Concern    Caffeine Concern No     Social Drivers of Health      Received from [facility]    Housing Stability       Medical History:   Past Medical History:    Decorative tattoo    Diabetes (HCC)    Dr Marinelli    Diabetes mellitus (HCC)    Essential hypertension    Hemorrhoids    History of varicocele    repair -  in [place]    Hypercholesterolemia    Hyperlipidemia    Hypogonadism in male    testosterone level low, no supplement recommended    Oligospermia    Screening PSA (prostate specific antigen)    Sleep apnea    Na       Surgical history:   Past Surgical History:   Procedure Laterality Date    Colonoscopy  Na    Other surgical history  Na    Na         PHYSICAL EXAM  Vitals:    06/25/25 1557   BP: 90/77   Pulse: 100   Weight: 213 lb 3.2 oz (96.7 kg)   Height: 5' 7\" (1.702 m)        General Appearance:  alert, well developed, in no acute distress  Eyes:  normal conjunctivae, sclera., normal sclera and normal pupils  Psychiatric:  oriented to time, self, and  place  Nutritional:  no abnormal weight gain or loss        Lab Data:   Lab Results   Component Value Date     (H) 01/10/2024    A1C 7.5 (A) 06/25/2025     Lab Results   Component Value Date     (H) 06/24/2025    BUN 10 06/24/2025    BUNCREA 10.5 06/24/2025    CREATSERUM 0.95 06/24/2025    ANIONGAP 5 06/24/2025    GFRNAA 101 03/16/2022    GFRAA 117 03/16/2022    CA 8.9 06/24/2025    OSMOCALC 288 06/24/2025    ALKPHO 109 06/24/2025    AST 21 06/24/2025    ALT 23 06/24/2025    ALKPHOS 61 09/05/2013    BILT 1.3 (H) 06/24/2025    TP 7.5 06/24/2025    ALB 4.2 06/24/2025    GLOBULIN 3.3 06/24/2025    AGRATIO 1.0 09/05/2013     06/24/2025    K 4.3 06/24/2025     06/24/2025    CO2 28.0 06/24/2025     Lab Results   Component Value Date    CHOLEST 189 06/24/2025    TRIG 77 06/24/2025    HDL 58 06/24/2025     (H) 06/24/2025    VLDL 13 06/24/2025    NONHDLC 131 (H) 06/24/2025     Lab Results   Component Value Date    MALBP 98.90 06/24/2025    CREUR 188.50 06/24/2025         ASSESSMENT/PLAN:  This is a 59 year-old man here for evaluation and management of uncontrolled type 2 diabetes. We discussed the ABCs of DM.     1.) Hyperglycemia Management- We discussed the importance of glycemic control to prevent complications of diabetes. We discussed the importance of SBGM. I offered and provided patient education materials and offered a blood glucose log book.   - Continue Tresiba 20 units at night   - Continue Mounjaro 12.5mg qweekly    - He will continue to check the blood sugars fasting and two hours after biggest meal    2.) Management of Diabetic Complications- We discussed the complications of diabetes include retinopathy, neuropathy, nephropathy and cardiovascular disease.   - Ophtho- scheduled for later this year  - Flu and Covid vaccine- up to date  - BP- at goal, on meds  - Lipids- not at goal, continue atorvastatin 40mg and recheck in 3 months  - MAC- not at goal, will check in 3 months  -  CMP- will check in 3 months; creatinine is better  - Neuropathy- none  - CAD- none    3.) Lifestyle Management for Diabetes- We discussed importance of a low CHO diet, and recommend 45gm per meal or 135gm per day. We discussed the importance of trying to follow a Mediterranean diet, with an emphasis on vegetables at every meal, with lots whole grains, and protein from either plant-based sources, or poultry and fish.   - Diet- he is eating much healthier  - Exercise- he is very active    4.) Follow up on labs, pituitary function      Return to clinic in 3 months     Prior to this encounter, I spent over 15 minutes with preparing for the visit, including reviewing documents from other specialties as well as from PCP and going over test results. During the face to face encounter, I spent an additional 15 minutes which were determined for follow-up. Greater than 50% of the time was spent in counseling, anticipatory guidance, and coordination of care. Patient concerns were answered to the best of my knowledge.         6/25/25  Zhanna Biswas MD

## 2025-06-25 NOTE — TELEPHONE ENCOUNTER
Spoke to pharmacist: patient picked up one box (9mL) of tresiba U200 with co-pay of $53.97 - part of deductible  [facility] sent updating patient

## 2025-06-26 LAB
IGF I: 161 NG/ML
IGF-1, Z SCORE: 0.4 S.D.

## 2025-06-27 LAB
SEX HORM BIND GLOB: 39.1 NMOL/L
TESTOST % FREE+WEAK BND: 18.5 %
TESTOST FREE+WEAK BND: 73.9 NG/DL
TESTOSTERONE TOT /MS: 399.3 NG/DL

## 2025-08-16 DIAGNOSIS — E11.65 TYPE 2 DIABETES MELLITUS WITH HYPERGLYCEMIA, WITH LONG-TERM CURRENT USE OF INSULIN (HCC): ICD-10-CM

## 2025-08-16 DIAGNOSIS — E78.5 DYSLIPIDEMIA: ICD-10-CM

## 2025-08-16 DIAGNOSIS — Z79.4 TYPE 2 DIABETES MELLITUS WITH HYPERGLYCEMIA, WITH LONG-TERM CURRENT USE OF INSULIN (HCC): ICD-10-CM

## 2025-08-16 RX ORDER — ATORVASTATIN CALCIUM 40 MG/1
40 TABLET, FILM COATED ORAL NIGHTLY
Qty: 90 TABLET | Refills: 0 | Status: SHIPPED | OUTPATIENT
Start: 2025-08-16

## (undated) DIAGNOSIS — Z00.00 ANNUAL PHYSICAL EXAM: Primary | ICD-10-CM

## (undated) DIAGNOSIS — Z12.5 SCREENING PSA (PROSTATE SPECIFIC ANTIGEN): ICD-10-CM

## (undated) DIAGNOSIS — E11.9 TYPE 2 DIABETES MELLITUS WITHOUT COMPLICATION, UNSPECIFIED WHETHER LONG TERM INSULIN USE (HCC): ICD-10-CM

## (undated) DIAGNOSIS — R20.0 ANESTHESIA OF SKIN: Primary | ICD-10-CM

## (undated) DIAGNOSIS — Z12.5 SCREENING FOR PROSTATE CANCER: ICD-10-CM

## (undated) DIAGNOSIS — I10 BENIGN HYPERTENSION: ICD-10-CM

## (undated) NOTE — LETTER
3/1/2023          To Whom It May Concern:    Brianna Brewster is currently under my medical care from 2/28/23-3/1/23  And may return to work 3/2/23      If you require additional information please contact our office.         Sincerely,    Sharon Gamboa D.O

## (undated) NOTE — LETTER
11/16/2017          To Whom It May Concern:    Hima Piña is currently under my medical care and may return to work with no restrictions. If you require additional information please contact our office.         Sincerely,    Bailey Benjamin MD

## (undated) NOTE — MR AVS SNAPSHOT
[place]  [address] [place] [address]  [phone]               Thank you for choosing us for your health care visit with Mercedes Lopez MD.  We are glad to serve you and happy to provide you with this summary of your visit.   [address] returned a non-compliant response. The tax information is out of balance. What changed:  Another medication with the same name was added. Make sure you understand how and when to take each.    Commonly known as:  [facility]:  XR RIBS, UNILATERAL (2 VIEWS), RIGHT (CPT=71100)    Instructions:   To schedule a test at any [facility], call Central Scheduling at [phone], Monday through Friday between 7:30am to 6pm and on Saturday between 8 visit,  view other health information, and more. To sign up or find more information, go to [email or web address] and click on the Sign Up Now link in the [employer] box.      Enter your SCHEDit Activation Code exactly as it appears below along with yo

## (undated) NOTE — LETTER
To Whom It May Concern:    Valentino Karvonen is currently under my medical care and may not return to work at this time. Please excuse Yolie Hanley for 3 days for now. He may return to work if covid negative.   If positive will need to be off 14 days    Activity is

## (undated) NOTE — LETTER
Date & Time: 11/9/2022, 1:37 PM  Patient: Allison Shay  Encounter Provider(s):    BEN Hooks       To Whom It May Concern:    Elliott Davies was seen and treated in our department on 11/9/2022. He should be excused from work to return 11/14/22.     If you have any questions or concerns, please do not hesitate to call.        _____________________________  Physician/APC Signature

## (undated) NOTE — LETTER
Patient: Nic Rajput   YOB: 1965   Date of Visit: 8/22/2024     Dear Employer,        August 22, 2024    At [facility], we are taking special precautions and doing everything we can to prevent the spread of COVID-19. During this time, we ask for your assistance regarding physician documentation for employees to return to work following a respiratory illness.     The Centers for Disease Control (CDC) recommends the following:    Ensure that sick leave policies are flexible and consistent with public health guidance, and employees are aware of and understand these policies.   Maintain flexible policies that permit employees to stay home to care for a sick family member or to take care of children due to school and  closures.   Employers should not require a COVID-19 test result or a healthcare provider’s note for sick employees to validate their illness, qualify for sick leave or return to work.   Be aware that healthcare provider offices and medical facilities may be extremely busy and not able to provide documentation in a timely manner. Most people with COVID-19 have mild illness, can recover at home without medical care and can follow CDC recommendations to determine when to discontinue home isolation and return to work.      Individuals with COVID-19 symptoms directed to care for themselves at home should:  Isolate for five days. If individuals are asymptomatic or symptoms are resolving (without fever for 24 hours), isolation may be discontinued on day six. Onset of symptoms is considered day zero.   Follow isolation by five days of mask wearing when around others to minimize the risk of infection.     Individuals infected with SARS-CoV-2 who never develop COVID-19 symptoms:    Day of positive test is considered day zero.   Isolate for five days.  Can discontinue isolation on day six.   Follow isolation by five days of wearing a mask when around others to minimize the risk of  infection.    Individuals who are asymptomatic but have been exposed:  For people who are unvaccinated or are more than six months out from their second mRNA dose (or more than two months after the J&J vaccine) and not yet boosted, CDC now recommends quarantine for five days followed by strict, mask use for an additional five days. Alternatively, if a five-day quarantine is not feasible, it is imperative that an exposed person wear a well-fitting mask at all times when around others for 10 days after exposure.   Individuals who have received their booster shot do not need to quarantine following an exposure but should wear a mask for 10 days after the exposure.    Best practice would also include a test on day five after exposure.   If symptoms occur, individuals should immediately quarantine until a negative test confirms symptoms are not attributable to COVID-19.     Please visit the CDC website for further information and details to assist you during this challenging time.     Sincerely,     No att. providers found

## (undated) NOTE — LETTER
7/21/2020              [address]        [place] [address]         To Whom It May Concern,      Sincerely,    Iris Anderson DO  [facility] [address]

## (undated) NOTE — LETTER
To Whom It May Concern:  This certifies that Nic Rajput was seen in my office today for acute medical illness. Please excuse him from work from October 1 through October 6. It is anticipated he will return to work on Monday,  October 7, 2024.  Do not hesitate to call with any questions or concerns.        Sincerely,      Mynor Last MD  [address] [address]  [phone]        Document generated by:  Mynor Last MD

## (undated) NOTE — ED AVS SNAPSHOT
Yessenia Espino   MRN: [id number]    Department:  [facility] Emergency Department   Date of Visit:  1/25/2020           Disclosure     Insurance plans vary and the physician(s) referred by the ER may not be covered by your plan.  Please contact y CARE PHYSICIAN AT ONCE OR RETURN IMMEDIATELY TO THE EMERGENCY DEPARTMENT. If you have been prescribed any medication(s), please fill your prescription right away and begin taking the medication(s) as directed.   If you believe that any of the medications

## (undated) NOTE — LETTER
3/11/2019    [address]            Dear Marge Ferreira,      Our records indicate that you are due for an appointment for a Colonoscopy in May 2019, or shortly there after, with YADIRA Johnston

## (undated) NOTE — LETTER
Date & Time: 5/3/2023, 4:33 AM  Patient: Sergo Cohen  Encounter Provider(s):    Akil Marin MD       To Whom It May Concern:    Waleska Melvin was seen and treated in our department on 5/2/2023. He should not return to work until 05/08/2023 .     If you have any questions or concerns, please do not hesitate to call.        _____________________________  RN Signature

## (undated) NOTE — Clinical Note
1/24/2017              [address]        [place] [address]         Dear Bailee Thomas,    This letter is to inform you that our office has made several attempts to reach you by phone without success.   We were attempting to contac

## (undated) NOTE — LETTER
2017              Cassandra Forte        [address] [facility] [address]        : [birth year]         To Whom it May Concern:     Cassandra Forte is a patient who receives his medical care from this office.   He was seen in the office on Mon

## (undated) NOTE — LETTER
IMMEDIATE CARE [facility] [address]  [address]  [phone]     Patient: Chuy Dominguez   YOB: 1965   Date of Visit: 9/22/2021     Dear Employer,        September 22, 2021    Jeet Fitch tested negative for Covid today, but is expe since resolution of fever without the use of fever-reducing medications and  • Other symptoms have improved.   Persons infected with SARS-CoV-2 who never develop COVID-19 symptoms may discontinue isolation and other precautions 10 days after the date of the